# Patient Record
Sex: MALE | Race: OTHER | HISPANIC OR LATINO | URBAN - METROPOLITAN AREA
[De-identification: names, ages, dates, MRNs, and addresses within clinical notes are randomized per-mention and may not be internally consistent; named-entity substitution may affect disease eponyms.]

---

## 2018-03-30 ENCOUNTER — OUTPATIENT (OUTPATIENT)
Dept: OUTPATIENT SERVICES | Facility: HOSPITAL | Age: 83
LOS: 1 days | Discharge: HOME | End: 2018-03-30

## 2018-03-30 DIAGNOSIS — E03.9 HYPOTHYROIDISM, UNSPECIFIED: ICD-10-CM

## 2018-03-30 DIAGNOSIS — D64.9 ANEMIA, UNSPECIFIED: ICD-10-CM

## 2018-03-30 DIAGNOSIS — E13.9 OTHER SPECIFIED DIABETES MELLITUS WITHOUT COMPLICATIONS: ICD-10-CM

## 2018-03-30 DIAGNOSIS — N40.0 BENIGN PROSTATIC HYPERPLASIA WITHOUT LOWER URINARY TRACT SYMPTOMS: ICD-10-CM

## 2018-03-30 DIAGNOSIS — E78.1 PURE HYPERGLYCERIDEMIA: ICD-10-CM

## 2018-03-30 DIAGNOSIS — D51.8 OTHER VITAMIN B12 DEFICIENCY ANEMIAS: ICD-10-CM

## 2018-03-30 DIAGNOSIS — Z00.00 ENCOUNTER FOR GENERAL ADULT MEDICAL EXAMINATION WITHOUT ABNORMAL FINDINGS: ICD-10-CM

## 2018-03-30 DIAGNOSIS — R79.9 ABNORMAL FINDING OF BLOOD CHEMISTRY, UNSPECIFIED: ICD-10-CM

## 2018-03-30 DIAGNOSIS — E53.8 DEFICIENCY OF OTHER SPECIFIED B GROUP VITAMINS: ICD-10-CM

## 2018-03-30 DIAGNOSIS — N39.0 URINARY TRACT INFECTION, SITE NOT SPECIFIED: ICD-10-CM

## 2018-03-30 DIAGNOSIS — R53.83 OTHER FATIGUE: ICD-10-CM

## 2018-03-30 DIAGNOSIS — D52.9 FOLATE DEFICIENCY ANEMIA, UNSPECIFIED: ICD-10-CM

## 2018-03-30 DIAGNOSIS — E55.9 VITAMIN D DEFICIENCY, UNSPECIFIED: ICD-10-CM

## 2018-06-29 ENCOUNTER — OUTPATIENT (OUTPATIENT)
Dept: OUTPATIENT SERVICES | Facility: HOSPITAL | Age: 83
LOS: 1 days | Discharge: HOME | End: 2018-06-29

## 2018-06-29 DIAGNOSIS — Z00.00 ENCOUNTER FOR GENERAL ADULT MEDICAL EXAMINATION WITHOUT ABNORMAL FINDINGS: ICD-10-CM

## 2019-03-20 ENCOUNTER — OUTPATIENT (OUTPATIENT)
Dept: OUTPATIENT SERVICES | Facility: HOSPITAL | Age: 84
LOS: 1 days | Discharge: HOME | End: 2019-03-20

## 2019-03-20 DIAGNOSIS — Z00.00 ENCOUNTER FOR GENERAL ADULT MEDICAL EXAMINATION WITHOUT ABNORMAL FINDINGS: ICD-10-CM

## 2019-03-20 DIAGNOSIS — D51.9 VITAMIN B12 DEFICIENCY ANEMIA, UNSPECIFIED: ICD-10-CM

## 2019-03-20 DIAGNOSIS — D51.8 OTHER VITAMIN B12 DEFICIENCY ANEMIAS: ICD-10-CM

## 2019-03-20 DIAGNOSIS — E78.2 MIXED HYPERLIPIDEMIA: ICD-10-CM

## 2019-03-20 DIAGNOSIS — N40.0 BENIGN PROSTATIC HYPERPLASIA WITHOUT LOWER URINARY TRACT SYMPTOMS: ICD-10-CM

## 2019-03-20 DIAGNOSIS — E13.9 OTHER SPECIFIED DIABETES MELLITUS WITHOUT COMPLICATIONS: ICD-10-CM

## 2019-03-20 DIAGNOSIS — D52.9 FOLATE DEFICIENCY ANEMIA, UNSPECIFIED: ICD-10-CM

## 2019-03-20 DIAGNOSIS — E03.9 HYPOTHYROIDISM, UNSPECIFIED: ICD-10-CM

## 2019-03-20 DIAGNOSIS — E53.8 DEFICIENCY OF OTHER SPECIFIED B GROUP VITAMINS: ICD-10-CM

## 2019-03-20 DIAGNOSIS — E55.9 VITAMIN D DEFICIENCY, UNSPECIFIED: ICD-10-CM

## 2019-03-20 DIAGNOSIS — N39.0 URINARY TRACT INFECTION, SITE NOT SPECIFIED: ICD-10-CM

## 2019-03-20 DIAGNOSIS — D64.9 ANEMIA, UNSPECIFIED: ICD-10-CM

## 2019-03-20 DIAGNOSIS — R79.9 ABNORMAL FINDING OF BLOOD CHEMISTRY, UNSPECIFIED: ICD-10-CM

## 2019-03-20 DIAGNOSIS — R53.83 OTHER FATIGUE: ICD-10-CM

## 2019-06-18 ENCOUNTER — OUTPATIENT (OUTPATIENT)
Dept: OUTPATIENT SERVICES | Facility: HOSPITAL | Age: 84
LOS: 1 days | Discharge: HOME | End: 2019-06-18
Payer: MEDICARE

## 2019-06-18 PROCEDURE — 93923 UPR/LXTR ART STDY 3+ LVLS: CPT | Mod: 26

## 2019-06-24 DIAGNOSIS — I70.213 ATHEROSCLEROSIS OF NATIVE ARTERIES OF EXTREMITIES WITH INTERMITTENT CLAUDICATION, BILATERAL LEGS: ICD-10-CM

## 2019-06-27 PROBLEM — Z00.00 ENCOUNTER FOR PREVENTIVE HEALTH EXAMINATION: Status: ACTIVE | Noted: 2019-06-27

## 2019-07-19 ENCOUNTER — APPOINTMENT (OUTPATIENT)
Dept: VASCULAR SURGERY | Facility: CLINIC | Age: 84
End: 2019-07-19
Payer: MEDICARE

## 2019-07-19 ENCOUNTER — OUTPATIENT (OUTPATIENT)
Dept: OUTPATIENT SERVICES | Facility: HOSPITAL | Age: 84
LOS: 1 days | Discharge: HOME | End: 2019-07-19

## 2019-07-19 VITALS
HEIGHT: 66 IN | SYSTOLIC BLOOD PRESSURE: 148 MMHG | DIASTOLIC BLOOD PRESSURE: 80 MMHG | BODY MASS INDEX: 24.91 KG/M2 | WEIGHT: 155 LBS

## 2019-07-19 DIAGNOSIS — E78.00 PURE HYPERCHOLESTEROLEMIA, UNSPECIFIED: ICD-10-CM

## 2019-07-19 DIAGNOSIS — I70.213 ATHEROSCLEROSIS OF NATIVE ARTERIES OF EXTREMITIES WITH INTERMITTENT CLAUDICATION, BILATERAL LEGS: ICD-10-CM

## 2019-07-19 DIAGNOSIS — E11.9 TYPE 2 DIABETES MELLITUS W/OUT COMPLICATIONS: ICD-10-CM

## 2019-07-19 PROCEDURE — 99203 OFFICE O/P NEW LOW 30 MIN: CPT

## 2019-07-19 PROCEDURE — 93925 LOWER EXTREMITY STUDY: CPT

## 2019-07-19 NOTE — HISTORY OF PRESENT ILLNESS
[FreeTextEntry1] : 86 y/o gentleman with h/o diabetes presents with one month of left calf pain at 50 feet of ambulation, relieved with rest, no foot pain at rest.

## 2019-07-19 NOTE — CONSULT LETTER
[Dear  ___] : Dear  [unfilled], [Consult Letter:] : I had the pleasure of evaluating your patient, [unfilled]. [Please see my note below.] : Please see my note below. [FreeTextEntry2] : Dear Dr. Perry Davis,

## 2019-07-19 NOTE — DATA REVIEWED
[FreeTextEntry1] : I performed an arterial duplex which was medically necessary to evaluate for arterial disease. It showed patent right femoral and popliteal arteries and TPT with stenosis, left CFA with > 50% stenosis with diminished flow in the SFA and popliteal artery.\par

## 2019-07-19 NOTE — ASSESSMENT
[FreeTextEntry1] : 84 y/o gentleman with h/o diabetes presents with one month of left calf pain at 50 feet of ambulation, relieved with rest, no foot pain at rest.\par I performed an arterial duplex which was medically necessary to evaluate for arterial disease. It showed patent right femoral and popliteal arteries and TPT with stenosis, left CFA with > 50% stenosis with diminished flow in the SFA and popliteal artery.\par I would like to obtain a CTA angiogram of the aorta with bilateral lower extremities to further evaluate the arterial disease and develop a treatment plan. I am sending him for blood work to assess his renal function prior to IV contrast.

## 2019-07-25 LAB
BUN SERPL-MCNC: 27 MG/DL
CREAT SERPL-MCNC: 1.4 MG/DL

## 2019-09-03 ENCOUNTER — APPOINTMENT (OUTPATIENT)
Dept: VASCULAR SURGERY | Facility: CLINIC | Age: 84
End: 2019-09-03
Payer: MEDICARE

## 2019-09-03 PROCEDURE — 99213 OFFICE O/P EST LOW 20 MIN: CPT

## 2019-09-03 NOTE — ASSESSMENT
[FreeTextEntry1] : 84 y/o gentleman with h/o diabetes presents with one month of left calf pain at 50 feet of ambulation, relieved with rest, no foot pain at rest.\par I performed an arterial duplex which was medically necessary to evaluate for arterial disease. It showed patent right femoral and popliteal arteries and TPT with stenosis, left CFA with > 50% stenosis with diminished flow in the SFA and popliteal artery.\par  A CTA angiogram of the aorta with bilateral lower extremities showed bilateral iliac artery stenosis and common femoral disease. I have discussed performing an angiogram and iliac artery angioplasty as well as a left CFA endarterectomy. The risk and benefits of surgery discussed with the patient He would like to try conservative management at this time. I'd like to see him back in my office in 6 months time or If his symptoms worsen he will follow up sooner.

## 2019-09-03 NOTE — HISTORY OF PRESENT ILLNESS
[FreeTextEntry1] : 84 y/o gentleman with h/o diabetes presents with one month of left calf pain at 50 feet of ambulation, relieved with rest, no foot pain at rest.

## 2019-11-21 ENCOUNTER — OUTPATIENT (OUTPATIENT)
Dept: OUTPATIENT SERVICES | Facility: HOSPITAL | Age: 84
LOS: 1 days | Discharge: HOME | End: 2019-11-21

## 2019-11-21 DIAGNOSIS — N40.0 BENIGN PROSTATIC HYPERPLASIA WITHOUT LOWER URINARY TRACT SYMPTOMS: ICD-10-CM

## 2019-11-21 DIAGNOSIS — R79.9 ABNORMAL FINDING OF BLOOD CHEMISTRY, UNSPECIFIED: ICD-10-CM

## 2019-11-21 DIAGNOSIS — R53.83 OTHER FATIGUE: ICD-10-CM

## 2019-11-21 DIAGNOSIS — D52.9 FOLATE DEFICIENCY ANEMIA, UNSPECIFIED: ICD-10-CM

## 2019-11-21 DIAGNOSIS — N39.0 URINARY TRACT INFECTION, SITE NOT SPECIFIED: ICD-10-CM

## 2019-11-21 DIAGNOSIS — E13.9 OTHER SPECIFIED DIABETES MELLITUS WITHOUT COMPLICATIONS: ICD-10-CM

## 2019-11-21 DIAGNOSIS — D51.8 OTHER VITAMIN B12 DEFICIENCY ANEMIAS: ICD-10-CM

## 2019-11-21 DIAGNOSIS — E78.2 MIXED HYPERLIPIDEMIA: ICD-10-CM

## 2019-11-21 DIAGNOSIS — E55.9 VITAMIN D DEFICIENCY, UNSPECIFIED: ICD-10-CM

## 2019-11-21 DIAGNOSIS — E53.8 DEFICIENCY OF OTHER SPECIFIED B GROUP VITAMINS: ICD-10-CM

## 2019-11-21 DIAGNOSIS — D51.9 VITAMIN B12 DEFICIENCY ANEMIA, UNSPECIFIED: ICD-10-CM

## 2019-11-21 DIAGNOSIS — D64.9 ANEMIA, UNSPECIFIED: ICD-10-CM

## 2019-11-21 DIAGNOSIS — E03.9 HYPOTHYROIDISM, UNSPECIFIED: ICD-10-CM

## 2019-11-21 DIAGNOSIS — Z00.00 ENCOUNTER FOR GENERAL ADULT MEDICAL EXAMINATION WITHOUT ABNORMAL FINDINGS: ICD-10-CM

## 2019-12-20 ENCOUNTER — APPOINTMENT (OUTPATIENT)
Dept: VASCULAR SURGERY | Facility: CLINIC | Age: 84
End: 2019-12-20
Payer: MEDICARE

## 2019-12-20 DIAGNOSIS — I70.213 ATHEROSCLEROSIS OF NATIVE ARTERIES OF EXTREMITIES WITH INTERMITTENT CLAUDICATION, BILATERAL LEGS: ICD-10-CM

## 2019-12-20 PROCEDURE — 99213 OFFICE O/P EST LOW 20 MIN: CPT

## 2019-12-20 NOTE — ASSESSMENT
[FreeTextEntry1] : 87 y/o gentleman with h/o diabetes presents with left calf pain at 50 feet of ambulation, relieved with rest, no foot pain at rest. He was started on Pletal and states that his symptoms have improved, tolerating it without any side-effects from Pletal.\par  A CTA angiogram of the aorta with bilateral lower extremities showed bilateral iliac artery stenosis and left common femoral disease. He will require left CFA endarterectomy for treatment if he develops worsening symptoms.\par I'd like to see him back in my office in 6 months time or if his symptoms worsen he will follow up sooner.

## 2019-12-20 NOTE — HISTORY OF PRESENT ILLNESS
[FreeTextEntry1] : 87 y/o gentleman with h/o diabetes presents with left calf pain at 50 feet of ambulation, relieved with rest, no foot pain at rest. He was started on Pletal and states that his symptoms have improved, no side-effects from Pletal.

## 2019-12-20 NOTE — CONSULT LETTER
[Dear  ___] : Dear  [unfilled], [Please see my note below.] : Please see my note below. [Courtesy Letter:] : I had the pleasure of seeing your patient, [unfilled], in my office today. [FreeTextEntry2] : Dear Dr. Perry Davis,

## 2020-04-15 ENCOUNTER — APPOINTMENT (OUTPATIENT)
Dept: VASCULAR SURGERY | Facility: CLINIC | Age: 85
End: 2020-04-15
Payer: MEDICARE

## 2020-04-15 ENCOUNTER — INPATIENT (INPATIENT)
Facility: HOSPITAL | Age: 85
LOS: 6 days | Discharge: HOME | End: 2020-04-22
Attending: SURGERY | Admitting: SURGERY
Payer: MEDICARE

## 2020-04-15 VITALS
SYSTOLIC BLOOD PRESSURE: 162 MMHG | RESPIRATION RATE: 18 BRPM | TEMPERATURE: 97 F | OXYGEN SATURATION: 100 % | HEART RATE: 84 BPM | DIASTOLIC BLOOD PRESSURE: 70 MMHG

## 2020-04-15 LAB
ALBUMIN SERPL ELPH-MCNC: 4.3 G/DL — SIGNIFICANT CHANGE UP (ref 3.5–5.2)
ALP SERPL-CCNC: 101 U/L — SIGNIFICANT CHANGE UP (ref 30–115)
ALT FLD-CCNC: 14 U/L — SIGNIFICANT CHANGE UP (ref 0–41)
ANION GAP SERPL CALC-SCNC: 10 MMOL/L — SIGNIFICANT CHANGE UP (ref 7–14)
APTT BLD: 24.9 SEC — LOW (ref 27–39.2)
AST SERPL-CCNC: 20 U/L — SIGNIFICANT CHANGE UP (ref 0–41)
BASOPHILS # BLD AUTO: 0.02 K/UL — SIGNIFICANT CHANGE UP (ref 0–0.2)
BASOPHILS NFR BLD AUTO: 0.3 % — SIGNIFICANT CHANGE UP (ref 0–1)
BILIRUB SERPL-MCNC: 0.8 MG/DL — SIGNIFICANT CHANGE UP (ref 0.2–1.2)
BLD GP AB SCN SERPL QL: SIGNIFICANT CHANGE UP
BUN SERPL-MCNC: 31 MG/DL — HIGH (ref 10–20)
CALCIUM SERPL-MCNC: 9.8 MG/DL — SIGNIFICANT CHANGE UP (ref 8.5–10.1)
CHLORIDE SERPL-SCNC: 97 MMOL/L — LOW (ref 98–110)
CO2 SERPL-SCNC: 27 MMOL/L — SIGNIFICANT CHANGE UP (ref 17–32)
CREAT SERPL-MCNC: 1.4 MG/DL — SIGNIFICANT CHANGE UP (ref 0.7–1.5)
EOSINOPHIL # BLD AUTO: 0.08 K/UL — SIGNIFICANT CHANGE UP (ref 0–0.7)
EOSINOPHIL NFR BLD AUTO: 1.2 % — SIGNIFICANT CHANGE UP (ref 0–8)
GLUCOSE BLDC GLUCOMTR-MCNC: 141 MG/DL — HIGH (ref 70–99)
GLUCOSE BLDC GLUCOMTR-MCNC: 148 MG/DL — HIGH (ref 70–99)
GLUCOSE BLDC GLUCOMTR-MCNC: 228 MG/DL — HIGH (ref 70–99)
GLUCOSE SERPL-MCNC: 198 MG/DL — HIGH (ref 70–99)
HCT VFR BLD CALC: 33.5 % — LOW (ref 42–52)
HGB BLD-MCNC: 11.2 G/DL — LOW (ref 14–18)
IMM GRANULOCYTES NFR BLD AUTO: 0.1 % — SIGNIFICANT CHANGE UP (ref 0.1–0.3)
INR BLD: 1.02 RATIO — SIGNIFICANT CHANGE UP (ref 0.65–1.3)
LACTATE SERPL-SCNC: 2 MMOL/L — SIGNIFICANT CHANGE UP (ref 0.7–2)
LYMPHOCYTES # BLD AUTO: 2.72 K/UL — SIGNIFICANT CHANGE UP (ref 1.2–3.4)
LYMPHOCYTES # BLD AUTO: 39.9 % — SIGNIFICANT CHANGE UP (ref 20.5–51.1)
MCHC RBC-ENTMCNC: 30.6 PG — SIGNIFICANT CHANGE UP (ref 27–31)
MCHC RBC-ENTMCNC: 33.4 G/DL — SIGNIFICANT CHANGE UP (ref 32–37)
MCV RBC AUTO: 91.5 FL — SIGNIFICANT CHANGE UP (ref 80–94)
MONOCYTES # BLD AUTO: 0.61 K/UL — HIGH (ref 0.1–0.6)
MONOCYTES NFR BLD AUTO: 8.9 % — SIGNIFICANT CHANGE UP (ref 1.7–9.3)
NEUTROPHILS # BLD AUTO: 3.38 K/UL — SIGNIFICANT CHANGE UP (ref 1.4–6.5)
NEUTROPHILS NFR BLD AUTO: 49.6 % — SIGNIFICANT CHANGE UP (ref 42.2–75.2)
NRBC # BLD: 0 /100 WBCS — SIGNIFICANT CHANGE UP (ref 0–0)
PLATELET # BLD AUTO: 202 K/UL — SIGNIFICANT CHANGE UP (ref 130–400)
POTASSIUM SERPL-MCNC: 4.7 MMOL/L — SIGNIFICANT CHANGE UP (ref 3.5–5)
POTASSIUM SERPL-MCNC: 5.1 MMOL/L — HIGH (ref 3.5–5)
POTASSIUM SERPL-SCNC: 4.7 MMOL/L — SIGNIFICANT CHANGE UP (ref 3.5–5)
POTASSIUM SERPL-SCNC: 5.1 MMOL/L — HIGH (ref 3.5–5)
PROT SERPL-MCNC: 6.8 G/DL — SIGNIFICANT CHANGE UP (ref 6–8)
PROTHROM AB SERPL-ACNC: 11.7 SEC — SIGNIFICANT CHANGE UP (ref 9.95–12.87)
RBC # BLD: 3.66 M/UL — LOW (ref 4.7–6.1)
RBC # FLD: 12.7 % — SIGNIFICANT CHANGE UP (ref 11.5–14.5)
SODIUM SERPL-SCNC: 134 MMOL/L — LOW (ref 135–146)
WBC # BLD: 6.82 K/UL — SIGNIFICANT CHANGE UP (ref 4.8–10.8)
WBC # FLD AUTO: 6.82 K/UL — SIGNIFICANT CHANGE UP (ref 4.8–10.8)

## 2020-04-15 PROCEDURE — 99215 OFFICE O/P EST HI 40 MIN: CPT

## 2020-04-15 PROCEDURE — 73630 X-RAY EXAM OF FOOT: CPT | Mod: 26,LT

## 2020-04-15 PROCEDURE — 93010 ELECTROCARDIOGRAM REPORT: CPT

## 2020-04-15 PROCEDURE — 93925 LOWER EXTREMITY STUDY: CPT | Mod: 26

## 2020-04-15 PROCEDURE — 99285 EMERGENCY DEPT VISIT HI MDM: CPT

## 2020-04-15 RX ORDER — MORPHINE SULFATE 50 MG/1
2 CAPSULE, EXTENDED RELEASE ORAL EVERY 4 HOURS
Refills: 0 | Status: DISCONTINUED | OUTPATIENT
Start: 2020-04-15 | End: 2020-04-17

## 2020-04-15 RX ORDER — GLUCAGON INJECTION, SOLUTION 0.5 MG/.1ML
1 INJECTION, SOLUTION SUBCUTANEOUS ONCE
Refills: 0 | Status: DISCONTINUED | OUTPATIENT
Start: 2020-04-15 | End: 2020-04-17

## 2020-04-15 RX ORDER — SIMVASTATIN 20 MG/1
40 TABLET, FILM COATED ORAL DAILY
Refills: 0 | Status: DISCONTINUED | OUTPATIENT
Start: 2020-04-15 | End: 2020-04-17

## 2020-04-15 RX ORDER — METOPROLOL TARTRATE 50 MG
5 TABLET ORAL ONCE
Refills: 0 | Status: COMPLETED | OUTPATIENT
Start: 2020-04-15 | End: 2020-04-15

## 2020-04-15 RX ORDER — CILOSTAZOL 100 MG/1
100 TABLET ORAL
Refills: 0 | Status: DISCONTINUED | OUTPATIENT
Start: 2020-04-15 | End: 2020-04-17

## 2020-04-15 RX ORDER — DEXTROSE 50 % IN WATER 50 %
15 SYRINGE (ML) INTRAVENOUS ONCE
Refills: 0 | Status: DISCONTINUED | OUTPATIENT
Start: 2020-04-15 | End: 2020-04-17

## 2020-04-15 RX ORDER — DEXTROSE 50 % IN WATER 50 %
25 SYRINGE (ML) INTRAVENOUS ONCE
Refills: 0 | Status: DISCONTINUED | OUTPATIENT
Start: 2020-04-15 | End: 2020-04-17

## 2020-04-15 RX ORDER — ASPIRIN/CALCIUM CARB/MAGNESIUM 324 MG
81 TABLET ORAL DAILY
Refills: 0 | Status: DISCONTINUED | OUTPATIENT
Start: 2020-04-15 | End: 2020-04-17

## 2020-04-15 RX ORDER — LISINOPRIL 2.5 MG/1
20 TABLET ORAL DAILY
Refills: 0 | Status: DISCONTINUED | OUTPATIENT
Start: 2020-04-15 | End: 2020-04-17

## 2020-04-15 RX ORDER — SODIUM CHLORIDE 9 MG/ML
1000 INJECTION, SOLUTION INTRAVENOUS
Refills: 0 | Status: DISCONTINUED | OUTPATIENT
Start: 2020-04-15 | End: 2020-04-17

## 2020-04-15 RX ORDER — MORPHINE SULFATE 50 MG/1
4 CAPSULE, EXTENDED RELEASE ORAL ONCE
Refills: 0 | Status: DISCONTINUED | OUTPATIENT
Start: 2020-04-15 | End: 2020-04-15

## 2020-04-15 RX ORDER — METOPROLOL TARTRATE 50 MG
25 TABLET ORAL
Refills: 0 | Status: DISCONTINUED | OUTPATIENT
Start: 2020-04-15 | End: 2020-04-17

## 2020-04-15 RX ORDER — METRONIDAZOLE 500 MG
500 TABLET ORAL ONCE
Refills: 0 | Status: DISCONTINUED | OUTPATIENT
Start: 2020-04-15 | End: 2020-04-15

## 2020-04-15 RX ORDER — INSULIN LISPRO 100/ML
VIAL (ML) SUBCUTANEOUS
Refills: 0 | Status: DISCONTINUED | OUTPATIENT
Start: 2020-04-15 | End: 2020-04-16

## 2020-04-15 RX ORDER — SODIUM CHLORIDE 9 MG/ML
1000 INJECTION INTRAMUSCULAR; INTRAVENOUS; SUBCUTANEOUS
Refills: 0 | Status: DISCONTINUED | OUTPATIENT
Start: 2020-04-15 | End: 2020-04-16

## 2020-04-15 RX ORDER — HEPARIN SODIUM 5000 [USP'U]/ML
INJECTION INTRAVENOUS; SUBCUTANEOUS
Qty: 25000 | Refills: 0 | Status: DISCONTINUED | OUTPATIENT
Start: 2020-04-15 | End: 2020-04-16

## 2020-04-15 RX ORDER — TAMSULOSIN HYDROCHLORIDE 0.4 MG/1
0.4 CAPSULE ORAL AT BEDTIME
Refills: 0 | Status: DISCONTINUED | OUTPATIENT
Start: 2020-04-15 | End: 2020-04-17

## 2020-04-15 RX ORDER — DEXTROSE 50 % IN WATER 50 %
12.5 SYRINGE (ML) INTRAVENOUS ONCE
Refills: 0 | Status: DISCONTINUED | OUTPATIENT
Start: 2020-04-15 | End: 2020-04-17

## 2020-04-15 RX ORDER — CEFTRIAXONE 500 MG/1
2000 INJECTION, POWDER, FOR SOLUTION INTRAMUSCULAR; INTRAVENOUS ONCE
Refills: 0 | Status: DISCONTINUED | OUTPATIENT
Start: 2020-04-15 | End: 2020-04-15

## 2020-04-15 RX ADMIN — Medication 25 MILLIGRAM(S): at 21:10

## 2020-04-15 RX ADMIN — MORPHINE SULFATE 4 MILLIGRAM(S): 50 CAPSULE, EXTENDED RELEASE ORAL at 21:10

## 2020-04-15 RX ADMIN — HEPARIN SODIUM 1300 UNIT(S)/HR: 5000 INJECTION INTRAVENOUS; SUBCUTANEOUS at 19:45

## 2020-04-15 RX ADMIN — TAMSULOSIN HYDROCHLORIDE 0.4 MILLIGRAM(S): 0.4 CAPSULE ORAL at 21:10

## 2020-04-15 RX ADMIN — Medication 5 MILLIGRAM(S): at 23:17

## 2020-04-15 RX ADMIN — CILOSTAZOL 100 MILLIGRAM(S): 100 TABLET ORAL at 21:10

## 2020-04-15 NOTE — ED PROVIDER NOTE - ATTENDING CONTRIBUTION TO CARE
85 yo male PMH kidney stones, PVD, DM, HTN, HLD, CAD, hypothyroidism, anemia, BPH sent by Dr Florence for admission and angio tomorrow for evaluation of left leg ischemia.  Patient c/o left foot/toes swelling and redness for a few weeks, denies any pain or any other acute complaints.  Well-appearing elderly male, NAD, PERRL, nml work of breathing, lungs CTA b/l, RRR, abdomen soft, NT/ND, no midline spine or CVA ttp, no leg edema or calf ttp, + lt distal foot/toes erythema and ttp, unable to palpate distal pulses.  Will get labs and admit to vascular surgery for further management.

## 2020-04-15 NOTE — ED PROVIDER NOTE - PHYSICAL EXAMINATION
Gen: NAD, AOx3  Head: NCAT  HEENT: PERRL, oral mucosa moist, normal conjunctiva, oropharynx clear without exudate or erythema  Lung: CTAB, no respiratory distress, no wheezing, rales, rhonchi  CV: normal s1/s2, rrr, Normal perfusion, pulses 2+ throughout  Abd: soft, NTND, no CVA tenderness  Genitourinary: no pelvic tenderness  MSK: No edema, no visible deformities, full range of motion in all 4 extremities  Neuro: No focal neurologic deficits  Skin: LLE: edema/erythema to all digit extending to dorsal foot w/ no palpable pedal pulse   Psych: normal affect Gen: NAD, AOx3  Head: NCAT  HEENT: PERRL, oral mucosa moist, normal conjunctiva, oropharynx clear without exudate or erythema  Lung: CTAB, no respiratory distress, no wheezing, rales, rhonchi  CV: normal s1/s2, rrr, Normal perfusion, pulses 2+ throughout  Abd: soft, NTND, no CVA tenderness  Genitourinary: no pelvic tenderness  MSK: No edema, no visible deformities, full range of motion in all 4 extremities  Neuro: No focal neurologic deficits  Skin: LLE: edema/erythema to all digits extending to dorsal foot w/ no palpable pedal pulse   Psych: normal affect

## 2020-04-15 NOTE — CONSULT NOTE ADULT - ASSESSMENT
Assessment:  Pre-op assessment for peripheral endarterectomy   CAD s/p CABGx4 2005, no cardiac event since then  METS>4  No chest symptoms  New LBBB compared to prior EKG    Plan:  c/w aspirin, beta-blocker, statin  moderate risk patient for planned procedure

## 2020-04-15 NOTE — H&P ADULT - ASSESSMENT
86M w/PMHx of DM, HTN, HLD, CAD, hypothyroidism, anemia, BPH, nephrolithiasis admitted for LLE angiogram on 4/16 for evaluation of LLE arterial disease.       Plan: 86M w/PMHx of DM, HTN, HLD, CAD, hypothyroidism, anemia, BPH, nephrolithiasis admitted for LLE angiogram on 4/16 for evaluation of LLE arterial disease.       Plan:   -Admission to Vascular Surgery service   -LLE angiogram 4/16   -Heparin gtt  -q6 hr PTT, adjust heparin gtt as needed based on nomogram   -NPO at midnight   -IVF while NPO   -Continue all home medications   -Consent

## 2020-04-15 NOTE — H&P ADULT - ATTENDING COMMENTS
86 year old with rest pain     plan for angiogram and possible intervention  cards and med clearance

## 2020-04-15 NOTE — CONSULT NOTE ADULT - ATTENDING COMMENTS
Patient seen and examined. D/w cardiology fellow.  Agree with findings as documented by the fellow in the above note.  No acute cardiac contraindications to the planned endovascular procedure.  Intermediate risk on the basis of patient history and age.  ET over 4 MET's  2D echo noted.    Recommend BP control.  If BP is still elevated, add a calcium blocker (amlodipine).  C/w ASA, statin.  Consider changing metformin to an alternative agent, given renal function.  Hold pre-procedure due to exposure to contrast dye.  F/u with vascular surgery.

## 2020-04-15 NOTE — ASSESSMENT
[FreeTextEntry1] : 86 year old male with known PVD and 1/2 block claudication, now with 2 week history of progression of rest pain involving left foot and GT. Foot ruberous, and chronic ischemic changes.  Offered admission with workup and LE revascularization.  He and his daughter fully understand all the attendant risks and benefits especially given the present COVID environment and they wish to proceed

## 2020-04-15 NOTE — PRE-ANESTHESIA EVALUATION ADULT - NSANTHPMHFT_GEN_ALL_CORE
86M w/PMHx of DM, HTN, HLD, CAD, hypothyroidism, anemia, BPH, nephrolithiasis admitted for LLE angiogram on 4/16 for evaluation of LLE arterial disease.       Plan:   -Admission to Vascular Surgery service   -LLE angiogram 4/16   -Heparin gtt  -q6 hr PTT, adjust heparin gtt as needed based on nomogram   -NPO at midnight   -IVF while NPO   -Continue all home medications   -Consent

## 2020-04-15 NOTE — H&P ADULT - HISTORY OF PRESENT ILLNESS
86M w/PMHx of DM, HTN, HLD, CAD, hypothyroidism, anemia, BPH, nephrolithiasis who was sent in from Dr. Herring's outpatient office for left leg pain at rest with known iliac/CFA stenosis found on prior CTA (no treatment/intervention). He was sent in for LLE angiogram on 4/16 for evaluation of LLE arterial disease.

## 2020-04-15 NOTE — ED PROVIDER NOTE - CLINICAL SUMMARY MEDICAL DECISION MAKING FREE TEXT BOX
87 yo male being admitted for angio of left lower extremity, Vascular service aware, requested arterial duplex which they will follow.

## 2020-04-15 NOTE — ED PROVIDER NOTE - OBJECTIVE STATEMENT
87 yo male with a pmh of DM, HTN, and HLD presents c/o LLE pain/edema/erythema for two weeks. pt was started on cefuroxime 1 week prior by pcp but sent to ED today due to worsening of edema/erythema. pt states he was seen by his vascular surgeon before arrival and told to come into hospital for admission for ischemic rest pain. denies any other symptoms including fevers, chill, headache, recent illness/travel, cough, abdominal pain, chest pain, or SOB.

## 2020-04-15 NOTE — H&P ADULT - NSHPLABSRESULTS_GEN_ALL_CORE
Labs:  CAPILLARY BLOOD GLUCOSE               11.2   6.82  )-----------( 202      ( 15 Apr 2020 14:28 )             33.5       Auto Neutrophil %: 49.6 % (04-15-20 @ 14:28)  Auto Immature Granulocyte %: 0.1 % (04-15-20 @ 14:28)    04-15    134<L>  |  97<L>  |  31<H>  ----------------------------<  198<H>  5.1<H>   |  27  |  1.4    Calcium, Total Serum: 9.8 mg/dL (04-15-20 @ 14:28)    LFTs:             6.8  | 0.8  | 20       ------------------[101     ( 15 Apr 2020 14:28 )  4.3  | x    | 14          Lactate, Blood: 2.0 mmol/L (04-15-20 @ 14:28)    Coags:     11.70  ----< 1.02    ( 15 Apr 2020 14:25 )     24.9

## 2020-04-15 NOTE — CONSULT NOTE ADULT - SUBJECTIVE AND OBJECTIVE BOX
Date of Admission: 04-15-20    CHIEF COMPLAINT: Patient is a 86y old  Male who presents   with a chief complaint of LLE Angiogram (15 Apr 2020   15:25)      HPI:  86M w/PMHx of DM, HTN, HLD, CAD s/p CABG 2004,   hypothyroidism, anemia, BPH, nephrolithiasis who was sent   in from Dr. Herring's outpatient office for left leg pain   at rest with known iliac/CFA stenosis found on prior CTA   (no treatment/intervention). He was sent in for LLE   angiogram on 4/16 for evaluation of LLE arterial disease.   (15 Apr 2020 15:25)      PAST MEDICAL & SURGICAL HISTORY:  DM  HTN  CAD  Hypothyroidism  Anemia  BPH  Nephrolithiasis  CABG 2004  Cystoscopy and right ureteral stent 2016    FAMILY HISTORY:  [x] CAD sister  [x] MI father age 68    SOCIAL HISTORY:    [x] Non-smoker  [x] No alcohol use  [x] No illicit drug use    Allergies: No Known Allergies    REVIEW OF SYSTEMS:  CONSTITUTIONAL: No fever, weight loss, or fatigue  CARDIOLOGY: No chest pain, dyspnea of exertion, or syncopal episodes.   RESPIRATORY: No shortness of breath, cough, wheezing.   NEUROLOGICAL: No weakness, no focal deficits to report.  GI: No BRBPR, no N,V,diarrhea.    PSYCHIATRY: Normal mood and affect.  HEENT: No nasal discharge, no ecchymosis  SKIN: No ecchymosis, no breakdown  MUSCULOSKELETAL: Full range of motion x4. (+) Left LE pain.  EXTREM: Left big toe and foot swelling and redness.    PHYSICAL EXAM:  T(C): 36.6 (04-15-20 @ 18:02), Max: 36.6 (04-15-20 @     18:02)  HR: 87 (04-15-20 @ 18:02) (79 - 87)  BP: 177/81 (04-15-20 @ 18:02) (162/70 - 177/81)  RR: 19 (04-15-20 @ 18:02) (18 - 19)  SpO2: 100% (04-15-20 @ 15:00) (100% - 100%)  Wt(kg): --  I&O's Summary      General Appearance: NAD, normal for age and gender. 	  Neck: Normal JVP, no bruit.   Eyes: No xanthomalasia, Extra Ocular muscles intact.   Cardiovascular: Regular rate and rhythm S1 S2, No JVD, No   murmurs.  Respiratory: Lungs clear to auscultation. No wheezes,   rales or rhonchi.  Psychiatry: Alert and oriented x 3, Mood & affect   appropriate  Gastrointestinal:  Soft, Non-tender  Skin/Integumen: No other rashes, No ecchymoses, No cyanosis	  Neurologic: Non-focal deficits.  Musculoskeletal/ extremities: Normal range of motion, No     clubbing, cyanosis. Left toes and foot swelling, erythema and tenderness. No warmth.  Vascular: Diminished right tibial pulses. Non-palpable tibial pulses on left foot.    LABS:	 	                        11.2   6.82  )-----------( 202      ( 15 Apr 2020 14:28 )             33.5     04-15    134<L>  |  97<L>  |  31<H>  ----------------------------<  198<H>  5.1<H>   |  27  |  1.4    Ca    9.8      15 Apr 2020 14:28    TPro  6.8  /  Alb  4.3  /  TBili  0.8  /  DBili  x   /      AST  20  /  ALT  14  /  AlkPhos  101  04-15    PT/INR - ( 15 Apr 2020 14:25 )   PT: 11.70 sec;   INR:     1.02 ratio    PTT - ( 15 Apr 2020 14:25 )  PTT:24.9 sec    TELEMETRY EVENTS: 	    ECG: < from: 12 Lead ECG (04.15.20 @ 17:34) >  Diagnosis Line Sinus rhythm [id number] degree A-V block  Left bundle branch block  Abnormal ECG    RADIOLOGY:   OTHER: 	    PREVIOUS DIAGNOSTIC TESTING:    [x] Echocardiogram: 4/18/2012 EF 65%, no regional wall     abnormalities, trivial MVR, PVR, TVR    [x] Catheterization: 10/1/2004 3VD  ostial LM 70%, distal LM 85%  prox LAD 99%, D1 80% prox third  Left Circ short, normal  prox %, distal RCA filled by collaterals from left     coronaries    CABGx4 vessels 10/4/2004  SVG to D1, IntMed, OM1, PDA  LIMA to LAD   	  	  Home Medications:  aspirin 81 mg po q24h  benazepril 20 mg tablet:  (15 Apr 2020 15:35)  cilostazol 100 mg tablet:  (15 Apr 2020 15:35)  ezetimibe 10 mg tablet:  (15 Apr 2020 15:35)  Januvia 100 mg tablet:  (15 Apr 2020 15:35)  metformin 1,000 mg tablet:  (15 Apr 2020 15:35)  metoprolol tartrate 25 mg tablet po BID:  (15 Apr 2020 15:35)  simvastatin 40 mg tablet:  (15 Apr 2020 15:35)  tamsulosin 0.4 mg capsule:  (15 Apr 2020 15:35)    MEDICATIONS  (STANDING):  aspirin  chewable 81 milliGRAM(s) Oral daily  cilostazol 100 milliGRAM(s) Oral two times a day  dextrose 5%. 1000 milliLiter(s) (50 mL/Hr) IV Continuous     <Continuous>  dextrose 50% Injectable 12.5 Gram(s) IV Push once  dextrose 50% Injectable 25 Gram(s) IV Push once  dextrose 50% Injectable 25 Gram(s) IV Push once  heparin  Infusion.  Unit(s)/Hr (13 mL/Hr) IV Continuous     <Continuous>  insulin lispro (HumaLOG) corrective regimen sliding scale       SubCutaneous three times a day before meals  lisinopril 20 milliGRAM(s) Oral daily  metoprolol tartrate Oral Tab/Cap - Peds 25 milliGRAM(s)     Oral two times a day  simvastatin Oral Tab/Cap - Peds 40 milliGRAM(s) Oral     daily  sodium chloride 0.9%. 1000 milliLiter(s) (50 mL/Hr) IV     Continuous <Continuous>  tamsulosin 0.4 milliGRAM(s) Oral at bedtime    MEDICATIONS  (PRN):  dextrose 40% Gel 15 Gram(s) Oral once PRN Blood Glucose     LESS THAN 70 milliGRAM(s)/deciliter  glucagon  Injectable 1 milliGRAM(s) IntraMuscular once     PRN Glucose LESS THAN 70 milligrams/deciliter Date of Admission: 04-15-20    CHIEF COMPLAINT: Patient is a 86y old  Male who presents   with a chief complaint of LLE Angiogram (15 Apr 2020   15:25)      HPI:  86M w/PMHx of DM, HTN, HLD, CAD s/p CABG 2004,   hypothyroidism, anemia, BPH, nephrolithiasis who was sent   in from Dr. Herring's outpatient office for left leg pain   at rest with known iliac/CFA stenosis found on prior CTA   (no treatment/intervention). He was sent in for LLE   angiogram on 4/16 for evaluation of LLE arterial disease.   (15 Apr 2020 15:25)      PAST MEDICAL & SURGICAL HISTORY:  DM  HTN  CAD  Hypothyroidism  Anemia  BPH  Nephrolithiasis  CABG 2004  Cystoscopy and right ureteral stent 2016    FAMILY HISTORY:  [x] CAD sister  [x] MI father age 68    SOCIAL HISTORY:    [x] Non-smoker  [x] No alcohol use  [x] No illicit drug use    Allergies: No Known Allergies    REVIEW OF SYSTEMS:  CONSTITUTIONAL: No fever, weight loss, or fatigue  CARDIOLOGY: No chest pain, dyspnea of exertion, or syncopal episodes.   RESPIRATORY: No shortness of breath, cough, wheezing.   NEUROLOGICAL: No weakness, no focal deficits to report.  GI: No BRBPR, no N,V,diarrhea.    PSYCHIATRY: Normal mood and affect.  HEENT: No nasal discharge, no ecchymosis  SKIN: No ecchymosis, no breakdown  MUSCULOSKELETAL: Full range of motion x4. (+) Left LE pain.  EXTREM: Left big toe and foot swelling and redness.    PHYSICAL EXAM:  T(C): 36.6 (04-15-20 @ 18:02), Max: 36.6 (04-15-20 @     18:02)  HR: 87 (04-15-20 @ 18:02) (79 - 87)  BP: 177/81 (04-15-20 @ 18:02) (162/70 - 177/81)  RR: 19 (04-15-20 @ 18:02) (18 - 19)  SpO2: 100% (04-15-20 @ 15:00) (100% - 100%)  Wt(kg): --  I&O's Summary      General Appearance: NAD, normal for age and gender. 	  Neck: Normal JVP, no bruit.   Eyes: No xanthomalasia, Extra Ocular muscles intact.   Cardiovascular: Regular rate and rhythm S1 S2, No JVD, 2/6 systolic   murmur.  Respiratory: Lungs clear to auscultation. No wheezes,   rales or rhonchi.  Psychiatry: Alert and oriented x 3, Mood & affect   appropriate  Gastrointestinal:  Soft, Non-tender  Skin/Integumen: No other rashes, No ecchymoses, No cyanosis	  Neurologic: Non-focal deficits.  Musculoskeletal/ extremities: Normal range of motion, No     clubbing, cyanosis. Left toes and foot swelling, erythema and tenderness. No warmth.  Vascular: Diminished right tibial pulses. Non-palpable tibial pulses on left foot.    LABS:	 	                        11.2   6.82  )-----------( 202      ( 15 Apr 2020 14:28 )             33.5     04-15    134<L>  |  97<L>  |  31<H>  ----------------------------<  198<H>  5.1<H>   |  27  |  1.4    Ca    9.8      15 Apr 2020 14:28    TPro  6.8  /  Alb  4.3  /  TBili  0.8  /  DBili  x   /      AST  20  /  ALT  14  /  AlkPhos  101  04-15    PT/INR - ( 15 Apr 2020 14:25 )   PT: 11.70 sec;   INR:     1.02 ratio    PTT - ( 15 Apr 2020 14:25 )  PTT:24.9 sec    TELEMETRY EVENTS: 	    ECG: < from: 12 Lead ECG (04.15.20 @ 17:34) >  Diagnosis Line Sinus rhythm [id number] degree A-V block  Left bundle branch block  Abnormal ECG    RADIOLOGY:   OTHER: 	    PREVIOUS DIAGNOSTIC TESTING:    [x] Echocardiogram: 4/18/2012 EF 65%, no regional wall     abnormalities, trivial MVR, PVR, TVR    [x] Catheterization: 10/1/2004 3VD  ostial LM 70%, distal LM 85%  prox LAD 99%, D1 80% prox third  Left Circ short, normal  prox %, distal RCA filled by collaterals from left     coronaries    CABGx4 vessels 10/4/2004  SVG to D1, IntMed, OM1, PDA  LIMA to LAD   	  	  Home Medications:  aspirin 81 mg po q24h  benazepril 20 mg tablet:  (15 Apr 2020 15:35)  cilostazol 100 mg tablet:  (15 Apr 2020 15:35)  ezetimibe 10 mg tablet:  (15 Apr 2020 15:35)  Januvia 100 mg tablet:  (15 Apr 2020 15:35)  metformin 1,000 mg tablet:  (15 Apr 2020 15:35)  metoprolol tartrate 25 mg tablet po BID:  (15 Apr 2020 15:35)  simvastatin 40 mg tablet:  (15 Apr 2020 15:35)  tamsulosin 0.4 mg capsule:  (15 Apr 2020 15:35)    MEDICATIONS  (STANDING):  aspirin  chewable 81 milliGRAM(s) Oral daily  cilostazol 100 milliGRAM(s) Oral two times a day  dextrose 5%. 1000 milliLiter(s) (50 mL/Hr) IV Continuous     <Continuous>  dextrose 50% Injectable 12.5 Gram(s) IV Push once  dextrose 50% Injectable 25 Gram(s) IV Push once  dextrose 50% Injectable 25 Gram(s) IV Push once  heparin  Infusion.  Unit(s)/Hr (13 mL/Hr) IV Continuous     <Continuous>  insulin lispro (HumaLOG) corrective regimen sliding scale       SubCutaneous three times a day before meals  lisinopril 20 milliGRAM(s) Oral daily  metoprolol tartrate Oral Tab/Cap - Peds 25 milliGRAM(s)     Oral two times a day  simvastatin Oral Tab/Cap - Peds 40 milliGRAM(s) Oral     daily  sodium chloride 0.9%. 1000 milliLiter(s) (50 mL/Hr) IV     Continuous <Continuous>  tamsulosin 0.4 milliGRAM(s) Oral at bedtime    MEDICATIONS  (PRN):  dextrose 40% Gel 15 Gram(s) Oral once PRN Blood Glucose     LESS THAN 70 milliGRAM(s)/deciliter  glucagon  Injectable 1 milliGRAM(s) IntraMuscular once     PRN Glucose LESS THAN 70 milligrams/deciliter

## 2020-04-15 NOTE — ED PROVIDER NOTE - NS ED ROS FT
Constitutional: (-) fever  Eyes/ENT: (-) visual changes   Cardiovascular: (-) chest pain, (-) syncope  Respiratory: (-) cough, (-) shortness of breath  Gastrointestinal: (-) vomiting, (-) diarrhea  Genitourinary: (-) dysuria, (-) hesitancy, (-) frequency   Musculoskeletal: (-) neck pain, (-) back pain, LLE pain  Integumentary: (-) rash, (-) edema  Neurological: (-) headache, (-) altered mental status  Allergic/Immunologic: (-) pruritus Constitutional: (-) fever  Eyes/ENT: (-) visual changes   Cardiovascular: (-) chest pain, (-) syncope  Respiratory: (-) cough, (-) shortness of breath  Gastrointestinal: (-) vomiting, (-) diarrhea  Genitourinary: (-) dysuria, (-) hesitancy, (-) frequency   Musculoskeletal: (-) neck pain, (-) back pain, LLE pain  Integumentary: (-) rash, (+) edema/erythema of LLE  Neurological: (-) headache, (-) altered mental status  Allergic/Immunologic: (-) pruritus

## 2020-04-16 LAB
A1C WITH ESTIMATED AVERAGE GLUCOSE RESULT: 8.4 % — HIGH (ref 4–5.6)
ANION GAP SERPL CALC-SCNC: 10 MMOL/L — SIGNIFICANT CHANGE UP (ref 7–14)
APTT BLD: 106.3 SEC — CRITICAL HIGH (ref 27–39.2)
APTT BLD: 124.9 SEC — CRITICAL HIGH (ref 27–39.2)
APTT BLD: 56 SEC — HIGH (ref 27–39.2)
APTT BLD: 68.7 SEC — HIGH (ref 27–39.2)
BUN SERPL-MCNC: 31 MG/DL — HIGH (ref 10–20)
CALCIUM SERPL-MCNC: 9.4 MG/DL — SIGNIFICANT CHANGE UP (ref 8.5–10.1)
CHLORIDE SERPL-SCNC: 104 MMOL/L — SIGNIFICANT CHANGE UP (ref 98–110)
CO2 SERPL-SCNC: 25 MMOL/L — SIGNIFICANT CHANGE UP (ref 17–32)
CREAT SERPL-MCNC: 1.5 MG/DL — SIGNIFICANT CHANGE UP (ref 0.7–1.5)
ESTIMATED AVERAGE GLUCOSE: 194 MG/DL — HIGH (ref 68–114)
GLUCOSE BLDC GLUCOMTR-MCNC: 207 MG/DL — HIGH (ref 70–99)
GLUCOSE BLDC GLUCOMTR-MCNC: 224 MG/DL — HIGH (ref 70–99)
GLUCOSE BLDC GLUCOMTR-MCNC: 251 MG/DL — HIGH (ref 70–99)
GLUCOSE SERPL-MCNC: 233 MG/DL — HIGH (ref 70–99)
HCT VFR BLD CALC: 32.2 % — LOW (ref 42–52)
HGB BLD-MCNC: 10.8 G/DL — LOW (ref 14–18)
INR BLD: 1.06 RATIO — SIGNIFICANT CHANGE UP (ref 0.65–1.3)
MAGNESIUM SERPL-MCNC: 2 MG/DL — SIGNIFICANT CHANGE UP (ref 1.8–2.4)
MCHC RBC-ENTMCNC: 30.3 PG — SIGNIFICANT CHANGE UP (ref 27–31)
MCHC RBC-ENTMCNC: 33.5 G/DL — SIGNIFICANT CHANGE UP (ref 32–37)
MCV RBC AUTO: 90.4 FL — SIGNIFICANT CHANGE UP (ref 80–94)
NRBC # BLD: 0 /100 WBCS — SIGNIFICANT CHANGE UP (ref 0–0)
PLATELET # BLD AUTO: 196 K/UL — SIGNIFICANT CHANGE UP (ref 130–400)
POTASSIUM SERPL-MCNC: 4.2 MMOL/L — SIGNIFICANT CHANGE UP (ref 3.5–5)
POTASSIUM SERPL-SCNC: 4.2 MMOL/L — SIGNIFICANT CHANGE UP (ref 3.5–5)
PROTHROM AB SERPL-ACNC: 12.2 SEC — SIGNIFICANT CHANGE UP (ref 9.95–12.87)
RBC # BLD: 3.56 M/UL — LOW (ref 4.7–6.1)
RBC # FLD: 12.7 % — SIGNIFICANT CHANGE UP (ref 11.5–14.5)
SODIUM SERPL-SCNC: 139 MMOL/L — SIGNIFICANT CHANGE UP (ref 135–146)
WBC # BLD: 8.43 K/UL — SIGNIFICANT CHANGE UP (ref 4.8–10.8)
WBC # FLD AUTO: 8.43 K/UL — SIGNIFICANT CHANGE UP (ref 4.8–10.8)

## 2020-04-16 PROCEDURE — 93306 TTE W/DOPPLER COMPLETE: CPT | Mod: 26

## 2020-04-16 PROCEDURE — 99222 1ST HOSP IP/OBS MODERATE 55: CPT

## 2020-04-16 RX ORDER — SODIUM CHLORIDE 9 MG/ML
1000 INJECTION INTRAMUSCULAR; INTRAVENOUS; SUBCUTANEOUS
Refills: 0 | Status: DISCONTINUED | OUTPATIENT
Start: 2020-04-16 | End: 2020-04-16

## 2020-04-16 RX ORDER — SODIUM CHLORIDE 9 MG/ML
1000 INJECTION INTRAMUSCULAR; INTRAVENOUS; SUBCUTANEOUS
Refills: 0 | Status: DISCONTINUED | OUTPATIENT
Start: 2020-04-16 | End: 2020-04-17

## 2020-04-16 RX ORDER — INSULIN LISPRO 100/ML
VIAL (ML) SUBCUTANEOUS
Refills: 0 | Status: DISCONTINUED | OUTPATIENT
Start: 2020-04-16 | End: 2020-04-17

## 2020-04-16 RX ORDER — AMLODIPINE BESYLATE 2.5 MG/1
5 TABLET ORAL DAILY
Refills: 0 | Status: DISCONTINUED | OUTPATIENT
Start: 2020-04-16 | End: 2020-04-17

## 2020-04-16 RX ORDER — HEPARIN SODIUM 5000 [USP'U]/ML
1200 INJECTION INTRAVENOUS; SUBCUTANEOUS
Qty: 25000 | Refills: 0 | Status: DISCONTINUED | OUTPATIENT
Start: 2020-04-16 | End: 2020-04-17

## 2020-04-16 RX ADMIN — Medication 25 MILLIGRAM(S): at 05:12

## 2020-04-16 RX ADMIN — LISINOPRIL 20 MILLIGRAM(S): 2.5 TABLET ORAL at 05:12

## 2020-04-16 RX ADMIN — CILOSTAZOL 100 MILLIGRAM(S): 100 TABLET ORAL at 05:12

## 2020-04-16 RX ADMIN — Medication 25 MILLIGRAM(S): at 17:28

## 2020-04-16 RX ADMIN — HEPARIN SODIUM 1400 UNIT(S)/HR: 5000 INJECTION INTRAVENOUS; SUBCUTANEOUS at 22:02

## 2020-04-16 RX ADMIN — CILOSTAZOL 100 MILLIGRAM(S): 100 TABLET ORAL at 17:28

## 2020-04-16 RX ADMIN — TAMSULOSIN HYDROCHLORIDE 0.4 MILLIGRAM(S): 0.4 CAPSULE ORAL at 21:55

## 2020-04-16 RX ADMIN — MORPHINE SULFATE 2 MILLIGRAM(S): 50 CAPSULE, EXTENDED RELEASE ORAL at 05:22

## 2020-04-16 RX ADMIN — Medication 2: at 17:29

## 2020-04-16 RX ADMIN — SODIUM CHLORIDE 75 MILLILITER(S): 9 INJECTION INTRAMUSCULAR; INTRAVENOUS; SUBCUTANEOUS at 17:46

## 2020-04-16 RX ADMIN — AMLODIPINE BESYLATE 5 MILLIGRAM(S): 2.5 TABLET ORAL at 17:28

## 2020-04-16 RX ADMIN — HEPARIN SODIUM 1200 UNIT(S)/HR: 5000 INJECTION INTRAVENOUS; SUBCUTANEOUS at 17:48

## 2020-04-16 RX ADMIN — SODIUM CHLORIDE 50 MILLILITER(S): 9 INJECTION INTRAMUSCULAR; INTRAVENOUS; SUBCUTANEOUS at 00:00

## 2020-04-16 NOTE — PROGRESS NOTE ADULT - SUBJECTIVE AND OBJECTIVE BOX
MARCELA TOWNSEND  86y Male   [id number]    Hospital Day: 2  Post Operative Day:  Procedure:  Patient is a 86y old  Male who presents with a chief complaint of LLE Angiogram (15 Apr 2020 23:01)    PAST MEDICAL & SURGICAL HISTORY:      Events of the Last 24h:  Vital Signs Last 24 Hrs  T(C): 36.4 (16 Apr 2020 00:18), Max: 36.9 (15 Apr 2020 21:17)  T(F): 97.6 (16 Apr 2020 00:18), Max: 98.4 (15 Apr 2020 21:17)  HR: 72 (16 Apr 2020 00:18) (72 - 91)  BP: 168/80 (16 Apr 2020 00:18) (162/70 - 192/90)  BP(mean): --  RR: 18 (16 Apr 2020 00:18) (18 - 19)  SpO2: 100% (15 Apr 2020 15:00) (100% - 100%)        Diet, Regular (04-15-20 @ 18:19)  Diet, NPO after Midnight:      NPO Start Date: 15-Apr-2020,   NPO Start Time: 23:59 (04-15-20 @ 18:19)      I&O's Summary   I&O's Detail      MEDICATIONS  (STANDING):  aspirin  chewable 81 milliGRAM(s) Oral daily  cilostazol 100 milliGRAM(s) Oral two times a day  dextrose 5%. 1000 milliLiter(s) (50 mL/Hr) IV Continuous <Continuous>  dextrose 50% Injectable 12.5 Gram(s) IV Push once  dextrose 50% Injectable 25 Gram(s) IV Push once  dextrose 50% Injectable 25 Gram(s) IV Push once  heparin  Infusion.  Unit(s)/Hr (13 mL/Hr) IV Continuous <Continuous>  insulin lispro (HumaLOG) corrective regimen sliding scale   SubCutaneous three times a day before meals  lisinopril 20 milliGRAM(s) Oral daily  metoprolol tartrate Oral Tab/Cap - Peds 25 milliGRAM(s) Oral two times a day  simvastatin Oral Tab/Cap - Peds 40 milliGRAM(s) Oral daily  sodium chloride 0.9%. 1000 milliLiter(s) (50 mL/Hr) IV Continuous <Continuous>  tamsulosin 0.4 milliGRAM(s) Oral at bedtime    MEDICATIONS  (PRN):  dextrose 40% Gel 15 Gram(s) Oral once PRN Blood Glucose LESS THAN 70 milliGRAM(s)/deciliter  glucagon  Injectable 1 milliGRAM(s) IntraMuscular once PRN Glucose LESS THAN 70 milligrams/deciliter  morphine  - Injectable 2 milliGRAM(s) IV Push every 4 hours PRN Moderate Pain (4 - 6)      PHYSICAL EXAM:    GENERAL: NAD    HEENT: NCAT    CHEST/LUNGS: CTAB    HEART: RRR,  No murmurs, rubs, or gallops    ABDOMEN: SNTND +BS    EXTREMITIES:  FROM, No clubbing, cyanosis, or edema, palpable pulse    NEURO: No focal neurological deficits    SKIN: No rashes or lesions    INCISION/WOUNDS:                          11.2   6.82  )-----------( 202      ( 15 Apr 2020 14:28 )             33.5        CBC Full  -  ( 15 Apr 2020 14:28 )  WBC Count : 6.82 K/uL  RBC Count : 3.66 M/uL  Hemoglobin : 11.2 g/dL  Hematocrit : 33.5 %  Platelet Count - Automated : 202 K/uL  Mean Cell Volume : 91.5 fL  Mean Cell Hemoglobin : 30.6 pg  Mean Cell Hemoglobin Concentration : 33.4 g/dL  Auto Neutrophil # : 3.38 K/uL  Auto Lymphocyte # : 2.72 K/uL  Auto Monocyte # : 0.61 K/uL  Auto Eosinophil # : 0.08 K/uL  Auto Basophil # : 0.02 K/uL  Auto Neutrophil % : 49.6 %  Auto Lymphocyte % : 39.9 %  Auto Monocyte % : 8.9 %  Auto Eosinophil % : 1.2 %  Auto Basophil % : 0.3 %               x     |  x     |  x                  Ca: x      BMP:   ----------------------------< x      Mg: x     (04-15-20 @ 21:05)             4.7    |  x     | x                  Ph: x        LFT:     TPro: 6.8 / Alb: 4.3 / TBili: 0.8 / DBili: x / AST: 20 / ALT: 14 / AlkPhos: 101   (04-15-20 @ 14:28)    LIVER FUNCTIONS - ( 15 Apr 2020 14:28 )  Alb: 4.3 g/dL / Pro: 6.8 g/dL / ALK PHOS: 101 U/L / ALT: 14 U/L / AST: 20 U/L / GGT: x           PT/INR - ( 15 Apr 2020 14:25 )   PT: 11.70 sec;   INR: 1.02 ratio         PTT - ( 15 Apr 2020 14:25 )  PTT:24.9 sec

## 2020-04-17 ENCOUNTER — RESULT REVIEW (OUTPATIENT)
Age: 85
End: 2020-04-17

## 2020-04-17 LAB
ANION GAP SERPL CALC-SCNC: 10 MMOL/L — SIGNIFICANT CHANGE UP (ref 7–14)
APTT BLD: 22.1 SEC — CRITICAL LOW (ref 27–39.2)
BASOPHILS # BLD AUTO: 0.02 K/UL — SIGNIFICANT CHANGE UP (ref 0–0.2)
BASOPHILS NFR BLD AUTO: 0.2 % — SIGNIFICANT CHANGE UP (ref 0–1)
BUN SERPL-MCNC: 20 MG/DL — SIGNIFICANT CHANGE UP (ref 10–20)
CALCIUM SERPL-MCNC: 8.9 MG/DL — SIGNIFICANT CHANGE UP (ref 8.5–10.1)
CHLORIDE SERPL-SCNC: 104 MMOL/L — SIGNIFICANT CHANGE UP (ref 98–110)
CK MB CFR SERPL CALC: 2.9 NG/ML — SIGNIFICANT CHANGE UP (ref 0.6–6.3)
CK SERPL-CCNC: 157 U/L — SIGNIFICANT CHANGE UP (ref 0–225)
CO2 SERPL-SCNC: 26 MMOL/L — SIGNIFICANT CHANGE UP (ref 17–32)
CREAT SERPL-MCNC: 1.2 MG/DL — SIGNIFICANT CHANGE UP (ref 0.7–1.5)
EOSINOPHIL # BLD AUTO: 0.01 K/UL — SIGNIFICANT CHANGE UP (ref 0–0.7)
EOSINOPHIL NFR BLD AUTO: 0.1 % — SIGNIFICANT CHANGE UP (ref 0–8)
GLUCOSE BLDC GLUCOMTR-MCNC: 125 MG/DL — HIGH (ref 70–99)
GLUCOSE BLDC GLUCOMTR-MCNC: 171 MG/DL — HIGH (ref 70–99)
GLUCOSE BLDC GLUCOMTR-MCNC: 182 MG/DL — HIGH (ref 70–99)
GLUCOSE BLDC GLUCOMTR-MCNC: 211 MG/DL — HIGH (ref 70–99)
GLUCOSE SERPL-MCNC: 183 MG/DL — HIGH (ref 70–99)
HCT VFR BLD CALC: 30.6 % — LOW (ref 42–52)
HCT VFR BLD CALC: 33.9 % — LOW (ref 42–52)
HGB BLD-MCNC: 10.4 G/DL — LOW (ref 14–18)
HGB BLD-MCNC: 11.5 G/DL — LOW (ref 14–18)
IMM GRANULOCYTES NFR BLD AUTO: 0.2 % — SIGNIFICANT CHANGE UP (ref 0.1–0.3)
INR BLD: 1.06 RATIO — SIGNIFICANT CHANGE UP (ref 0.65–1.3)
LYMPHOCYTES # BLD AUTO: 1.36 K/UL — SIGNIFICANT CHANGE UP (ref 1.2–3.4)
LYMPHOCYTES # BLD AUTO: 12.9 % — LOW (ref 20.5–51.1)
MAGNESIUM SERPL-MCNC: 1.8 MG/DL — SIGNIFICANT CHANGE UP (ref 1.8–2.4)
MCHC RBC-ENTMCNC: 30.8 PG — SIGNIFICANT CHANGE UP (ref 27–31)
MCHC RBC-ENTMCNC: 31.5 PG — HIGH (ref 27–31)
MCHC RBC-ENTMCNC: 33.9 G/DL — SIGNIFICANT CHANGE UP (ref 32–37)
MCHC RBC-ENTMCNC: 34 G/DL — SIGNIFICANT CHANGE UP (ref 32–37)
MCV RBC AUTO: 90.9 FL — SIGNIFICANT CHANGE UP (ref 80–94)
MCV RBC AUTO: 92.7 FL — SIGNIFICANT CHANGE UP (ref 80–94)
MONOCYTES # BLD AUTO: 0.79 K/UL — HIGH (ref 0.1–0.6)
MONOCYTES NFR BLD AUTO: 7.5 % — SIGNIFICANT CHANGE UP (ref 1.7–9.3)
NEUTROPHILS # BLD AUTO: 8.31 K/UL — HIGH (ref 1.4–6.5)
NEUTROPHILS NFR BLD AUTO: 79.1 % — HIGH (ref 42.2–75.2)
NRBC # BLD: 0 /100 WBCS — SIGNIFICANT CHANGE UP (ref 0–0)
NRBC # BLD: 0 /100 WBCS — SIGNIFICANT CHANGE UP (ref 0–0)
PHOSPHATE SERPL-MCNC: 3 MG/DL — SIGNIFICANT CHANGE UP (ref 2.1–4.9)
PLATELET # BLD AUTO: 160 K/UL — SIGNIFICANT CHANGE UP (ref 130–400)
PLATELET # BLD AUTO: 190 K/UL — SIGNIFICANT CHANGE UP (ref 130–400)
POTASSIUM SERPL-MCNC: 4.8 MMOL/L — SIGNIFICANT CHANGE UP (ref 3.5–5)
POTASSIUM SERPL-SCNC: 4.8 MMOL/L — SIGNIFICANT CHANGE UP (ref 3.5–5)
PROTHROM AB SERPL-ACNC: 12.2 SEC — SIGNIFICANT CHANGE UP (ref 9.95–12.87)
RBC # BLD: 3.3 M/UL — LOW (ref 4.7–6.1)
RBC # BLD: 3.73 M/UL — LOW (ref 4.7–6.1)
RBC # FLD: 12.9 % — SIGNIFICANT CHANGE UP (ref 11.5–14.5)
RBC # FLD: 12.9 % — SIGNIFICANT CHANGE UP (ref 11.5–14.5)
SODIUM SERPL-SCNC: 140 MMOL/L — SIGNIFICANT CHANGE UP (ref 135–146)
TROPONIN T SERPL-MCNC: <0.01 NG/ML — SIGNIFICANT CHANGE UP
WBC # BLD: 10.51 K/UL — SIGNIFICANT CHANGE UP (ref 4.8–10.8)
WBC # BLD: 7.89 K/UL — SIGNIFICANT CHANGE UP (ref 4.8–10.8)
WBC # FLD AUTO: 10.51 K/UL — SIGNIFICANT CHANGE UP (ref 4.8–10.8)
WBC # FLD AUTO: 7.89 K/UL — SIGNIFICANT CHANGE UP (ref 4.8–10.8)

## 2020-04-17 PROCEDURE — 88304 TISSUE EXAM BY PATHOLOGIST: CPT | Mod: 26

## 2020-04-17 PROCEDURE — 75710 ARTERY X-RAYS ARM/LEG: CPT | Mod: 26

## 2020-04-17 PROCEDURE — 88311 DECALCIFY TISSUE: CPT | Mod: 26

## 2020-04-17 PROCEDURE — 36246 INS CATH ABD/L-EXT ART 2ND: CPT | Mod: 59

## 2020-04-17 PROCEDURE — 71045 X-RAY EXAM CHEST 1 VIEW: CPT | Mod: 26

## 2020-04-17 PROCEDURE — 35656 BPG FEMORAL-POPLITEAL: CPT

## 2020-04-17 PROCEDURE — 76937 US GUIDE VASCULAR ACCESS: CPT | Mod: 26

## 2020-04-17 RX ORDER — SENNA PLUS 8.6 MG/1
2 TABLET ORAL DAILY
Refills: 0 | Status: DISCONTINUED | OUTPATIENT
Start: 2020-04-17 | End: 2020-04-22

## 2020-04-17 RX ORDER — INSULIN GLARGINE 100 [IU]/ML
15 INJECTION, SOLUTION SUBCUTANEOUS EVERY MORNING
Refills: 0 | Status: DISCONTINUED | OUTPATIENT
Start: 2020-04-17 | End: 2020-04-17

## 2020-04-17 RX ORDER — OXYCODONE AND ACETAMINOPHEN 5; 325 MG/1; MG/1
1 TABLET ORAL EVERY 4 HOURS
Refills: 0 | Status: DISCONTINUED | OUTPATIENT
Start: 2020-04-17 | End: 2020-04-19

## 2020-04-17 RX ORDER — LISINOPRIL 2.5 MG/1
20 TABLET ORAL ONCE
Refills: 0 | Status: COMPLETED | OUTPATIENT
Start: 2020-04-17 | End: 2020-04-17

## 2020-04-17 RX ORDER — SODIUM CHLORIDE 9 MG/ML
1000 INJECTION, SOLUTION INTRAVENOUS
Refills: 0 | Status: DISCONTINUED | OUTPATIENT
Start: 2020-04-17 | End: 2020-04-19

## 2020-04-17 RX ORDER — INSULIN LISPRO 100/ML
VIAL (ML) SUBCUTANEOUS
Refills: 0 | Status: DISCONTINUED | OUTPATIENT
Start: 2020-04-17 | End: 2020-04-17

## 2020-04-17 RX ORDER — METOPROLOL TARTRATE 50 MG
25 TABLET ORAL ONCE
Refills: 0 | Status: COMPLETED | OUTPATIENT
Start: 2020-04-17 | End: 2020-04-17

## 2020-04-17 RX ORDER — ONDANSETRON 8 MG/1
4 TABLET, FILM COATED ORAL EVERY 6 HOURS
Refills: 0 | Status: DISCONTINUED | OUTPATIENT
Start: 2020-04-17 | End: 2020-04-22

## 2020-04-17 RX ORDER — INSULIN GLARGINE 100 [IU]/ML
18 INJECTION, SOLUTION SUBCUTANEOUS AT BEDTIME
Refills: 0 | Status: DISCONTINUED | OUTPATIENT
Start: 2020-04-17 | End: 2020-04-20

## 2020-04-17 RX ORDER — SODIUM CHLORIDE 9 MG/ML
1000 INJECTION, SOLUTION INTRAVENOUS
Refills: 0 | Status: DISCONTINUED | OUTPATIENT
Start: 2020-04-17 | End: 2020-04-20

## 2020-04-17 RX ORDER — ASPIRIN/CALCIUM CARB/MAGNESIUM 324 MG
81 TABLET ORAL DAILY
Refills: 0 | Status: DISCONTINUED | OUTPATIENT
Start: 2020-04-17 | End: 2020-04-22

## 2020-04-17 RX ORDER — RIVAROXABAN 15 MG-20MG
10 KIT ORAL DAILY
Refills: 0 | Status: DISCONTINUED | OUTPATIENT
Start: 2020-04-17 | End: 2020-04-22

## 2020-04-17 RX ORDER — AMLODIPINE BESYLATE 2.5 MG/1
5 TABLET ORAL DAILY
Refills: 0 | Status: DISCONTINUED | OUTPATIENT
Start: 2020-04-17 | End: 2020-04-22

## 2020-04-17 RX ORDER — AMLODIPINE BESYLATE 2.5 MG/1
5 TABLET ORAL ONCE
Refills: 0 | Status: COMPLETED | OUTPATIENT
Start: 2020-04-17 | End: 2020-04-17

## 2020-04-17 RX ORDER — LANOLIN ALCOHOL/MO/W.PET/CERES
5 CREAM (GRAM) TOPICAL AT BEDTIME
Refills: 0 | Status: DISCONTINUED | OUTPATIENT
Start: 2020-04-17 | End: 2020-04-22

## 2020-04-17 RX ORDER — METOPROLOL TARTRATE 50 MG
25 TABLET ORAL EVERY 12 HOURS
Refills: 0 | Status: DISCONTINUED | OUTPATIENT
Start: 2020-04-17 | End: 2020-04-22

## 2020-04-17 RX ORDER — LISINOPRIL 2.5 MG/1
20 TABLET ORAL DAILY
Refills: 0 | Status: DISCONTINUED | OUTPATIENT
Start: 2020-04-17 | End: 2020-04-22

## 2020-04-17 RX ORDER — ATORVASTATIN CALCIUM 80 MG/1
40 TABLET, FILM COATED ORAL AT BEDTIME
Refills: 0 | Status: DISCONTINUED | OUTPATIENT
Start: 2020-04-17 | End: 2020-04-22

## 2020-04-17 RX ORDER — GLUCAGON INJECTION, SOLUTION 0.5 MG/.1ML
1 INJECTION, SOLUTION SUBCUTANEOUS ONCE
Refills: 0 | Status: DISCONTINUED | OUTPATIENT
Start: 2020-04-17 | End: 2020-04-20

## 2020-04-17 RX ORDER — INSULIN LISPRO 100/ML
VIAL (ML) SUBCUTANEOUS
Refills: 0 | Status: DISCONTINUED | OUTPATIENT
Start: 2020-04-17 | End: 2020-04-20

## 2020-04-17 RX ORDER — TAMSULOSIN HYDROCHLORIDE 0.4 MG/1
0.4 CAPSULE ORAL AT BEDTIME
Refills: 0 | Status: DISCONTINUED | OUTPATIENT
Start: 2020-04-17 | End: 2020-04-22

## 2020-04-17 RX ORDER — CILOSTAZOL 100 MG/1
100 TABLET ORAL
Refills: 0 | Status: DISCONTINUED | OUTPATIENT
Start: 2020-04-17 | End: 2020-04-22

## 2020-04-17 RX ORDER — INSULIN LISPRO 100/ML
4 VIAL (ML) SUBCUTANEOUS
Refills: 0 | Status: DISCONTINUED | OUTPATIENT
Start: 2020-04-17 | End: 2020-04-20

## 2020-04-17 RX ORDER — MORPHINE SULFATE 50 MG/1
2 CAPSULE, EXTENDED RELEASE ORAL EVERY 4 HOURS
Refills: 0 | Status: DISCONTINUED | OUTPATIENT
Start: 2020-04-17 | End: 2020-04-19

## 2020-04-17 RX ORDER — CEFAZOLIN SODIUM 1 G
2000 VIAL (EA) INJECTION EVERY 8 HOURS
Refills: 0 | Status: COMPLETED | OUTPATIENT
Start: 2020-04-17 | End: 2020-04-18

## 2020-04-17 RX ORDER — TAMSULOSIN HYDROCHLORIDE 0.4 MG/1
0.4 CAPSULE ORAL ONCE
Refills: 0 | Status: COMPLETED | OUTPATIENT
Start: 2020-04-17 | End: 2020-04-17

## 2020-04-17 RX ADMIN — CILOSTAZOL 100 MILLIGRAM(S): 100 TABLET ORAL at 17:51

## 2020-04-17 RX ADMIN — LISINOPRIL 20 MILLIGRAM(S): 2.5 TABLET ORAL at 17:37

## 2020-04-17 RX ADMIN — Medication 2: at 18:21

## 2020-04-17 RX ADMIN — ATORVASTATIN CALCIUM 40 MILLIGRAM(S): 80 TABLET, FILM COATED ORAL at 22:04

## 2020-04-17 RX ADMIN — Medication 100 MILLIGRAM(S): at 22:04

## 2020-04-17 RX ADMIN — Medication 25 MILLIGRAM(S): at 17:52

## 2020-04-17 RX ADMIN — TAMSULOSIN HYDROCHLORIDE 0.4 MILLIGRAM(S): 0.4 CAPSULE ORAL at 17:51

## 2020-04-17 RX ADMIN — TAMSULOSIN HYDROCHLORIDE 0.4 MILLIGRAM(S): 0.4 CAPSULE ORAL at 22:19

## 2020-04-17 RX ADMIN — AMLODIPINE BESYLATE 5 MILLIGRAM(S): 2.5 TABLET ORAL at 17:32

## 2020-04-17 NOTE — CONSULT NOTE ADULT - ASSESSMENT
86M DM, HTN, BPH, CAD, CABG, PVD, left lower extremity rest pain  4/17 underwent LLE angiogram followed by CFA endarterectomy and femoral to below knee popliteal bypass (PTFE)  Being admitted under SICU for post operative care     - PVD s/p CFA endarterectomy and bypass  Pain control with Percocet prn pain and Dilauded 0.5 mg q 4 prn pain  Bed rest today  Pulses checks q1 hr  Resume diet regular (carbohydrate)  Resume ASA and Pletal  Start Xarelto 10 mg po daily (minimal dose) to maintain bypass patent. D/C HOME on XARELTO 2.5 mg po daily  Complete post op Abx with Ancef q8h x 3 doses    - HTN/CAD  hypertensive. Resume all his home medications including Lisinopril 20 mg po daily, Metoprolol 25 mg po q12hr, Amlodipine 5 mg po daily    - DM  Lantus 15 u every morning, and RISS coverage    - BPH  Resume Flomax 0.4 mg po daily  Wells placed in OR  Noted hematuria likely from heparin use during procedure, vs traumatic insertion (hx BPH), vs infection  Will send off UA to r/o UTI  Remove wells midnight    -Hyperlipidemia  Resume Atorvastatin 40 mg po qHS    DVT prophylaxis: Xarelto  GI: Senna    LABS: post op labs including CBC, BMP, coags, Mg/Phos, troponin  EKG post op    Case d/w Dr. Garcia

## 2020-04-17 NOTE — CHART NOTE - NSCHARTNOTEFT_GEN_A_CORE
Spoke with Endocrinologist Dr Rodriguez over the phone. Dr. Rodriguez recommended 18 of lantus for basal FS coverage, with insulin sliding scale for additional coverage. Spoke with Endocrinologist Dr Rodriguez over the phone. Dr. Rodriguez recommended 18 of lantus for basal FS coverage, with insulin sliding scale for additional coverage. Dr Rodriguez also recommended d/c metformin after d/c due to elevated creatinine

## 2020-04-17 NOTE — PROGRESS NOTE ADULT - SUBJECTIVE AND OBJECTIVE BOX
Record reviewed, patient discussed in detail with resident (Dr. Otero).  Pt is an 87 yo man with known Type 2 diabetes and CAD.  He underwent popliteal artery bypass surgery earlier today.   Prior to admission the patient was treated with metformin and sitagliptin.  He was started on Lantus insulin 15 units qAM plus correction dose of lispro insulin.  He also has a history of hypothyroidism but is not on thyroid hormone replacement.    O:  Comprehensive Metabolic Panel (04.15.20 @ 14:28)    Sodium, Serum: 134 mmol/L    Potassium, Serum: 5.1: Slighty Hemolyzed use with Caution mmol/L    Chloride, Serum: 97 mmol/L    Carbon Dioxide, Serum: 27 mmol/L    Anion Gap, Serum: 10 mmol/L    Blood Urea Nitrogen, Serum: 31 mg/dL    Creatinine, Serum: 1.4 mg/dL    Glucose, Serum: 198 mg/dL    Calcium, Total Serum: 9.8 mg/dL    Protein Total, Serum: 6.8 g/dL    Albumin, Serum: 4.3 g/dL    Bilirubin Total, Serum: 0.8 mg/dL    Alkaline Phosphatase, Serum: 101 U/L    Aspartate Aminotransferase (AST/SGOT): 20: Hemolyzed. Interpret with caution U/L    Alanine Aminotransferase (ALT/SGPT): 14 U/L    eGFR if Non : 45           A1C with Estimated Average Glucose (04.16.20 @ 04:30)    A1C with Estimated Average Glucose Result: 8.4    Estimated Average Glucose: 194    CAPILLARY BLOOD GLUCOSE:  POCT Blood Glucose.: 182 mg/dL (17 Apr 2020 18:13)  POCT Blood Glucose.: 211 mg/dL (17 Apr 2020 15:26)  POCT Blood Glucose.: 171 mg/dL (17 Apr 2020 06:38)      Assessment and suggestions:  1. Diabetes- glycemic control chronically and acutely sub-optimal.  Suggest increase in insulin doses and use of basal-bolus regimen whilie hospitalized (i.e.: 18 units Lantus insulin qAM and 4 units lispro a.c.  plus correction scale).  At discharge would  not re-start metformin because of impaired renal  function.  Rather, use Lantus at same dose as in hospital and restart sitagliptin.      2. history of hypothyroidism.  Suggest measurement of serum TSH and free T4.    Will continue  to follow patient with you.

## 2020-04-17 NOTE — BRIEF OPERATIVE NOTE - NSICDXBRIEFPROCEDURE_GEN_ALL_CORE_FT
PROCEDURES:  Endarterectomy, common femoral 17-Apr-2020 13:46:28  Eduardo Herring  Angiogram, extremity, left 17-Apr-2020 13:44:13  Eduardo Herring  Creation, bypass, arterial, femoral to popliteal, using PTFE graft 17-Apr-2020 13:43:41  Eduardo Herring

## 2020-04-17 NOTE — PROGRESS NOTE ADULT - ASSESSMENT
86M w/PMHx of DM, HTN, HLD, CAD, hypothyroidism, anemia, BPH, nephrolithiasis admitted for LLE angiogram on 4/16 for evaluation of LLE arterial disease.       Plan:   -Continue heparin drip until patient is in pre-op  -Keep NPO  -Patient is pre-oped and ready for OR today for above listed procedure.

## 2020-04-17 NOTE — PROGRESS NOTE ADULT - SUBJECTIVE AND OBJECTIVE BOX
GENERAL SURGERY PROGRESS NOTE     MARCELA TOWNSEND  86y  Male  Hospital day :2d  POD:  Procedure:     OVERNIGHT EVENTS: Planned for Angiogram, possible femoral endarterectomy, possible fem-fem bypass 4/16, surgery was delayed due to more emergent case- Plan is to have operation done today. The patient will continue on a heparin drip until in pre-op, NPO after midnight and pre-op labs are done.     T(F): 96.8 (04-16-20 @ 19:56), Max: 97.5 (04-16-20 @ 05:08)  HR: 73 (04-16-20 @ 19:56) (64 - 73)  BP: 121/67 (04-16-20 @ 19:56) (121/67 - 165/70)  ABP: --  ABP(mean): --  RR: 20 (04-16-20 @ 19:56) (18 - 20)  SpO2: --    DIET/FLUIDS: dextrose 5%. 1000 milliLiter(s) IV Continuous <Continuous>  sodium chloride 0.9%. 1000 milliLiter(s) IV Continuous <Continuous>         GI proph:    AC/ proph: aspirin  chewable 81 milliGRAM(s) Oral daily  heparin  Infusion. 1200 Unit(s)/Hr IV Continuous <Continuous>    ABx:     PHYSICAL EXAM:  GENERAL: NAD, well-appearing  CHEST/LUNG: Clear to auscultation bilaterally  HEART: Regular rate and rhythm  ABDOMEN: Soft, Nontender, Nondistended;   EXTREMITIES:  No clubbing, cyanosis, or edema      LABS  Labs:  CAPILLARY BLOOD GLUCOSE      POCT Blood Glucose.: 207 mg/dL (16 Apr 2020 17:03)  POCT Blood Glucose.: 224 mg/dL (16 Apr 2020 12:18)  POCT Blood Glucose.: 251 mg/dL (16 Apr 2020 08:54)                          10.8   8.43  )-----------( 196      ( 16 Apr 2020 20:51 )             32.2         04-16    139  |  104  |  31<H>  ----------------------------<  233<H>  4.2   |  25  |  1.5      Calcium, Total Serum: 9.4 mg/dL (04-16-20 @ 20:51)      LFTs:             6.8  | 0.8  | 20       ------------------[101     ( 15 Apr 2020 14:28 )  4.3  | x    | 14          Lipase:x      Amylase:x         Lactate, Blood: 2.0 mmol/L (04-15-20 @ 14:28)      Coags:     12.20  ----< 1.06    ( 16 Apr 2020 20:51 )     56.0

## 2020-04-17 NOTE — CONSULT NOTE ADULT - SUBJECTIVE AND OBJECTIVE BOX
SICU Consultation Note  =====================================================  HPI:  86M w/PMHx of DM, HTN, HLD, CAD, CABG, hypothyroidism, anemia, BPH, nephrolithiasis who was sent in from Dr. Herring's outpatient office for left leg pain at rest with known iliac/CFA stenosis found on prior CTA (no treatment/intervention). He was sent in for LLE angiogram on 4/16 for evaluation of LLE arterial disease. (15 Apr 2020 15:25)   86y male  with multiple risk factors and medical issues (see above), presented with left lower extremity rest pain, arterial duplex in ED revealed CFA severe disease. Pt underwent cardiac evaluation prior an angiogram and bypass surgery. Cards: Dr. Rice, Intermediate risk on the basis of patient history and age, recommended to add Amlodipine to home medications for better HTN control.   On 4/17 pt underwent left lower extremity angiogram revealing CFA and popliteal stenosis. Case proceeded with CFA endarterectomy and femoral to below knee popliteal artery bypass using PTFE.  Post op, pt was successfully extubated and maintained on non-rebreather mask. Alert and oriented, conversive.  + hematuria noted. /90 at the end of the case      Surgery Information  OR time:  4.5 hrs    EBL:   200    UO:             IV Fluids: 3600     Blood Products:       1 uPRBC      PAST MEDICAL & SURGICAL HISTORY:      Allergies    No Known Allergies    Intolerances      SH:  Home Medications:  benazepril 20 mg tablet:  (15 Apr 2020 15:35)  cilostazol 100 mg tablet:  (15 Apr 2020 15:35)  ezetimibe 10 mg tablet:  (15 Apr 2020 15:35)  Januvia 100 mg tablet:  (15 Apr 2020 15:35)  metformin 1,000 mg tablet:  (15 Apr 2020 15:35)  metoprolol tartrate 25 mg tablet:  (15 Apr 2020 15:35)  simvastatin 40 mg tablet:  (15 Apr 2020 15:35)  tamsulosin 0.4 mg capsule:  (15 Apr 2020 15:35)    Advanced Directives: Full Code     ROS:  [ x] A ten-point review of systems was otherwise negative except as noted.  [ ] Due to altered mental status/intubation, subjective information were not able to be obtained from the patient. History was obtained, to the extent possible, from review of the chart and collateral sources of information.      CURRENT MEDICATIONS:   --------------------------------------------------------------------------------------  Neurologic Medications  morphine  - Injectable 2 milliGRAM(s) IV Push every 4 hours PRN Moderate Pain (4 - 6)  ondansetron Injectable 4 milliGRAM(s) IV Push every 6 hours PRN Nausea  oxycodone    5 mG/acetaminophen 325 mG 1 Tablet(s) Oral every 4 hours PRN Moderate Pain (4 - 6)    Respiratory Medications    Cardiovascular Medications  amLODIPine   Tablet 5 milliGRAM(s) Oral daily  lisinopril 20 milliGRAM(s) Oral daily  metoprolol tartrate 25 milliGRAM(s) Oral every 12 hours  tamsulosin 0.4 milliGRAM(s) Oral at bedtime    Gastrointestinal Medications  multivitamin 1 Tablet(s) Oral daily  senna 2 Tablet(s) Oral daily PRN Constipation    Genitourinary Medications    Hematologic/Oncologic Medications  aspirin  chewable 81 milliGRAM(s) Oral daily  cilostazol 100 milliGRAM(s) Oral two times a day  rivaroxaban 10 milliGRAM(s) Oral daily    Antimicrobial/Immunologic Medications  ceFAZolin   IVPB 2000 milliGRAM(s) IV Intermittent every 8 hours    Endocrine/Metabolic Medications  atorvastatin 40 milliGRAM(s) Oral at bedtime  insulin glargine Injectable (LANTUS) 15 Unit(s) SubCutaneous every morning  insulin lispro (HumaLOG) corrective regimen sliding scale   SubCutaneous three times a day before meals    Topical/Other Medications    --------------------------------------------------------------------------------------    Vital Signs Last 24 Hrs  T(C): 36.8 (17 Apr 2020 07:32), Max: 36.8 (17 Apr 2020 04:47)  T(F): 98.2 (17 Apr 2020 04:47), Max: 98.2 (17 Apr 2020 04:47)  HR: 84 (17 Apr 2020 07:32) (73 - 84)  BP: 157/70 (17 Apr 2020 07:32) (121/67 - 157/70)  BP(mean): --  RR: 18 (17 Apr 2020 07:32) (18 - 20)  SpO2: --  I&O's Detail    16 Apr 2020 07:01  -  17 Apr 2020 07:00  --------------------------------------------------------  IN:  Total IN: 0 mL    OUT:    Voided: 600 mL  Total OUT: 600 mL    Total NET: -600 mL        I&O's Summary    16 Apr 2020 07:01  -  17 Apr 2020 07:00  --------------------------------------------------------  IN: 0 mL / OUT: 600 mL / NET: -600 mL        LABS:                          10.4   7.89  )-----------( 190      ( 17 Apr 2020 05:50 )             30.6     04-16    139  |  104  |  31<H>  ----------------------------<  233<H>  4.2   |  25  |  1.5    Ca    9.4      16 Apr 2020 20:51  Mg     2.0     04-16        PT/INR - ( 16 Apr 2020 20:51 )   PT: 12.20 sec;   INR: 1.06 ratio         PTT - ( 16 Apr 2020 20:51 )  PTT:56.0 sec        EXAM:  General/Neuro  A&O x 3, intact  Exam: Normal, NAD, alert, oriented x 3, no focal deficits. PERRLA     Respiratory  Exam: Lungs clear to auscultation, Normal expansion/effort.       Cardiovascular  Exam: S1, S2.  Regular rate and rhythm.   Cardiac Rhythm: Normal Sinus Rhythm  ECHO:   < from: Transthoracic Echocardiogram (04.16.20 @ 07:35) >  Summary:   1. LV Ejection Fraction by Smith's Method with a biplane EF of 62 %.   2. Spectral Doppler shows impaired relaxation pattern of left ventricular myocardial filling (Grade I diastolic dysfunction).   3. Mild to moderate mitral valve regurgitation.   4. Sclerotic aortic valve with decreased opening.   5. There is moderate aortic root calcification.        GI  Exam: Abdomen soft, Non-tender, Non-distended.    Wound:   left lower extremity with dressing at the incision c/d/i  Current Diet:  carbohydrate/no snacks    Extremities  Exam: Extremities warm, pink, well-perfused.   Peripheral edema    Derm:  Exam: Good skin turgor, no skin breakdown.      :   Exam: Diamond catheter in place. + hematuria    Tubes/Lines/Drains  ***  [x] Peripheral IV  [] Central Venous Line     	[] R	[] L	[] IJ	[] Fem	[] SC        Type:	    Date Placed:   [x] Arterial Line		[] R	[x] L	[] Fem	[x] Rad	[] Ax	Date Placed: 4/17  [] PICC:         	[] Midline		[] Mediport           [x] Urinary Catheter		Date Placed: 4/17

## 2020-04-17 NOTE — CHART NOTE - NSCHARTNOTEFT_GEN_A_CORE
PACU ANESTHESIA ADMISSION NOTE      Procedure:  Angiogram, extremity, left  Creation, bypass, arterial, femoral to popliteal, using PTFE graft    Post op diagnosis:  Ischemic rest pain of lower extremity      ____  Intubated  TV:______       Rate: ______      FiO2: ______    _x___  Patent Airway    _x___  Full return of protective reflexes    _x___  Full recovery from anesthesia / back to baseline status    Vitals:  T(F):   97.3   HR:  75  BP: 158/70  RR: 16  SpO2: 100%    Mental Status:  _x___ Awake   __x___ Alert   _____ Drowsy   _____ Sedated    Nausea/Vomiting:  _x___  NO       ______Yes,   See Post - Op Orders         Pain Scale (0-10):  __0___    Treatment: _x___ None    ____ See Post - Op/PCA Orders    Post - Operative Fluids:   ____ Oral   _x___ See Post - Op Orders    Plan: Discharge:   ____Home       _____Floor     ___x__Critical Care    _____  Other:_________________    Comments:  No anesthesia issues or complications noted.      Patient transported to the cardiac cath unit, receiving RN at bedside.  Report given and all questions answered.

## 2020-04-17 NOTE — CHART NOTE - NSCHARTNOTEFT_GEN_A_CORE
Vascular Surgery Post-Op Note, PCN:     Pre-Op Dx: ISCHEMIA EXTREMITY  Ischemic rest pain of lower extremity  Ischemia of extremity    Procedure: Endarterectomy, common femoral  Angiogram, extremity, left  Creation, bypass, arterial, femoral to popliteal, using PTFE graft    Surgeon: Dr. Herring    Subjective: 86M s/p Left CFA endarterectomy and Fem-bk POP PTFE 4/17. Dressing was saturated, wound was oozing, changed dressing. The patient is resting comfortably in bed, reports no pain.     Vital Signs Last 24 Hrs  T(C): 36.8 (17 Apr 2020 07:32), Max: 36.8 (17 Apr 2020 04:47)  T(F): 98.2 (17 Apr 2020 04:47), Max: 98.2 (17 Apr 2020 04:47)  HR: 84 (17 Apr 2020 07:32) (84 - 84)  BP: 157/70 (17 Apr 2020 07:32) (157/70 - 157/70)  BP(mean): --  RR: 18 (17 Apr 2020 07:32) (18 - 18)  SpO2: --    Physical Exam:  General: NAD, resting comfortably in bed  Pulmonary: Nonlabored breathing, no respiratory distress  Cardiovascular: NSR  Abdominal: soft, NT/ND  Extremities: Left behind the knee dressing, gauze secured with tegaderm. WWP, normal strength  Neuro: A/O x 3, CNs II-XII grossly intact, normal motor/sensation, no focal deficits  Pulses:   Right:                                                                          Left:  FEM [ ]2+ [ ]1+ [ ]doppler                                             FEM [ ]2+ [ ]1+ [ ]doppler    POP [ ]2+ [ ]1+ [ ]doppler                                             POP [ ]2+ [ ]1+ [ ]doppler    DP [ ]2+ [ ]1+ [ ]doppler                                                DP [ x]2+ [ ]1+ [ ]doppler  PT[ ]2+ [ ]1+ [ ]doppler                                                  PT [x ]2+ [ ]1+ [ ]doppler      LABS:                        10.4   7.89  )-----------( 190      ( 17 Apr 2020 05:50 )             30.6     04-16    139  |  104  |  31<H>  ----------------------------<  233<H>  4.2   |  25  |  1.5    Ca    9.4      16 Apr 2020 20:51  Mg     2.0     04-16      PT/INR - ( 16 Apr 2020 20:51 )   PT: 12.20 sec;   INR: 1.06 ratio         PTT - ( 16 Apr 2020 20:51 )  PTT:56.0 sec  CAPILLARY BLOOD GLUCOSE      POCT Blood Glucose.: 182 mg/dL (17 Apr 2020 18:13)  POCT Blood Glucose.: 211 mg/dL (17 Apr 2020 15:26)  POCT Blood Glucose.: 171 mg/dL (17 Apr 2020 06:38)              Assessment:86y Male s/p above procedure    Plan:  Pain/nausea control PRN  Home meds  Incentive spirometer/OOB/Ambulate  Vitals per protocol  IVF, Diet: ANA  D/C Lobo at midnight Vascular Surgery Post-Op Note, PCN:     Pre-Op Dx: ISCHEMIA EXTREMITY  Ischemic rest pain of lower extremity  Ischemia of extremity    Procedure: Endarterectomy, common femoral  Angiogram, extremity, left  Creation, bypass, arterial, femoral to popliteal, using PTFE graft    Surgeon: Dr. Herring    Subjective: 86M s/p Left CFA endarterectomy and Fem-bk POP PTFE 4/17. Dressing was saturated, wound was oozing, changed dressing. The patient is resting comfortably in bed, reports no pain.     Vital Signs Last 24 Hrs  T(C): 36.8 (17 Apr 2020 07:32), Max: 36.8 (17 Apr 2020 04:47)  T(F): 98.2 (17 Apr 2020 04:47), Max: 98.2 (17 Apr 2020 04:47)  HR: 84 (17 Apr 2020 07:32) (84 - 84)  BP: 157/70 (17 Apr 2020 07:32) (157/70 - 157/70)  BP(mean): --  RR: 18 (17 Apr 2020 07:32) (18 - 18)  SpO2: --    Physical Exam:  General: NAD, resting comfortably in bed  Pulmonary: Nonlabored breathing, no respiratory distress  Cardiovascular: NSR  Abdominal: soft, NT/ND  Extremities: Left posterior knee dressing cleand and dry; gauze secured with tegaderm. Distal pulses present, extremity warm to the touch. normal strength  Neuro: A/O x 3, CNs II-XII grossly intact, normal motor/sensation, no focal deficits  Pulses:   Right:                                                                          Left:  FEM [ ]2+ [ ]1+ [ ]doppler                                             FEM [ ]2+ [ ]1+ [ ]doppler    POP [ ]2+ [ ]1+ [ ]doppler                                             POP [ ]2+ [ ]1+ [ ]doppler    DP [ ]2+ [ ]1+ [ ]doppler                                                DP [ x]2+ [ ]1+ [ ]doppler  PT[ ]2+ [ ]1+ [ ]doppler                                                  PT [x ]2+ [ ]1+ [ ]doppler      LABS:                        10.4   7.89  )-----------( 190      ( 17 Apr 2020 05:50 )             30.6     04-16    139  |  104  |  31<H>  ----------------------------<  233<H>  4.2   |  25  |  1.5    Ca    9.4      16 Apr 2020 20:51  Mg     2.0     04-16      PT/INR - ( 16 Apr 2020 20:51 )   PT: 12.20 sec;   INR: 1.06 ratio         PTT - ( 16 Apr 2020 20:51 )  PTT:56.0 sec  CAPILLARY BLOOD GLUCOSE      POCT Blood Glucose.: 182 mg/dL (17 Apr 2020 18:13)  POCT Blood Glucose.: 211 mg/dL (17 Apr 2020 15:26)  POCT Blood Glucose.: 171 mg/dL (17 Apr 2020 06:38)              Assessment:86y Male s/p above procedure    Plan:  Pain/nausea control PRN  Home meds  Incentive spirometer/OOB/Ambulate  Vitals per protocol  IVF, Diet: ANA  D/SHIRAZ Diamond at midnight

## 2020-04-18 LAB
GLUCOSE BLDC GLUCOMTR-MCNC: 164 MG/DL — HIGH (ref 70–99)
GLUCOSE BLDC GLUCOMTR-MCNC: 192 MG/DL — HIGH (ref 70–99)
GLUCOSE BLDC GLUCOMTR-MCNC: 193 MG/DL — HIGH (ref 70–99)
GLUCOSE BLDC GLUCOMTR-MCNC: 211 MG/DL — HIGH (ref 70–99)
GLUCOSE BLDC GLUCOMTR-MCNC: 255 MG/DL — HIGH (ref 70–99)
T4 AB SER-ACNC: 7 UG/DL — SIGNIFICANT CHANGE UP (ref 4.6–12)
TSH SERPL-MCNC: 0.99 UIU/ML — SIGNIFICANT CHANGE UP (ref 0.27–4.2)

## 2020-04-18 PROCEDURE — 71045 X-RAY EXAM CHEST 1 VIEW: CPT | Mod: 26

## 2020-04-18 RX ORDER — MAGNESIUM SULFATE 500 MG/ML
1 VIAL (ML) INJECTION ONCE
Refills: 0 | Status: COMPLETED | OUTPATIENT
Start: 2020-04-18 | End: 2020-04-18

## 2020-04-18 RX ADMIN — TAMSULOSIN HYDROCHLORIDE 0.4 MILLIGRAM(S): 0.4 CAPSULE ORAL at 21:43

## 2020-04-18 RX ADMIN — Medication 4 UNIT(S): at 11:47

## 2020-04-18 RX ADMIN — Medication 4 UNIT(S): at 05:59

## 2020-04-18 RX ADMIN — Medication 100 MILLIGRAM(S): at 05:46

## 2020-04-18 RX ADMIN — LISINOPRIL 20 MILLIGRAM(S): 2.5 TABLET ORAL at 05:48

## 2020-04-18 RX ADMIN — Medication 5 MILLIGRAM(S): at 00:28

## 2020-04-18 RX ADMIN — Medication 25 MILLIGRAM(S): at 16:34

## 2020-04-18 RX ADMIN — Medication 81 MILLIGRAM(S): at 10:58

## 2020-04-18 RX ADMIN — AMLODIPINE BESYLATE 5 MILLIGRAM(S): 2.5 TABLET ORAL at 05:42

## 2020-04-18 RX ADMIN — Medication 25 MILLIGRAM(S): at 05:49

## 2020-04-18 RX ADMIN — Medication 1 TABLET(S): at 10:58

## 2020-04-18 RX ADMIN — Medication 2: at 08:39

## 2020-04-18 RX ADMIN — RIVAROXABAN 10 MILLIGRAM(S): KIT at 10:59

## 2020-04-18 RX ADMIN — Medication 2: at 05:57

## 2020-04-18 RX ADMIN — Medication 2: at 16:33

## 2020-04-18 RX ADMIN — ATORVASTATIN CALCIUM 40 MILLIGRAM(S): 80 TABLET, FILM COATED ORAL at 21:43

## 2020-04-18 RX ADMIN — SODIUM CHLORIDE 100 MILLILITER(S): 9 INJECTION, SOLUTION INTRAVENOUS at 21:48

## 2020-04-18 RX ADMIN — CILOSTAZOL 100 MILLIGRAM(S): 100 TABLET ORAL at 05:47

## 2020-04-18 RX ADMIN — CILOSTAZOL 100 MILLIGRAM(S): 100 TABLET ORAL at 16:34

## 2020-04-18 RX ADMIN — Medication 4 UNIT(S): at 16:33

## 2020-04-18 RX ADMIN — Medication 100 GRAM(S): at 09:12

## 2020-04-18 RX ADMIN — INSULIN GLARGINE 18 UNIT(S): 100 INJECTION, SOLUTION SUBCUTANEOUS at 21:48

## 2020-04-18 RX ADMIN — Medication 5 MILLIGRAM(S): at 21:43

## 2020-04-18 NOTE — PROGRESS NOTE ADULT - ASSESSMENT
86M w/PMHx of DM, HTN, HLD, CAD, hypothyroidism, anemia, BPH, nephrolithiasis S/P LLE angiogram, Left CFA endarterectomy and Fem-BK POP PTFE 4/17    Plan:   -Diamond discontinued at midnight, follow up trial of void  -Endocrinology : Added lantus 18 units daily, add lispro 4 units qac, continue sliding scale, Check TSH, T4. Will follow up glucose levels.  -Continue RD, IVL  -Bedrest for 24 hours, then PT/Rehab  -Incentive spirometer  -HSQ, PTX

## 2020-04-18 NOTE — PROGRESS NOTE ADULT - SUBJECTIVE AND OBJECTIVE BOX
GENERAL SURGERY PROGRESS NOTE     MARCELA TOWNSEND  86y  Male  Hospital day :3d  POD:  Procedure: Endarterectomy, common femoral  Angiogram, extremity, left  Creation, bypass, arterial, femoral to popliteal, using PTFE graft    OVERNIGHT EVENTS: Patient went to the OR for above procedure. Post op labs WNL. The patient is doing well, reports minimal discomfort.  Endocrinology saw the patient, recommended lantus 18units qd, Lispro 4 units QAC and a sliding scale. Discharge instructions include holding metformin and continuing lantus and restarting sitagliptin. Recommended checking TSH, T4. Diamond discontinued at midnight    T(F): 98.2 (04-17-20 @ 04:47), Max: 98.2 (04-17-20 @ 04:47)  HR: 84 (04-17-20 @ 07:32) (84 - 84)  BP: 157/70 (04-17-20 @ 07:32) (157/70 - 157/70)  ABP: --  ABP(mean): --  RR: 18 (04-17-20 @ 07:32) (18 - 18)  SpO2: --    DIET/FLUIDS: dextrose 5%. 1000 milliLiter(s) IV Continuous <Continuous>  lactated ringers. 1000 milliLiter(s) IV Continuous <Continuous>  multivitamin 1 Tablet(s) Oral daily         GI proph:    AC/ proph: aspirin  chewable 81 milliGRAM(s) Oral daily    ABx: ceFAZolin   IVPB 2000 milliGRAM(s) IV Intermittent every 8 hours      PHYSICAL EXAM:  GENERAL: NAD, well-appearing  CHEST/LUNG: Clear to auscultation bilaterally  HEART: Regular rate and rhythm  ABDOMEN: Soft, Nontender, Nondistended;   EXTREMITIES:  Left posterior knee dressing : gauze secured with tegaderm. Distal pulses present.   Neuro: A/O x 3, CNs II-XII grossly intact, normal motor/sensation, no focal deficits No clubbing, cyanosis, or edema      LABS  Labs:  CAPILLARY BLOOD GLUCOSE      POCT Blood Glucose.: 125 mg/dL (17 Apr 2020 21:56)  POCT Blood Glucose.: 182 mg/dL (17 Apr 2020 18:13)  POCT Blood Glucose.: 211 mg/dL (17 Apr 2020 15:26)  POCT Blood Glucose.: 171 mg/dL (17 Apr 2020 06:38)                          11.5   10.51 )-----------( 160      ( 17 Apr 2020 22:02 )             33.9       Auto Neutrophil %: 79.1 % (04-17-20 @ 22:02)  Auto Immature Granulocyte %: 0.2 % (04-17-20 @ 22:02)    04-17    140  |  104  |  20  ----------------------------<  183<H>  4.8   |  26  |  1.2      Calcium, Total Serum: 8.9 mg/dL (04-17-20 @ 22:02)      LFTs:     Lactate, Blood: 2.0 mmol/L (04-15-20 @ 14:28)      Coags:     12.20  ----< 1.06    ( 17 Apr 2020 20:49 )     22.1        CARDIAC MARKERS ( 17 Apr 2020 22:02 )  x     / x     / 157 U/L / x     / x      CARDIAC MARKERS ( 17 Apr 2020 22:02 )  x     / x     / x     / x     / 2.9 ng/mL  CARDIAC MARKERS ( 17 Apr 2020 20:43 )  x     / <0.01 ng/mL / x     / x     / x            RADIOLOGY & ADDITIONAL TESTS:  < from: Xray Chest 1 View-PORTABLE IMMEDIATE (04.17.20 @ 16:50) >  Impression:      Right midlung field patchy opacity. Recommend continued follow-up.    < end of copied text >

## 2020-04-19 LAB
ANION GAP SERPL CALC-SCNC: 11 MMOL/L — SIGNIFICANT CHANGE UP (ref 7–14)
BASOPHILS # BLD AUTO: 0.02 K/UL — SIGNIFICANT CHANGE UP (ref 0–0.2)
BASOPHILS NFR BLD AUTO: 0.2 % — SIGNIFICANT CHANGE UP (ref 0–1)
BUN SERPL-MCNC: 18 MG/DL — SIGNIFICANT CHANGE UP (ref 10–20)
CALCIUM SERPL-MCNC: 8.4 MG/DL — LOW (ref 8.5–10.1)
CHLORIDE SERPL-SCNC: 102 MMOL/L — SIGNIFICANT CHANGE UP (ref 98–110)
CO2 SERPL-SCNC: 24 MMOL/L — SIGNIFICANT CHANGE UP (ref 17–32)
CREAT SERPL-MCNC: 1.4 MG/DL — SIGNIFICANT CHANGE UP (ref 0.7–1.5)
EOSINOPHIL # BLD AUTO: 0.04 K/UL — SIGNIFICANT CHANGE UP (ref 0–0.7)
EOSINOPHIL NFR BLD AUTO: 0.4 % — SIGNIFICANT CHANGE UP (ref 0–8)
GLUCOSE BLDC GLUCOMTR-MCNC: 221 MG/DL — HIGH (ref 70–99)
GLUCOSE BLDC GLUCOMTR-MCNC: 223 MG/DL — HIGH (ref 70–99)
GLUCOSE BLDC GLUCOMTR-MCNC: 223 MG/DL — HIGH (ref 70–99)
GLUCOSE BLDC GLUCOMTR-MCNC: 265 MG/DL — HIGH (ref 70–99)
GLUCOSE SERPL-MCNC: 225 MG/DL — HIGH (ref 70–99)
HCT VFR BLD CALC: 29.3 % — LOW (ref 42–52)
HGB BLD-MCNC: 10.3 G/DL — LOW (ref 14–18)
IMM GRANULOCYTES NFR BLD AUTO: 0.3 % — SIGNIFICANT CHANGE UP (ref 0.1–0.3)
LYMPHOCYTES # BLD AUTO: 2.11 K/UL — SIGNIFICANT CHANGE UP (ref 1.2–3.4)
LYMPHOCYTES # BLD AUTO: 20.2 % — LOW (ref 20.5–51.1)
MAGNESIUM SERPL-MCNC: 1.9 MG/DL — SIGNIFICANT CHANGE UP (ref 1.8–2.4)
MCHC RBC-ENTMCNC: 32 PG — HIGH (ref 27–31)
MCHC RBC-ENTMCNC: 35.2 G/DL — SIGNIFICANT CHANGE UP (ref 32–37)
MCV RBC AUTO: 91 FL — SIGNIFICANT CHANGE UP (ref 80–94)
MONOCYTES # BLD AUTO: 1.2 K/UL — HIGH (ref 0.1–0.6)
MONOCYTES NFR BLD AUTO: 11.5 % — HIGH (ref 1.7–9.3)
NEUTROPHILS # BLD AUTO: 7.07 K/UL — HIGH (ref 1.4–6.5)
NEUTROPHILS NFR BLD AUTO: 67.4 % — SIGNIFICANT CHANGE UP (ref 42.2–75.2)
NRBC # BLD: 0 /100 WBCS — SIGNIFICANT CHANGE UP (ref 0–0)
PHOSPHATE SERPL-MCNC: 2.7 MG/DL — SIGNIFICANT CHANGE UP (ref 2.1–4.9)
PLATELET # BLD AUTO: 157 K/UL — SIGNIFICANT CHANGE UP (ref 130–400)
POTASSIUM SERPL-MCNC: 3.7 MMOL/L — SIGNIFICANT CHANGE UP (ref 3.5–5)
POTASSIUM SERPL-SCNC: 3.7 MMOL/L — SIGNIFICANT CHANGE UP (ref 3.5–5)
RBC # BLD: 3.22 M/UL — LOW (ref 4.7–6.1)
RBC # FLD: 12.6 % — SIGNIFICANT CHANGE UP (ref 11.5–14.5)
SODIUM SERPL-SCNC: 137 MMOL/L — SIGNIFICANT CHANGE UP (ref 135–146)
WBC # BLD: 10.47 K/UL — SIGNIFICANT CHANGE UP (ref 4.8–10.8)
WBC # FLD AUTO: 10.47 K/UL — SIGNIFICANT CHANGE UP (ref 4.8–10.8)

## 2020-04-19 RX ORDER — OXYCODONE AND ACETAMINOPHEN 5; 325 MG/1; MG/1
1 TABLET ORAL EVERY 4 HOURS
Refills: 0 | Status: DISCONTINUED | OUTPATIENT
Start: 2020-04-19 | End: 2020-04-20

## 2020-04-19 RX ORDER — MORPHINE SULFATE 50 MG/1
2 CAPSULE, EXTENDED RELEASE ORAL EVERY 4 HOURS
Refills: 0 | Status: DISCONTINUED | OUTPATIENT
Start: 2020-04-19 | End: 2020-04-22

## 2020-04-19 RX ORDER — SODIUM,POTASSIUM PHOSPHATES 278-250MG
1 POWDER IN PACKET (EA) ORAL ONCE
Refills: 0 | Status: COMPLETED | OUTPATIENT
Start: 2020-04-19 | End: 2020-04-19

## 2020-04-19 RX ORDER — ACETAMINOPHEN 500 MG
650 TABLET ORAL EVERY 6 HOURS
Refills: 0 | Status: DISCONTINUED | OUTPATIENT
Start: 2020-04-19 | End: 2020-04-22

## 2020-04-19 RX ADMIN — AMLODIPINE BESYLATE 5 MILLIGRAM(S): 2.5 TABLET ORAL at 05:55

## 2020-04-19 RX ADMIN — CILOSTAZOL 100 MILLIGRAM(S): 100 TABLET ORAL at 17:11

## 2020-04-19 RX ADMIN — Medication 5 MILLIGRAM(S): at 21:32

## 2020-04-19 RX ADMIN — Medication 650 MILLIGRAM(S): at 06:03

## 2020-04-19 RX ADMIN — Medication 1 TABLET(S): at 11:53

## 2020-04-19 RX ADMIN — INSULIN GLARGINE 18 UNIT(S): 100 INJECTION, SOLUTION SUBCUTANEOUS at 21:37

## 2020-04-19 RX ADMIN — Medication 81 MILLIGRAM(S): at 11:53

## 2020-04-19 RX ADMIN — Medication 1 PACKET(S): at 11:53

## 2020-04-19 RX ADMIN — LISINOPRIL 20 MILLIGRAM(S): 2.5 TABLET ORAL at 05:55

## 2020-04-19 RX ADMIN — RIVAROXABAN 10 MILLIGRAM(S): KIT at 11:54

## 2020-04-19 RX ADMIN — Medication 6: at 11:52

## 2020-04-19 RX ADMIN — Medication 4 UNIT(S): at 11:52

## 2020-04-19 RX ADMIN — CILOSTAZOL 100 MILLIGRAM(S): 100 TABLET ORAL at 05:55

## 2020-04-19 RX ADMIN — Medication 4: at 08:45

## 2020-04-19 RX ADMIN — Medication 4: at 17:11

## 2020-04-19 RX ADMIN — Medication 4 UNIT(S): at 17:11

## 2020-04-19 RX ADMIN — ATORVASTATIN CALCIUM 40 MILLIGRAM(S): 80 TABLET, FILM COATED ORAL at 21:32

## 2020-04-19 RX ADMIN — Medication 4 UNIT(S): at 08:45

## 2020-04-19 RX ADMIN — Medication 25 MILLIGRAM(S): at 05:55

## 2020-04-19 RX ADMIN — TAMSULOSIN HYDROCHLORIDE 0.4 MILLIGRAM(S): 0.4 CAPSULE ORAL at 21:36

## 2020-04-19 RX ADMIN — Medication 25 MILLIGRAM(S): at 17:11

## 2020-04-19 NOTE — PHYSICAL THERAPY INITIAL EVALUATION ADULT - TRANSFER SAFETY CONCERNS NOTED: BED/CHAIR, REHAB EVAL
decreased weight-shifting ability/decreased balance during turns/decreased safety awareness/stand pivot

## 2020-04-19 NOTE — CONSULT NOTE ADULT - SUBJECTIVE AND OBJECTIVE BOX
HPI:  86M w/PMHx of DM, HTN, HLD, CAD, hypothyroidism, anemia, BPH, nephrolithiasis who was sent in from Dr. Herring's outpatient office for left leg pain at rest with known iliac/CFA stenosis found on prior CTA (no treatment/intervention). He was sent in for LLE angiogram on 4/16 for evaluation of LLE arterial disease. (15 Apr 2020 15:25)      PAST MEDICAL & SURGICAL HISTORY:      Hospital Course:  He is S/P left fem-pop bypass with PTFE graft on the 17th. Premorbidly amb nicely. Today he transfered with max assist of 2 people. Nonamb. He had pain in the LLE that didn't allow him to do more.  TODAY'S SUBJECTIVE & REVIEW OF SYMPTOMS:     Constitutional WNL   Cardio WNL   Resp Denies tob hx.   GI WNL  Heme WNL  Endo DM  Skin WNL  MSK WNL  Neuro WNL  Cognitive WNL  Psych WNL      MEDICATIONS  (STANDING):  amLODIPine   Tablet 5 milliGRAM(s) Oral daily  aspirin  chewable 81 milliGRAM(s) Oral daily  atorvastatin 40 milliGRAM(s) Oral at bedtime  ceFAZolin   IVPB 2000 milliGRAM(s) IV Intermittent every 8 hours  cilostazol 100 milliGRAM(s) Oral two times a day  dextrose 5%. 1000 milliLiter(s) (50 mL/Hr) IV Continuous <Continuous>  insulin glargine Injectable (LANTUS) 18 Unit(s) SubCutaneous at bedtime  insulin lispro (HumaLOG) corrective regimen sliding scale   SubCutaneous three times a day before meals  insulin lispro Injectable (HumaLOG) 4 Unit(s) SubCutaneous three times a day before meals  lactated ringers. 1000 milliLiter(s) (100 mL/Hr) IV Continuous <Continuous>  lisinopril 20 milliGRAM(s) Oral daily  melatonin 5 milliGRAM(s) Oral at bedtime  metoprolol tartrate 25 milliGRAM(s) Oral every 12 hours  multivitamin 1 Tablet(s) Oral daily  potassium phosphate / sodium phosphate powder 1 Packet(s) Oral once  rivaroxaban 10 milliGRAM(s) Oral daily  tamsulosin 0.4 milliGRAM(s) Oral at bedtime    MEDICATIONS  (PRN):  acetaminophen   Tablet .. 650 milliGRAM(s) Oral every 6 hours PRN Temp greater or equal to 38C (100.4F)  glucagon  Injectable 1 milliGRAM(s) IntraMuscular once PRN Glucose LESS THAN 70 milligrams/deciliter  morphine  - Injectable 2 milliGRAM(s) IV Push every 4 hours PRN Severe Pain (7 - 10)  ondansetron Injectable 4 milliGRAM(s) IV Push every 6 hours PRN Nausea  oxycodone    5 mG/acetaminophen 325 mG 1 Tablet(s) Oral every 4 hours PRN Moderate Pain (4 - 6)  senna 2 Tablet(s) Oral daily PRN Constipation      FAMILY HISTORY:      Allergies    No Known Allergies    Intolerances        SOCIAL HISTORY:    [  ] Etoh  [  ] Smoking  [  ] Substance abuse     Home Environment:  [x  ] Home Alone  [x  ] A dtr will be able to stay with him when he goes home.  [  ] Home Health Aid    Dwelling:  [  ] Apartment  [  ] Private House  [  ] Adult Home  [  ] Skilled Nursing Facility      [  ] Short Term  [  ] Long Term  [ x ] Stairs-7 steps to enter and no steps inside       Elevator [  ]    FUNCTIONAL STATUS PTA: (Check all that apply)  Ambulation: [ x  ]Independent    [  ] Dependent     [  ] Non-Ambulatory  Assistive Device: [  ] SA Cane  [  ]  Q Cane  [  ] Walker  [  ]  Wheelchair  ADL : [x  ] Independent  [  ]  Dependent       Vital Signs Last 24 Hrs  T(C): 37.6 (19 Apr 2020 09:19), Max: 38.1 (19 Apr 2020 05:15)  T(F): 99.6 (19 Apr 2020 09:19), Max: 100.6 (19 Apr 2020 05:15)  HR: 103 (19 Apr 2020 05:15) (78 - 103)  BP: 177/74 (19 Apr 2020 05:15) (143/44 - 177/74)  BP(mean): --  RR: 20 (19 Apr 2020 05:15) (16 - 20)  SpO2: 97% (19 Apr 2020 09:19) (96% - 99%)      PHYSICAL EXAM: Alert & Oriented X3  GENERAL: NAD, well-groomed, well-developed  HEAD:  Atraumatic, Normocephalic  EYES: EOMI, PERRLA, conjunctiva and sclera clear  NECK: Supple, No JVD, Normal thyroid  CHEST/LUNG: Clear to percussion bilaterally; No rales, rhonchi, wheezing, or rubs  HEART: Regular rate and rhythm; No murmurs, rubs, or gallops  ABDOMEN: Soft, Nontender, Nondistended; Bowel sounds present  EXTREMITIES:  dressing LLE; trace LLE pedal edema; no breakdown right foot; right foot is warm    NERVOUS SYSTEM:  Cranial Nerves 2-12 intact [  ] Abnormal  [  ]  ROM: WFL all extremities [  ]  Abnormal [  ]  Motor Strength: WFL all extremities  [  ]  Abnormal [  ]  Sensation: intact to light touch [  ] Abnormal [  ]  Reflexes: Symmetric [  ]  Abnormal [  ]    FUNCTIONAL STATUS:  Bed Mobility: Independent [  ]  Supervision [  ]  Needs Assistance [  ]  N/A [  ]  Transfers: Independent [  ]  Supervision [  ]  Needs Assistance [  ]  N/A [  ]   Ambulation: Independent [  ]  Supervision [  ]  Needs Assistance [  ]  N/A [  ]  ADL: Independent [  ] Requires Assistance [  ] N/A [  ]      LABS:                        10.3   10.47 )-----------( 157      ( 19 Apr 2020 06:38 )             29.3     04-19    137  |  102  |  18  ----------------------------<  225<H>  3.7   |  24  |  1.4    Ca    8.4<L>      19 Apr 2020 06:38  Phos  2.7     04-19  Mg     1.9     04-19      PT/INR - ( 17 Apr 2020 20:49 )   PT: 12.20 sec;   INR: 1.06 ratio         PTT - ( 17 Apr 2020 20:49 )  PTT:22.1 sec      RADIOLOGY & ADDITIONAL STUDIES:    Assesment:

## 2020-04-19 NOTE — PHYSICAL THERAPY INITIAL EVALUATION ADULT - TRANSFER SAFETY CONCERNS NOTED: SIT/STAND, REHAB EVAL
decreased weight-shifting ability/losing balance/decreased safety awareness/decreased sequencing ability

## 2020-04-19 NOTE — PHYSICAL THERAPY INITIAL EVALUATION ADULT - DID THE PATIENT HAVE SURGERY?
Endarterectomy, common femoral, Angiogram, L LE, Creation, bypass, arterial, femoral to popliteal, using PTFE graft/yes

## 2020-04-19 NOTE — CONSULT NOTE ADULT - ASSESSMENT
IMPRESSION: Rehab of  gait abnormaility, peripheral arterial disease, left LE ischemia, s/p left fem-pop bypass with PTFE graft    PRECAUTIONS: [x  ] Cardiac  [  ] Respiratory  [  ] Seizures [  ] Contact Isolation  [  ] Droplet Isolation  [  ] Other    Weight Bearing Status: WBAT LLE    RECOMMENDATION:    Out of Bed to Chair     DVT/Decubiti Prophylaxis    REHAB PLAN:     [ x  ] Bedside P/T 3-5 times a week   [   ]   Bedside O/T  2-3 times a week             [   ] No Rehab Therapy Indicated                   [   ]  Speech Therapy   Conditioning/ROM                                    ADL  Bed Mobility                                               Conditioning/ROM  Transfers                                                     Bed Mobility  Sitting /Standing Balance                         Transfers                                        Gait Training                                               Sitting/Standing Balance  Stair Training [   ]Applicable                    Home equipment Eval                                                                        Splinting  [   ] Only      GOALS:   ADL   [ x  ]   Independent                    Transfers  [ x  ] Independent                          Ambulation  [ x  ] Independent     [ x   ] With device                            [   ]  CG                                                         [   ]  CG                                                                  [   ] CG                            [    ] Min A                                                   [   ] Min A                                                              [   ] Min  A          DISCHARGE PLAN:   [   ]  Good candidate for Intensive Rehabilitation/Hospital based-4A [facility]                                             Will tolerate 3hrs Intensive Rehab Daily                                       [    ]  Short Term Rehab in Skilled Nursing Facility                                       [  x  ]  Home with Outpatient or  services. He didn't do well with PT today and lives alone. He can benefit from acute care PT for 2 additional days. He will have a dtr stay with him so he will not be alone. He wishes to go home with a rolling walker, bedside 3 in 1 commode and home PT. He will need pain medications before PT.  He is not interested in going to a SNF.                                          [    ]  Possible Candidate for Intensive Hospital based Rehab IMPRESSION: Rehab of  gait abnormaility, peripheral arterial disease, left LE ischemia, s/p left fem-pop bypass with PTFE graft    PRECAUTIONS: [x  ] Cardiac  [  ] Respiratory  [  ] Seizures [  ] Contact Isolation  [  ] Droplet Isolation  [  ] Other    Weight Bearing Status: WBAT LLE    RECOMMENDATION:    Out of Bed to Chair     DVT/Decubiti Prophylaxis    REHAB PLAN:     [ x  ] Bedside P/T 3-5 times a week   [   ]   Bedside O/T  2-3 times a week             [   ] No Rehab Therapy Indicated                   [   ]  Speech Therapy   Conditioning/ROM                                    ADL  Bed Mobility                                               Conditioning/ROM  Transfers                                                     Bed Mobility  Sitting /Standing Balance                         Transfers                                        Gait Training                                               Sitting/Standing Balance  Stair Training [   ]Applicable                    Home equipment Eval                                                                        Splinting  [   ] Only      GOALS:   ADL   [ x  ]   Independent                    Transfers  [ x  ] Independent                          Ambulation  [ x  ] Independent     [ x   ] With device                            [   ]  CG                                                         [   ]  CG                                                                  [   ] CG                            [    ] Min A                                                   [   ] Min A                                                              [   ] Min  A          DISCHARGE PLAN:   [   ]  Good candidate for Intensive Rehabilitation/Hospital based-4A [facility]                                             Will tolerate 3hrs Intensive Rehab Daily                                       [    ]  Short Term Rehab in Skilled Nursing Facility                                       [  x  ]  Home with Outpatient or VN services. He didn't do well with PT today and lives alone normally. I suggest that he can benefit from acute care PT for 2 or 3 additional days. He will have a dtr stay with him so he will not be alone. He wishes to go home with a rolling walker, bedside 3 in 1 commode and home PT. He will need pain medications before PT.  He is not interested in going to a SNF.                                          [    ]  Possible Candidate for Intensive Hospital based Rehab

## 2020-04-19 NOTE — PHYSICAL THERAPY INITIAL EVALUATION ADULT - THERAPY FREQUENCY, PT EVAL
Mom wants to know if she can give baby rice cereal. Pt is not sleeping at night. Mom has concerns hes not doing things a baby his age should do.  Contact number is [phone] 3-5x/week

## 2020-04-19 NOTE — PHYSICAL THERAPY INITIAL EVALUATION ADULT - GENERAL OBSERVATIONS, REHAB EVAL
PT IE completed. Patient encountered sitting at b/s recliner, in NAD, +IV, +wound dressing L post knee, agreeable to therapy. [id number] c/o severe pain L LE upon movt and upon weight bearing activities. Stand pivot for transfers Max A x 2.

## 2020-04-19 NOTE — PROGRESS NOTE ADULT - SUBJECTIVE AND OBJECTIVE BOX
GENERAL SURGERY PROGRESS NOTE     MARCELA TOWNSEND  86y  Male  Hospital day :4d  POD:  Procedure: Endarterectomy, common femoral  Angiogram, extremity, left  Creation, bypass, arterial, femoral to popliteal, using PTFE graft    OVERNIGHT EVENTS: Patient was downgraded from the ICU. A line was removed, wells was removed-passed trial of void. The patient is tolerating diet, reports pain is controlled.     T(F): 99.7 (04-18-20 @ 20:00), Max: 99.7 (04-18-20 @ 20:00)  HR: 83 (04-18-20 @ 20:00) (70 - 83)  BP: 144/76 (04-18-20 @ 20:00) (115/40 - 144/76)  ABP: --  ABP(mean): --  RR: 18 (04-18-20 @ 20:00) (16 - 18)  SpO2: 97% (04-18-20 @ 18:49) (95% - 99%)    DIET/FLUIDS: dextrose 5%. 1000 milliLiter(s) IV Continuous <Continuous>  lactated ringers. 1000 milliLiter(s) IV Continuous <Continuous>  multivitamin 1 Tablet(s) Oral daily    GI proph:    AC/ proph: aspirin  chewable 81 milliGRAM(s) Oral daily    ABx: ceFAZolin   IVPB 2000 milliGRAM(s) IV Intermittent every 8 hours      PHYSICAL EXAM:  GENERAL: NAD, well-appearing  CHEST/LUNG: Clear to auscultation bilaterally  HEART: Regular rate and rhythm  ABDOMEN: Soft, Nontender, Nondistended;   EXTREMITIES:  EXTREMITIES:  Left posterior knee dressing : gauze secured with tegaderm. Distal pulses present.   Neuro: A/O x 3, CNs II-XII grossly intact, normal motor/sensation, no focal deficits No clubbing, cyanosis, or edema      LABS  Labs:  CAPILLARY BLOOD GLUCOSE      POCT Blood Glucose.: 211 mg/dL (18 Apr 2020 21:45)  POCT Blood Glucose.: 193 mg/dL (18 Apr 2020 16:02)  POCT Blood Glucose.: 255 mg/dL (18 Apr 2020 11:40)  POCT Blood Glucose.: 164 mg/dL (18 Apr 2020 07:53)  POCT Blood Glucose.: 192 mg/dL (18 Apr 2020 05:51)                          11.5   10.51 )-----------( 160      ( 17 Apr 2020 22:02 )             33.9         04-17    140  |  104  |  20  ----------------------------<  183<H>  4.8   |  26  |  1.2          LFTs:         Coags:     12.20  ----< 1.06    ( 17 Apr 2020 20:49 )     22.1        CARDIAC MARKERS ( 17 Apr 2020 22:02 )  x     / x     / 157 U/L / x     / x      CARDIAC MARKERS ( 17 Apr 2020 22:02 )  x     / x     / x     / x     / 2.9 ng/mL  CARDIAC MARKERS ( 17 Apr 2020 20:43 )  x     / <0.01 ng/mL / x     / x     / x                      RADIOLOGY & ADDITIONAL TESTS:  < from: Xray Chest 1 View- PORTABLE-Routine (04.18.20 @ 07:38) >  Impression:    Stable right perihilar opacity. No pleural effusions or pneumothorax    < end of copied text >

## 2020-04-19 NOTE — PHYSICAL THERAPY INITIAL EVALUATION ADULT - PLANNED THERAPY INTERVENTIONS, PT EVAL
balance training/bed mobility training/gait training/neuromuscular re-education/postural re-education/strengthening/transfer training

## 2020-04-19 NOTE — PROGRESS NOTE ADULT - ASSESSMENT
86M w/PMHx of DM, HTN, HLD, CAD, hypothyroidism, anemia, BPH, nephrolithiasis S/P LLE angiogram, Left CFA endarterectomy and Fem-BK POP PTFE 4/17    Plan:   -Trend glucose, insulin adjustments made  -Continue RD, IVL  -PT/Rehab  -HSQ, PTX

## 2020-04-19 NOTE — PHYSICAL THERAPY INITIAL EVALUATION ADULT - PERTINENT HX OF CURRENT PROBLEM, REHAB EVAL
86M w/PMHx of DM, HTN, HLD, CAD, hypothyroidism, anemia, BPH, nephrolithiasis who was sent in from Dr. Herring's outpatient office for left leg pain at rest with known iliac/CFA stenosis found on prior CTA (no treatment/intervention). He was sent in for LLE angiogram on 4/16 for evaluation of LLE arterial disease. S/P 4/18 Endarterectomy, common femoral, Angiogram, L LE, Creation, bypass, arterial, femoral to popliteal, using PTFE graft

## 2020-04-20 LAB
ANION GAP SERPL CALC-SCNC: 9 MMOL/L — SIGNIFICANT CHANGE UP (ref 7–14)
APPEARANCE UR: CLEAR — SIGNIFICANT CHANGE UP
BACTERIA # UR AUTO: NEGATIVE — SIGNIFICANT CHANGE UP
BASOPHILS # BLD AUTO: 0.02 K/UL — SIGNIFICANT CHANGE UP (ref 0–0.2)
BASOPHILS NFR BLD AUTO: 0.2 % — SIGNIFICANT CHANGE UP (ref 0–1)
BILIRUB UR-MCNC: NEGATIVE — SIGNIFICANT CHANGE UP
BUN SERPL-MCNC: 26 MG/DL — HIGH (ref 10–20)
CALCIUM SERPL-MCNC: 8.5 MG/DL — SIGNIFICANT CHANGE UP (ref 8.5–10.1)
CHLORIDE SERPL-SCNC: 101 MMOL/L — SIGNIFICANT CHANGE UP (ref 98–110)
CO2 SERPL-SCNC: 26 MMOL/L — SIGNIFICANT CHANGE UP (ref 17–32)
COLOR SPEC: YELLOW — SIGNIFICANT CHANGE UP
CREAT SERPL-MCNC: 1.5 MG/DL — SIGNIFICANT CHANGE UP (ref 0.7–1.5)
DIFF PNL FLD: ABNORMAL
EOSINOPHIL # BLD AUTO: 0.08 K/UL — SIGNIFICANT CHANGE UP (ref 0–0.7)
EOSINOPHIL NFR BLD AUTO: 0.8 % — SIGNIFICANT CHANGE UP (ref 0–8)
EPI CELLS # UR: 1 /HPF — SIGNIFICANT CHANGE UP (ref 0–5)
GLUCOSE BLDC GLUCOMTR-MCNC: 114 MG/DL — HIGH (ref 70–99)
GLUCOSE BLDC GLUCOMTR-MCNC: 140 MG/DL — HIGH (ref 70–99)
GLUCOSE BLDC GLUCOMTR-MCNC: 204 MG/DL — HIGH (ref 70–99)
GLUCOSE BLDC GLUCOMTR-MCNC: 248 MG/DL — HIGH (ref 70–99)
GLUCOSE SERPL-MCNC: 212 MG/DL — HIGH (ref 70–99)
GLUCOSE UR QL: NEGATIVE — SIGNIFICANT CHANGE UP
HCT VFR BLD CALC: 25.7 % — LOW (ref 42–52)
HGB BLD-MCNC: 8.8 G/DL — LOW (ref 14–18)
HYALINE CASTS # UR AUTO: 6 /LPF — SIGNIFICANT CHANGE UP (ref 0–7)
IMM GRANULOCYTES NFR BLD AUTO: 0.3 % — SIGNIFICANT CHANGE UP (ref 0.1–0.3)
KETONES UR-MCNC: NEGATIVE — SIGNIFICANT CHANGE UP
LEUKOCYTE ESTERASE UR-ACNC: NEGATIVE — SIGNIFICANT CHANGE UP
LYMPHOCYTES # BLD AUTO: 2.69 K/UL — SIGNIFICANT CHANGE UP (ref 1.2–3.4)
LYMPHOCYTES # BLD AUTO: 26.4 % — SIGNIFICANT CHANGE UP (ref 20.5–51.1)
MAGNESIUM SERPL-MCNC: 2 MG/DL — SIGNIFICANT CHANGE UP (ref 1.8–2.4)
MCHC RBC-ENTMCNC: 31 PG — SIGNIFICANT CHANGE UP (ref 27–31)
MCHC RBC-ENTMCNC: 34.2 G/DL — SIGNIFICANT CHANGE UP (ref 32–37)
MCV RBC AUTO: 90.5 FL — SIGNIFICANT CHANGE UP (ref 80–94)
MONOCYTES # BLD AUTO: 0.85 K/UL — HIGH (ref 0.1–0.6)
MONOCYTES NFR BLD AUTO: 8.3 % — SIGNIFICANT CHANGE UP (ref 1.7–9.3)
NEUTROPHILS # BLD AUTO: 6.52 K/UL — HIGH (ref 1.4–6.5)
NEUTROPHILS NFR BLD AUTO: 64 % — SIGNIFICANT CHANGE UP (ref 42.2–75.2)
NITRITE UR-MCNC: NEGATIVE — SIGNIFICANT CHANGE UP
NRBC # BLD: 0 /100 WBCS — SIGNIFICANT CHANGE UP (ref 0–0)
PH UR: 6 — SIGNIFICANT CHANGE UP (ref 5–8)
PLATELET # BLD AUTO: 142 K/UL — SIGNIFICANT CHANGE UP (ref 130–400)
POTASSIUM SERPL-MCNC: 3.7 MMOL/L — SIGNIFICANT CHANGE UP (ref 3.5–5)
POTASSIUM SERPL-SCNC: 3.7 MMOL/L — SIGNIFICANT CHANGE UP (ref 3.5–5)
PROT UR-MCNC: ABNORMAL
RBC # BLD: 2.84 M/UL — LOW (ref 4.7–6.1)
RBC # FLD: 12.8 % — SIGNIFICANT CHANGE UP (ref 11.5–14.5)
RBC CASTS # UR COMP ASSIST: 12 /HPF — HIGH (ref 0–4)
SODIUM SERPL-SCNC: 136 MMOL/L — SIGNIFICANT CHANGE UP (ref 135–146)
SP GR SPEC: 1.02 — SIGNIFICANT CHANGE UP (ref 1.01–1.02)
UROBILINOGEN FLD QL: SIGNIFICANT CHANGE UP
WBC # BLD: 10.19 K/UL — SIGNIFICANT CHANGE UP (ref 4.8–10.8)
WBC # FLD AUTO: 10.19 K/UL — SIGNIFICANT CHANGE UP (ref 4.8–10.8)
WBC UR QL: 2 /HPF — SIGNIFICANT CHANGE UP (ref 0–5)

## 2020-04-20 PROCEDURE — 71045 X-RAY EXAM CHEST 1 VIEW: CPT | Mod: 26

## 2020-04-20 RX ORDER — DEXTROSE 50 % IN WATER 50 %
12.5 SYRINGE (ML) INTRAVENOUS ONCE
Refills: 0 | Status: DISCONTINUED | OUTPATIENT
Start: 2020-04-20 | End: 2020-04-22

## 2020-04-20 RX ORDER — SODIUM CHLORIDE 9 MG/ML
1000 INJECTION, SOLUTION INTRAVENOUS
Refills: 0 | Status: DISCONTINUED | OUTPATIENT
Start: 2020-04-20 | End: 2020-04-22

## 2020-04-20 RX ORDER — DEXTROSE 50 % IN WATER 50 %
25 SYRINGE (ML) INTRAVENOUS ONCE
Refills: 0 | Status: DISCONTINUED | OUTPATIENT
Start: 2020-04-20 | End: 2020-04-22

## 2020-04-20 RX ORDER — INSULIN GLARGINE 100 [IU]/ML
22 INJECTION, SOLUTION SUBCUTANEOUS AT BEDTIME
Refills: 0 | Status: DISCONTINUED | OUTPATIENT
Start: 2020-04-20 | End: 2020-04-22

## 2020-04-20 RX ORDER — GLUCAGON INJECTION, SOLUTION 0.5 MG/.1ML
1 INJECTION, SOLUTION SUBCUTANEOUS ONCE
Refills: 0 | Status: DISCONTINUED | OUTPATIENT
Start: 2020-04-20 | End: 2020-04-22

## 2020-04-20 RX ORDER — INSULIN LISPRO 100/ML
VIAL (ML) SUBCUTANEOUS
Refills: 0 | Status: DISCONTINUED | OUTPATIENT
Start: 2020-04-20 | End: 2020-04-22

## 2020-04-20 RX ORDER — INSULIN LISPRO 100/ML
6 VIAL (ML) SUBCUTANEOUS
Refills: 0 | Status: DISCONTINUED | OUTPATIENT
Start: 2020-04-20 | End: 2020-04-22

## 2020-04-20 RX ORDER — OXYCODONE HYDROCHLORIDE 5 MG/1
5 TABLET ORAL EVERY 6 HOURS
Refills: 0 | Status: DISCONTINUED | OUTPATIENT
Start: 2020-04-20 | End: 2020-04-22

## 2020-04-20 RX ORDER — DEXTROSE 50 % IN WATER 50 %
15 SYRINGE (ML) INTRAVENOUS ONCE
Refills: 0 | Status: DISCONTINUED | OUTPATIENT
Start: 2020-04-20 | End: 2020-04-22

## 2020-04-20 RX ADMIN — Medication 5 MILLIGRAM(S): at 21:24

## 2020-04-20 RX ADMIN — Medication 6 UNIT(S): at 17:23

## 2020-04-20 RX ADMIN — OXYCODONE HYDROCHLORIDE 5 MILLIGRAM(S): 5 TABLET ORAL at 13:44

## 2020-04-20 RX ADMIN — Medication 4 UNIT(S): at 10:16

## 2020-04-20 RX ADMIN — CILOSTAZOL 100 MILLIGRAM(S): 100 TABLET ORAL at 05:39

## 2020-04-20 RX ADMIN — CILOSTAZOL 100 MILLIGRAM(S): 100 TABLET ORAL at 17:25

## 2020-04-20 RX ADMIN — Medication 25 MILLIGRAM(S): at 17:25

## 2020-04-20 RX ADMIN — Medication 1 TABLET(S): at 12:02

## 2020-04-20 RX ADMIN — Medication 4: at 10:16

## 2020-04-20 RX ADMIN — LISINOPRIL 20 MILLIGRAM(S): 2.5 TABLET ORAL at 05:39

## 2020-04-20 RX ADMIN — Medication 0: at 17:24

## 2020-04-20 RX ADMIN — INSULIN GLARGINE 22 UNIT(S): 100 INJECTION, SOLUTION SUBCUTANEOUS at 21:23

## 2020-04-20 RX ADMIN — TAMSULOSIN HYDROCHLORIDE 0.4 MILLIGRAM(S): 0.4 CAPSULE ORAL at 21:24

## 2020-04-20 RX ADMIN — AMLODIPINE BESYLATE 5 MILLIGRAM(S): 2.5 TABLET ORAL at 05:39

## 2020-04-20 RX ADMIN — OXYCODONE HYDROCHLORIDE 5 MILLIGRAM(S): 5 TABLET ORAL at 22:26

## 2020-04-20 RX ADMIN — Medication 81 MILLIGRAM(S): at 12:02

## 2020-04-20 RX ADMIN — Medication 4 UNIT(S): at 11:58

## 2020-04-20 RX ADMIN — Medication 4: at 11:59

## 2020-04-20 RX ADMIN — ATORVASTATIN CALCIUM 40 MILLIGRAM(S): 80 TABLET, FILM COATED ORAL at 21:23

## 2020-04-20 RX ADMIN — RIVAROXABAN 10 MILLIGRAM(S): KIT at 12:02

## 2020-04-20 RX ADMIN — Medication 25 MILLIGRAM(S): at 05:39

## 2020-04-20 NOTE — PROGRESS NOTE ADULT - ASSESSMENT
86M w/PMHx of DM, HTN, HLD, CAD, hypothyroidism, anemia, BPH, nephrolithiasis S/P LLE angiogram, Left CFA endarterectomy and Fem-BK POP PTFE 4/17    Plan:   -Glucose elevated, will discuss insulin adjustments with endocrinology  -Continue RD, IVL  -PT/Rehab : home with vns after 2-3 days of inpatient PT, needs rolling walker and 3 in 1 commode.   -HSQ, PTX  -Low grade fever x 2, 100.4 and 100.6, CXR pending.

## 2020-04-20 NOTE — PROGRESS NOTE ADULT - SUBJECTIVE AND OBJECTIVE BOX
GENERAL SURGERY PROGRESS NOTE     MARCELA TOWNSEND  86y  Male  Hospital day :5d  POD:  Procedure: Endarterectomy, common femoral  Angiogram, extremity, left  Creation, bypass, arterial, femoral to popliteal, using PTFE graft    OVERNIGHT EVENTS: Patient was seen by PT/Rehab. Did not do much with PT. Physiatry recommended home with VNS after 2-3 days of inpatient physical therapy as his daughter plans to move in with him for 24/hr care. The patient will need scripts for 3 in 1 commode and rolling walker. Otherwise the patient's glucose is still elevated, will discuss increasing/adjusting insulin dose for better coverage. Patient had low grade fever of 100.6 and 100.4 will get CXR.     T(F): 100.4 (04-19-20 @ 20:00), Max: 100.6 (04-19-20 @ 05:15)  HR: 86 (04-19-20 @ 20:00) (86 - 103)  BP: 122/59 (04-19-20 @ 20:00) (121/58 - 177/74)  ABP: --  ABP(mean): --  RR: 18 (04-19-20 @ 20:00) (18 - 20)  SpO2: 96% (04-19-20 @ 17:38) (96% - 97%)    DIET/FLUIDS: dextrose 5%. 1000 milliLiter(s) IV Continuous <Continuous>  multivitamin 1 Tablet(s) Oral daily       GI proph:    AC/ proph: aspirin  chewable 81 milliGRAM(s) Oral daily    ABx: ceFAZolin   IVPB 2000 milliGRAM(s) IV Intermittent every 8 hours    PHYSICAL EXAM:  GENERAL: NAD, well-appearing  CHEST/LUNG: Clear to auscultation bilaterally  HEART: Regular rate and rhythm  ABDOMEN: Soft, Nontender, Nondistended;   EXTREMITIES:  EXTREMITIES:  Left posterior knee dressing : gauze secured with tegaderm. Distal pulses present.   Neuro: A/O x 3, CNs II-XII grossly intact, normal motor/sensation, no focal deficits No clubbing,      LABS  Labs:  CAPILLARY BLOOD GLUCOSE      POCT Blood Glucose.: 223 mg/dL (19 Apr 2020 20:53)  POCT Blood Glucose.: 223 mg/dL (19 Apr 2020 16:57)  POCT Blood Glucose.: 265 mg/dL (19 Apr 2020 11:39)  POCT Blood Glucose.: 221 mg/dL (19 Apr 2020 07:46)                          10.3   10.47 )-----------( 157      ( 19 Apr 2020 06:38 )             29.3       Auto Immature Granulocyte %: 0.3 % (04-19-20 @ 06:38)  Auto Neutrophil %: 67.4 % (04-19-20 @ 06:38)    04-19    137  |  102  |  18  ----------------------------<  225<H>  3.7   |  24  |  1.4      Calcium, Total Serum: 8.4 mg/dL (04-19-20 @ 06:38)        RADIOLOGY & ADDITIONAL TESTS:  < from: Xray Chest 1 View- PORTABLE-Routine (04.18.20 @ 07:38) >  Impression:    Stable right perihilar opacity. No pleural effusions or pneumothorax    < end of copied text >

## 2020-04-20 NOTE — CHART NOTE - NSCHARTNOTEFT_GEN_A_CORE
Addendum to today's Progress Note    Febrile last night. Denies SOB, cough, chills    < from: Xray Chest 1 View- PORTABLE-Urgent (04.20.20 @ 02:38) >    No radiographic evidence of acute cardiopulmonary disease.    UA: negative    Plan:  - ambulate with PT  - incentive spirometry  - observe for fever  - Endo: elevated FS - increase insulin Lantus to 22u in HS, and Lispro 6 u TID with meals  - Anticipate for discharge tomorrow pending PT  - needs Rx rolling walker and 3-1 commode    SPECTRA [id number]

## 2020-04-21 ENCOUNTER — TRANSCRIPTION ENCOUNTER (OUTPATIENT)
Age: 85
End: 2020-04-21

## 2020-04-21 LAB
GLUCOSE BLDC GLUCOMTR-MCNC: 114 MG/DL — HIGH (ref 70–99)
GLUCOSE BLDC GLUCOMTR-MCNC: 133 MG/DL — HIGH (ref 70–99)
GLUCOSE BLDC GLUCOMTR-MCNC: 143 MG/DL — HIGH (ref 70–99)
GLUCOSE BLDC GLUCOMTR-MCNC: 168 MG/DL — HIGH (ref 70–99)

## 2020-04-21 RX ORDER — RIVAROXABAN 15 MG-20MG
1 KIT ORAL
Qty: 60 | Refills: 2
Start: 2020-04-21 | End: 2020-07-19

## 2020-04-21 RX ORDER — SITAGLIPTIN 50 MG/1
1 TABLET, FILM COATED ORAL
Qty: 0 | Refills: 0 | DISCHARGE
Start: 2020-04-21

## 2020-04-21 RX ORDER — INSULIN GLARGINE 100 [IU]/ML
22 INJECTION, SOLUTION SUBCUTANEOUS
Qty: 1000 | Refills: 3
Start: 2020-04-21 | End: 2020-08-18

## 2020-04-21 RX ORDER — ACETAMINOPHEN 500 MG
2 TABLET ORAL
Qty: 0 | Refills: 0 | DISCHARGE
Start: 2020-04-21

## 2020-04-21 RX ORDER — METFORMIN HYDROCHLORIDE 850 MG/1
0 TABLET ORAL
Qty: 180 | Refills: 0 | DISCHARGE

## 2020-04-21 RX ORDER — AMLODIPINE BESYLATE 2.5 MG/1
1 TABLET ORAL
Qty: 30 | Refills: 0
Start: 2020-04-21 | End: 2020-05-20

## 2020-04-21 RX ORDER — ASPIRIN/CALCIUM CARB/MAGNESIUM 324 MG
1 TABLET ORAL
Qty: 30 | Refills: 0
Start: 2020-04-21 | End: 2020-05-20

## 2020-04-21 RX ORDER — SITAGLIPTIN 50 MG/1
0 TABLET, FILM COATED ORAL
Qty: 90 | Refills: 0 | DISCHARGE

## 2020-04-21 RX ORDER — OXYCODONE HYDROCHLORIDE 5 MG/1
1 TABLET ORAL
Qty: 15 | Refills: 0
Start: 2020-04-21

## 2020-04-21 RX ORDER — INSULIN LISPRO 100/ML
6 VIAL (ML) SUBCUTANEOUS
Qty: 1000 | Refills: 3
Start: 2020-04-21

## 2020-04-21 RX ADMIN — TAMSULOSIN HYDROCHLORIDE 0.4 MILLIGRAM(S): 0.4 CAPSULE ORAL at 22:27

## 2020-04-21 RX ADMIN — Medication 2: at 17:16

## 2020-04-21 RX ADMIN — Medication 25 MILLIGRAM(S): at 17:07

## 2020-04-21 RX ADMIN — Medication 6 UNIT(S): at 11:46

## 2020-04-21 RX ADMIN — INSULIN GLARGINE 22 UNIT(S): 100 INJECTION, SOLUTION SUBCUTANEOUS at 22:24

## 2020-04-21 RX ADMIN — Medication 5 MILLIGRAM(S): at 22:23

## 2020-04-21 RX ADMIN — ATORVASTATIN CALCIUM 40 MILLIGRAM(S): 80 TABLET, FILM COATED ORAL at 22:23

## 2020-04-21 RX ADMIN — Medication 0: at 07:41

## 2020-04-21 RX ADMIN — Medication 25 MILLIGRAM(S): at 05:36

## 2020-04-21 RX ADMIN — RIVAROXABAN 10 MILLIGRAM(S): KIT at 11:46

## 2020-04-21 RX ADMIN — Medication 1 TABLET(S): at 11:46

## 2020-04-21 RX ADMIN — AMLODIPINE BESYLATE 5 MILLIGRAM(S): 2.5 TABLET ORAL at 05:36

## 2020-04-21 RX ADMIN — CILOSTAZOL 100 MILLIGRAM(S): 100 TABLET ORAL at 17:07

## 2020-04-21 RX ADMIN — SENNA PLUS 2 TABLET(S): 8.6 TABLET ORAL at 05:36

## 2020-04-21 RX ADMIN — Medication 6 UNIT(S): at 17:16

## 2020-04-21 RX ADMIN — OXYCODONE HYDROCHLORIDE 5 MILLIGRAM(S): 5 TABLET ORAL at 22:25

## 2020-04-21 RX ADMIN — Medication 6 UNIT(S): at 07:41

## 2020-04-21 RX ADMIN — Medication 0: at 11:46

## 2020-04-21 RX ADMIN — CILOSTAZOL 100 MILLIGRAM(S): 100 TABLET ORAL at 05:36

## 2020-04-21 RX ADMIN — LISINOPRIL 20 MILLIGRAM(S): 2.5 TABLET ORAL at 05:36

## 2020-04-21 RX ADMIN — Medication 81 MILLIGRAM(S): at 11:46

## 2020-04-21 NOTE — PROGRESS NOTE ADULT - SUBJECTIVE AND OBJECTIVE BOX
Procedure:s/p left cfa endarterectomy and fem-bk pop ptfe   Patient is a 86y old  Male who presents with a chief complaint of LLE Angiogram (2020 00:10)    PAST MEDICAL & SURGICAL HISTORY:      Events of the Last 24h:  Vital Signs Last 24 Hrs  T(C): 37.3 (2020 19:58), Max: 37.7 (2020 12:44)  T(F): 99.1 (2020 19:58), Max: 99.8 (2020 12:44)  HR: 89 (2020 19:58) (89 - 95)  BP: 121/62 (2020 19:58) (117/53 - 125/58)  BP(mean): --  RR: 18 (2020 19:58) (18 - 18)  SpO2: --        Diet, Consistent Carbohydrate Renal/No Snacks (20 @ 14:35)      I&O's Summary   I&O's Detail      MEDICATIONS  (STANDING):  amLODIPine   Tablet 5 milliGRAM(s) Oral daily  aspirin  chewable 81 milliGRAM(s) Oral daily  atorvastatin 40 milliGRAM(s) Oral at bedtime  ceFAZolin   IVPB 2000 milliGRAM(s) IV Intermittent every 8 hours  cilostazol 100 milliGRAM(s) Oral two times a day  dextrose 5%. 1000 milliLiter(s) (50 mL/Hr) IV Continuous <Continuous>  dextrose 50% Injectable 12.5 Gram(s) IV Push once  dextrose 50% Injectable 25 Gram(s) IV Push once  dextrose 50% Injectable 25 Gram(s) IV Push once  insulin glargine Injectable (LANTUS) 22 Unit(s) SubCutaneous at bedtime  insulin lispro (HumaLOG) corrective regimen sliding scale   SubCutaneous three times a day before meals  insulin lispro Injectable (HumaLOG) 6 Unit(s) SubCutaneous three times a day before meals  lisinopril 20 milliGRAM(s) Oral daily  melatonin 5 milliGRAM(s) Oral at bedtime  metoprolol tartrate 25 milliGRAM(s) Oral every 12 hours  multivitamin 1 Tablet(s) Oral daily  rivaroxaban 10 milliGRAM(s) Oral daily  tamsulosin 0.4 milliGRAM(s) Oral at bedtime    MEDICATIONS  (PRN):  acetaminophen   Tablet .. 650 milliGRAM(s) Oral every 6 hours PRN Temp greater or equal to 38C (100.4F)  dextrose 40% Gel 15 Gram(s) Oral once PRN Blood Glucose LESS THAN 70 milliGRAM(s)/deciliter  glucagon  Injectable 1 milliGRAM(s) IntraMuscular once PRN Glucose LESS THAN 70 milligrams/deciliter  morphine  - Injectable 2 milliGRAM(s) IV Push every 4 hours PRN Severe Pain (7 - 10)  ondansetron Injectable 4 milliGRAM(s) IV Push every 6 hours PRN Nausea  oxyCODONE    IR 5 milliGRAM(s) Oral every 6 hours PRN Moderate Pain (4 - 6)  senna 2 Tablet(s) Oral daily PRN Constipation      PHYSICAL EXAM:    GENERAL: NAD    HEENT: NCAT    CHEST/LUNGS: CTAB    HEART: RRR,  No murmurs, rubs, or gallops    ABDOMEN: SNTND +BS    EXTREMITIES:  FROM, No clubbing, cyanosis, or edema,dopplerable pulse dp at     NEURO: No focal neurological deficits    SKIN: No rashes or lesions    INCISION/WOUNDS:                          8.8    10.19 )-----------( 142      ( 2020 05:40 )             25.7        CBC Full  -  ( 2020 05:40 )  WBC Count : 10.19 K/uL  RBC Count : 2.84 M/uL  Hemoglobin : 8.8 g/dL  Hematocrit : 25.7 %  Platelet Count - Automated : 142 K/uL  Mean Cell Volume : 90.5 fL  Mean Cell Hemoglobin : 31.0 pg  Mean Cell Hemoglobin Concentration : 34.2 g/dL  Auto Neutrophil # : 6.52 K/uL  Auto Lymphocyte # : 2.69 K/uL  Auto Monocyte # : 0.85 K/uL  Auto Eosinophil # : 0.08 K/uL  Auto Basophil # : 0.02 K/uL  Auto Neutrophil % : 64.0 %  Auto Lymphocyte % : 26.4 %  Auto Monocyte % : 8.3 %  Auto Eosinophil % : 0.8 %  Auto Basophil % : 0.2 %               136   |  101   |  26                 Ca: 8.5    BMP:   ----------------------------< 212    M.0   (20 @ 11:25)             3.7    |  26    | 1.5                Ph: x        LFT:     TPro: 6.8 / Alb: 4.3 / TBili: 0.8 / DBili: x / AST: 20 / ALT: 14 / AlkPhos: 101   (04-15-20 @ 14:28)            Urinalysis Basic - ( 2020 13:23 )    Color: Yellow / Appearance: Clear / S.016 / pH: x  Gluc: x / Ketone: Negative  / Bili: Negative / Urobili: <2 mg/dL   Blood: x / Protein: 30 mg/dL / Nitrite: Negative   Leuk Esterase: Negative / RBC: 12 /HPF / WBC 2 /HPF   Sq Epi: x / Non Sq Epi: 1 /HPF / Bacteria: Negative        < from: Xray Chest 1 View- PORTABLE-Urgent (20 @ 02:38) >  Impression:      No radiographic evidence of acute cardiopulmonary disease.    < end of copied text >

## 2020-04-21 NOTE — DISCHARGE NOTE PROVIDER - NSDCMRMEDTOKEN_GEN_ALL_CORE_FT
acetaminophen 325 mg oral tablet: 2 tab(s) orally every 6 hours, As needed, Temp greater or equal to 38C (100.4F)  amLODIPine 5 mg oral tablet: 1 tab(s) orally once a day  aspirin 81 mg oral tablet, chewable: 1 tab(s) orally once a day  benazepril 20 mg tablet:   blood glucose test strips: 1 application intradermal 3 times a day   cilostazol 100 mg tablet:   ezetimibe 10 mg tablet:   glucometer: 1 applicator intradermal 3 times a day   insulin glargine 100 units/mL subcutaneous solution: 22 unit(s) subcutaneous once a day (at bedtime)   insulin lispro 100 units/mL injectable solution: 6 unit(s) injectable 3 times a day (before meals)   lancets: 1 application by injection into the soft tissue 3 times a day , 30 gauge  metoprolol tartrate 25 mg tablet:   Multiple Vitamins oral tablet: 1 tab(s) orally once a day  oxyCODONE 5 mg oral tablet: 1 tab(s) orally every 6 hours, As needed, Moderate Pain (4 - 6) MDD:4  rivaroxaban 2.5 mg oral tablet: 1 tab(s) orally 2 times a day  simvastatin 40 mg tablet:   SITagliptin 100 mg oral tablet: 1 tab(s) orally once a day  tamsulosin 0.4 mg capsule:

## 2020-04-21 NOTE — DISCHARGE NOTE PROVIDER - NSDCACTIVITY_GEN_ALL_CORE
Do not drive or operate machinery/No heavy lifting/straining/Walking - Outdoors allowed/Stairs allowed/Walking - Indoors allowed/Showering allowed

## 2020-04-21 NOTE — DISCHARGE NOTE PROVIDER - CARE PROVIDER_API CALL
Eduardo Herring)  Surgery; Vascular Surgery  [address] [place] [address]  Phone: [phone]  Fax: [phone]  Follow Up Time: 2 weeks

## 2020-04-21 NOTE — DISCHARGE NOTE PROVIDER - NSDCFUADDINST_GEN_ALL_CORE_FT
May shower, and wet the wound. Pat dry and keep incision open to air  Go to ED with fevers, chills, drainage from the incision site, redness and swelling May shower, and wet the wound on Friday. Pat dry and keep incision open to air  Go to ED with fevers, chills, drainage from the incision site, redness and swelling

## 2020-04-22 ENCOUNTER — TRANSCRIPTION ENCOUNTER (OUTPATIENT)
Age: 85
End: 2020-04-22

## 2020-04-22 VITALS
TEMPERATURE: 98 F | SYSTOLIC BLOOD PRESSURE: 105 MMHG | HEART RATE: 86 BPM | RESPIRATION RATE: 18 BRPM | DIASTOLIC BLOOD PRESSURE: 50 MMHG

## 2020-04-22 LAB
GLUCOSE BLDC GLUCOMTR-MCNC: 102 MG/DL — HIGH (ref 70–99)
GLUCOSE BLDC GLUCOMTR-MCNC: 169 MG/DL — HIGH (ref 70–99)
SURGICAL PATHOLOGY STUDY: SIGNIFICANT CHANGE UP

## 2020-04-22 RX ORDER — OXYCODONE AND ACETAMINOPHEN 5; 325 MG/1; MG/1
1 TABLET ORAL
Qty: 15 | Refills: 0
Start: 2020-04-22

## 2020-04-22 RX ADMIN — Medication 1 TABLET(S): at 11:29

## 2020-04-22 RX ADMIN — Medication 25 MILLIGRAM(S): at 05:40

## 2020-04-22 RX ADMIN — CILOSTAZOL 100 MILLIGRAM(S): 100 TABLET ORAL at 05:40

## 2020-04-22 RX ADMIN — LISINOPRIL 20 MILLIGRAM(S): 2.5 TABLET ORAL at 05:40

## 2020-04-22 RX ADMIN — Medication 6 UNIT(S): at 08:13

## 2020-04-22 RX ADMIN — Medication 2: at 11:30

## 2020-04-22 RX ADMIN — Medication 81 MILLIGRAM(S): at 11:29

## 2020-04-22 RX ADMIN — Medication 6 UNIT(S): at 11:29

## 2020-04-22 RX ADMIN — AMLODIPINE BESYLATE 5 MILLIGRAM(S): 2.5 TABLET ORAL at 05:40

## 2020-04-22 RX ADMIN — RIVAROXABAN 10 MILLIGRAM(S): KIT at 12:03

## 2020-04-22 NOTE — PROGRESS NOTE ADULT - REASON FOR ADMISSION
LLE Angiogram

## 2020-04-22 NOTE — PROGRESS NOTE ADULT - SUBJECTIVE AND OBJECTIVE BOX
MARCELA TOWNSEND  86y Male   [id number]    Procedure:s/p left cfa endarterectomy and fem-bk pop ptfe   Patient is a 86y old  Male who presents with a chief complaint of LLE Angiogram (2020 12:43)    PAST MEDICAL & SURGICAL HISTORY:      Events of the Last 24h:  Vital Signs Last 24 Hrs  T(C): 36.2 (2020 19:33), Max: 37 (2020 12:39)  T(F): 97.1 (2020 19:33), Max: 98.6 (2020 12:39)  HR: 87 (2020 19:33) (81 - 87)  BP: 132/64 (2020 19:33) (105/54 - 132/64)  BP(mean): --  RR: 16 (2020 19:33) (16 - 18)  SpO2: --        Diet, Consistent Carbohydrate Renal/No Snacks (20 @ 14:35)      I&O's Summary   I&O's Detail      MEDICATIONS  (STANDING):  amLODIPine   Tablet 5 milliGRAM(s) Oral daily  aspirin  chewable 81 milliGRAM(s) Oral daily  atorvastatin 40 milliGRAM(s) Oral at bedtime  cilostazol 100 milliGRAM(s) Oral two times a day  dextrose 5%. 1000 milliLiter(s) (50 mL/Hr) IV Continuous <Continuous>  dextrose 50% Injectable 12.5 Gram(s) IV Push once  dextrose 50% Injectable 25 Gram(s) IV Push once  dextrose 50% Injectable 25 Gram(s) IV Push once  insulin glargine Injectable (LANTUS) 22 Unit(s) SubCutaneous at bedtime  insulin lispro (HumaLOG) corrective regimen sliding scale   SubCutaneous three times a day before meals  insulin lispro Injectable (HumaLOG) 6 Unit(s) SubCutaneous three times a day before meals  lisinopril 20 milliGRAM(s) Oral daily  melatonin 5 milliGRAM(s) Oral at bedtime  metoprolol tartrate 25 milliGRAM(s) Oral every 12 hours  multivitamin 1 Tablet(s) Oral daily  rivaroxaban 10 milliGRAM(s) Oral daily  tamsulosin 0.4 milliGRAM(s) Oral at bedtime    MEDICATIONS  (PRN):  acetaminophen   Tablet .. 650 milliGRAM(s) Oral every 6 hours PRN Temp greater or equal to 38C (100.4F)  dextrose 40% Gel 15 Gram(s) Oral once PRN Blood Glucose LESS THAN 70 milliGRAM(s)/deciliter  glucagon  Injectable 1 milliGRAM(s) IntraMuscular once PRN Glucose LESS THAN 70 milligrams/deciliter  morphine  - Injectable 2 milliGRAM(s) IV Push every 4 hours PRN Severe Pain (7 - 10)  ondansetron Injectable 4 milliGRAM(s) IV Push every 6 hours PRN Nausea  oxyCODONE    IR 5 milliGRAM(s) Oral every 6 hours PRN Moderate Pain (4 - 6)  senna 2 Tablet(s) Oral daily PRN Constipation      PHYSICAL EXAM:    GENERAL: NAD    HEENT: NCAT    CHEST/LUNGS: CTAB    HEART: RRR,  No murmurs, rubs, or gallops    ABDOMEN: SNTND +BS    EXTREMITIES:  FROM, No clubbing, cyanosis, or edema, palpable pulse    NEURO: No focal neurological deficits    SKIN: No rashes or lesions    INCISION/WOUNDS:clean dry intact                           8.8    10.19 )-----------( 142      ( 2020 05:40 )             25.7        CBC Full  -  ( 2020 05:40 )  WBC Count : 10.19 K/uL  RBC Count : 2.84 M/uL  Hemoglobin : 8.8 g/dL  Hematocrit : 25.7 %  Platelet Count - Automated : 142 K/uL  Mean Cell Volume : 90.5 fL  Mean Cell Hemoglobin : 31.0 pg  Mean Cell Hemoglobin Concentration : 34.2 g/dL  Auto Neutrophil # : 6.52 K/uL  Auto Lymphocyte # : 2.69 K/uL  Auto Monocyte # : 0.85 K/uL  Auto Eosinophil # : 0.08 K/uL  Auto Basophil # : 0.02 K/uL  Auto Neutrophil % : 64.0 %  Auto Lymphocyte % : 26.4 %  Auto Monocyte % : 8.3 %  Auto Eosinophil % : 0.8 %  Auto Basophil % : 0.2 %               136   |  101   |  26                 Ca: 8.5    BMP:   ----------------------------< 212    M.0   (20 @ 11:25)             3.7    |  26    | 1.5                Ph: x        LFT:     TPro: 6.8 / Alb: 4.3 / TBili: 0.8 / DBili: x / AST: 20 / ALT: 14 / AlkPhos: 101   (04-15-20 @ 14:28)            Urinalysis Basic - ( 2020 13:23 )    Color: Yellow / Appearance: Clear / S.016 / pH: x  Gluc: x / Ketone: Negative  / Bili: Negative / Urobili: <2 mg/dL   Blood: x / Protein: 30 mg/dL / Nitrite: Negative   Leuk Esterase: Negative / RBC: 12 /HPF / WBC 2 /HPF   Sq Epi: x / Non Sq Epi: 1 /HPF / Bacteria: Negative

## 2020-04-22 NOTE — DISCHARGE NOTE NURSING/CASE MANAGEMENT/SOCIAL WORK - PATIENT PORTAL LINK FT
You can access the FollowMyHealth Patient Portal offered by [facility] by registering at the following website: [email or web address]. By joining [facility]’s FollowMyHealth portal, you will also be able to view your health information using other applications (apps) compatible with our system.

## 2020-04-24 DIAGNOSIS — D64.9 ANEMIA, UNSPECIFIED: ICD-10-CM

## 2020-04-24 DIAGNOSIS — I34.0 NONRHEUMATIC MITRAL (VALVE) INSUFFICIENCY: ICD-10-CM

## 2020-04-24 DIAGNOSIS — E78.5 HYPERLIPIDEMIA, UNSPECIFIED: ICD-10-CM

## 2020-04-24 DIAGNOSIS — Z95.1 PRESENCE OF AORTOCORONARY BYPASS GRAFT: ICD-10-CM

## 2020-04-24 DIAGNOSIS — I70.92 CHRONIC TOTAL OCCLUSION OF ARTERY OF THE EXTREMITIES: ICD-10-CM

## 2020-04-24 DIAGNOSIS — R31.9 HEMATURIA, UNSPECIFIED: ICD-10-CM

## 2020-04-24 DIAGNOSIS — I25.10 ATHEROSCLEROTIC HEART DISEASE OF NATIVE CORONARY ARTERY WITHOUT ANGINA PECTORIS: ICD-10-CM

## 2020-04-24 DIAGNOSIS — R50.82 POSTPROCEDURAL FEVER: ICD-10-CM

## 2020-04-24 DIAGNOSIS — Z87.442 PERSONAL HISTORY OF URINARY CALCULI: ICD-10-CM

## 2020-04-24 DIAGNOSIS — E11.51 TYPE 2 DIABETES MELLITUS WITH DIABETIC PERIPHERAL ANGIOPATHY WITHOUT GANGRENE: ICD-10-CM

## 2020-04-24 DIAGNOSIS — I70.0 ATHEROSCLEROSIS OF AORTA: ICD-10-CM

## 2020-04-24 DIAGNOSIS — I44.7 LEFT BUNDLE-BRANCH BLOCK, UNSPECIFIED: ICD-10-CM

## 2020-04-24 DIAGNOSIS — N40.0 BENIGN PROSTATIC HYPERPLASIA WITHOUT LOWER URINARY TRACT SYMPTOMS: ICD-10-CM

## 2020-04-24 DIAGNOSIS — I10 ESSENTIAL (PRIMARY) HYPERTENSION: ICD-10-CM

## 2020-04-24 DIAGNOSIS — E03.9 HYPOTHYROIDISM, UNSPECIFIED: ICD-10-CM

## 2020-04-24 DIAGNOSIS — I70.222 ATHEROSCLEROSIS OF NATIVE ARTERIES OF EXTREMITIES WITH REST PAIN, LEFT LEG: ICD-10-CM

## 2020-04-24 DIAGNOSIS — Z79.82 LONG TERM (CURRENT) USE OF ASPIRIN: ICD-10-CM

## 2020-04-24 DIAGNOSIS — Z79.84 LONG TERM (CURRENT) USE OF ORAL HYPOGLYCEMIC DRUGS: ICD-10-CM

## 2020-04-28 RX ORDER — OXYCODONE AND ACETAMINOPHEN 5; 325 MG/1; MG/1
1 TABLET ORAL
Qty: 15 | Refills: 0
Start: 2020-04-28

## 2020-05-01 RX ORDER — INSULIN GLARGINE 100 [IU]/ML
22 INJECTION, SOLUTION SUBCUTANEOUS
Qty: 660 | Refills: 0
Start: 2020-05-01 | End: 2020-05-30

## 2020-05-05 ENCOUNTER — APPOINTMENT (OUTPATIENT)
Dept: VASCULAR SURGERY | Facility: CLINIC | Age: 85
End: 2020-05-05
Payer: MEDICARE

## 2020-05-05 PROCEDURE — 99024 POSTOP FOLLOW-UP VISIT: CPT

## 2020-05-05 NOTE — REASON FOR VISIT
[de-identified] : S/P L CFA endarterectomy and fem -BK pop with PTFE [Family Member] : family member

## 2020-05-14 RX ORDER — OXYCODONE AND ACETAMINOPHEN 5; 325 MG/1; MG/1
1 TABLET ORAL
Qty: 15 | Refills: 0
Start: 2020-05-14

## 2020-05-19 ENCOUNTER — APPOINTMENT (OUTPATIENT)
Dept: VASCULAR SURGERY | Facility: CLINIC | Age: 85
End: 2020-05-19
Payer: MEDICARE

## 2020-05-19 PROCEDURE — 99024 POSTOP FOLLOW-UP VISIT: CPT

## 2020-05-19 PROCEDURE — 93926 LOWER EXTREMITY STUDY: CPT

## 2020-06-04 ENCOUNTER — APPOINTMENT (OUTPATIENT)
Dept: VASCULAR SURGERY | Facility: CLINIC | Age: 85
End: 2020-06-04
Payer: MEDICARE

## 2020-06-04 DIAGNOSIS — I70.502 UNSPECIFIED ATHEROSCLEROSIS OF NONAUTOLOGOUS BIOLOGICAL BYPASS GRAFT(S) OF THE EXTREMITIES, LEFT LEG: ICD-10-CM

## 2020-06-04 PROCEDURE — 99024 POSTOP FOLLOW-UP VISIT: CPT

## 2020-06-04 PROCEDURE — 93926 LOWER EXTREMITY STUDY: CPT

## 2020-06-15 ENCOUNTER — RX RENEWAL (OUTPATIENT)
Age: 85
End: 2020-06-15

## 2020-06-19 ENCOUNTER — APPOINTMENT (OUTPATIENT)
Dept: VASCULAR SURGERY | Facility: CLINIC | Age: 85
End: 2020-06-19

## 2020-07-09 ENCOUNTER — APPOINTMENT (OUTPATIENT)
Dept: VASCULAR SURGERY | Facility: CLINIC | Age: 85
End: 2020-07-09
Payer: MEDICARE

## 2020-07-09 PROCEDURE — 99024 POSTOP FOLLOW-UP VISIT: CPT

## 2020-07-09 RX ORDER — CILOSTAZOL 100 MG/1
100 TABLET ORAL TWICE DAILY
Qty: 60 | Refills: 6 | Status: DISCONTINUED | COMMUNITY
Start: 2019-09-24 | End: 2020-07-09

## 2020-07-09 RX ORDER — MUPIROCIN 20 MG/G
2 OINTMENT TOPICAL TWICE DAILY
Qty: 1 | Refills: 3 | Status: DISCONTINUED | COMMUNITY
Start: 2020-06-04 | End: 2020-07-09

## 2020-09-02 ENCOUNTER — OUTPATIENT (OUTPATIENT)
Dept: OUTPATIENT SERVICES | Facility: HOSPITAL | Age: 85
LOS: 1 days | Discharge: HOME | End: 2020-09-02
Payer: MEDICARE

## 2020-09-02 DIAGNOSIS — N20.2 CALCULUS OF KIDNEY WITH CALCULUS OF URETER: ICD-10-CM

## 2020-09-02 PROCEDURE — 76770 US EXAM ABDO BACK WALL COMP: CPT | Mod: 26

## 2020-09-08 DIAGNOSIS — I70.222 ATHEROSCLEROSIS OF NATIVE ARTERIES OF EXTREMITIES WITH REST PAIN, LEFT LEG: ICD-10-CM

## 2020-09-10 ENCOUNTER — APPOINTMENT (OUTPATIENT)
Dept: VASCULAR SURGERY | Facility: CLINIC | Age: 85
End: 2020-09-10

## 2020-10-11 NOTE — DISCHARGE NOTE PROVIDER - HOSPITAL COURSE
86M w/PMHx of DM, HTN, HLD, CAD, CABG, hypothyroidism, anemia, BPH, nephrolithiasis who was sent in from Dr. Herring's outpatient office for left leg pain at rest with known iliac/CFA stenosis found on prior CTA (no treatment/intervention). He was sent in for LLE angiogram on 4/16 for evaluation of LLE arterial disease. (15 Apr 2020 15:25)     86y male  with multiple risk factors and medical issues (see above), presented with left lower extremity rest pain, arterial duplex in ED revealed CFA severe disease. Pt underwent cardiac evaluation prior an angiogram and bypass surgery. Cards: Dr. Rice, Intermediate risk on the basis of patient history and age, recommended to add Amlodipine to home medications for better HTN control.     On 4/17 pt underwent left lower extremity angiogram revealing CFA and popliteal stenosis. Case proceeded with CFA endarterectomy and femoral to below knee popliteal artery bypass using PTFE.    Post op, pt was successfully extubated and maintained on non-rebreather mask. Alert and oriented, conversive.  + hematuria noted. /90 at the end of the case    Started on low dose Xarelto for bypass patency.    Diamond removed at midnight. UA negative. Flomax resumed. Pt received all his anti-HTN medications. Evaluated by Endocrinology for elevated blood sugars. Metformin discontinued, started on Lantus and Lispro along with Januvia. Ambulated with Physical therapy on 4/19, 4/20 and 4/21 20

## 2020-10-27 ENCOUNTER — OUTPATIENT (OUTPATIENT)
Dept: OUTPATIENT SERVICES | Facility: HOSPITAL | Age: 85
LOS: 1 days | Discharge: HOME | End: 2020-10-27
Payer: MEDICARE

## 2020-10-27 DIAGNOSIS — R07.9 CHEST PAIN, UNSPECIFIED: ICD-10-CM

## 2020-10-27 PROCEDURE — 71046 X-RAY EXAM CHEST 2 VIEWS: CPT | Mod: 26

## 2020-11-20 NOTE — DISCUSSION/SUMMARY
Pt with a complex cyst of R kidney noted.   It is small but may need to have a biopsy since it is not a simple cyst.   May need further evaluation and would have pt see Dr. Weston regarding this. Will get referral approval for this.  .  Spoke to and notified pt of results and orders. Patient understands. Report faxed to Dr. Weston at Fax number [phone].   [FreeTextEntry1] : Pt is 2 weeks S/P left fem-pop BYP with left CFA endarterectomy.  Doing well.  Incisions clean and dry.  Ambulating with walker.  Moderate swelling,\par Instructed to shower q daily\par RTC in 2 weeks for 1st postop duplex

## 2020-12-01 NOTE — PRE-ANESTHESIA EVALUATION ADULT - NSPROPOSEDPROCEDFT_GEN_ALL_CORE
Left LE angiogram Subjective:      Patient ID: Steven Thompson Jr. is a 32 y.o. male.    Chief Complaint: Leg Pain (saw you for foot pain, referred to Dr Adam, he saw him and ordered insoles for his shoes, he has had them but now the backs of his legs and back hurt )      Patient rate reports he had seen Dr. Adam for his feet, has new orthotics.  However he finds that now that he is walking differently is having significant pain and lower back and bilateral legs.  This is worsened when he has to go to work, requesting a couple weeks off.  He reports having tried seeing a chiropractor as well with no relief.    Review of Systems   Musculoskeletal: Positive for arthralgias, back pain and gait problem.     History reviewed. No pertinent past medical history.       History reviewed. No pertinent surgical history.  History reviewed. No pertinent family history.  Social History     Socioeconomic History    Marital status: Single     Spouse name: Not on file    Number of children: Not on file    Years of education: Not on file    Highest education level: Not on file   Occupational History    Not on file   Social Needs    Financial resource strain: Not on file    Food insecurity     Worry: Not on file     Inability: Not on file    Transportation needs     Medical: Not on file     Non-medical: Not on file   Tobacco Use    Smoking status: Current Every Day Smoker     Types: Cigarettes    Smokeless tobacco: Never Used   Substance and Sexual Activity    Alcohol use: Not Currently    Drug use: Not Currently    Sexual activity: Yes     Partners: Female   Lifestyle    Physical activity     Days per week: Not on file     Minutes per session: Not on file    Stress: Only a little   Relationships    Social connections     Talks on phone: Not on file     Gets together: Not on file     Attends Voodoo service: Not on file     Active member of club or organization: Not on file     Attends meetings of clubs or organizations: Not  "on file     Relationship status: Not on file   Other Topics Concern    Not on file   Social History Narrative    Not on file     Review of patient's allergies indicates:  No Known Allergies    Objective:       /82   Pulse 78   Temp 98.4 °F (36.9 °C) (Temporal)   Ht 5' 9" (1.753 m)   Wt 82.4 kg (181 lb 10.5 oz)   SpO2 98%   BMI 26.83 kg/m²   Physical Exam  Constitutional:       General: He is not in acute distress.     Appearance: Normal appearance. He is well-developed. He is not ill-appearing or diaphoretic.   Cardiovascular:      Rate and Rhythm: Normal rate and regular rhythm.      Heart sounds: Normal heart sounds.   Musculoskeletal: Normal range of motion.         General: Tenderness (lower lumbar) present. No swelling, deformity or signs of injury.      Right lower leg: No edema.      Left lower leg: No edema.   Neurological:      General: No focal deficit present.      Mental Status: He is alert and oriented to person, place, and time.   Psychiatric:         Mood and Affect: Mood normal.         Behavior: Behavior normal.         Thought Content: Thought content normal.         Judgment: Judgment normal.       Assessment:     1. Acute low back pain, unspecified back pain laterality, unspecified whether sciatica present    2. Bilateral leg pain      Plan:   Acute low back pain, unspecified back pain laterality, unspecified whether sciatica present  -     Ambulatory referral/consult to Physical/Occupational Therapy; Future; Expected date: 12/01/2020    Bilateral leg pain  -     Ambulatory referral/consult to Physical/Occupational Therapy; Future; Expected date: 12/01/2020    Other orders  -     cyclobenzaprine (FLEXERIL) 10 MG tablet; Take 1 tablet (10 mg total) by mouth every evening. for 10 days  Dispense: 30 tablet; Refill: 1      Medication List with Changes/Refills   New Medications    CYCLOBENZAPRINE (FLEXERIL) 10 MG TABLET    Take 1 tablet (10 mg total) by mouth every evening. for 10 days "   Current Medications    MELOXICAM (MOBIC) 15 MG TABLET    Take 1 tablet (15 mg total) by mouth once daily.   Discontinued Medications    ESCITALOPRAM OXALATE (LEXAPRO) 10 MG TABLET    Take 1 tablet (10 mg total) by mouth once daily.

## 2021-04-29 ENCOUNTER — APPOINTMENT (OUTPATIENT)
Dept: VASCULAR SURGERY | Facility: CLINIC | Age: 86
End: 2021-04-29

## 2022-11-28 NOTE — PHYSICAL THERAPY INITIAL EVALUATION ADULT - RANGE OF MOTION EXAMINATION, REHAB EVAL
[facility] ambulatory encounter  ENT POSTOP VISIT      SUBJECTIVE:    Antonia Guzman is a 49 year old male who presents for postop visit for his nose.  The patient is 11 days status post septoplasty and SMR of inferior turbinates.  The patient is using saline nasal spray and antibiotic ointment as directed.  He has a expected stuffiness of the nose, but no bleeding and no excessive pain.      PAST HISTORIES:    I have reviewed the past medical history, family history, social history, medications and allergies listed in the medical record as obtained by my nursing staff and support staff and agree with their documentation.    OBJECTIVE:    Physical Examination:    Anterior rhinoscopy reveals the septal splints to be in place.  The retention suture was cut and removed, and the splints were removed without difficulty.  The septum is straight.  No evidence of complications.  Nasal cavities are patent bilaterally.    Assessment:    Patient is doing well status post septoplasty and SMR of inferior turbinates.    PLAN:    Continue frequent use of saline nasal spray/irrigation.    Continue antibiotic ointment to sutures near front of nasal septum on the left side, b.i.d., x2 weeks.    Return visit in a month to 6 weeks.    No orders of the defined types were placed in this encounter.      Return in about 1 month (around 12/28/2022).    Instructions provided as documented in the After Visit Summary.    The patient indicated understanding of the diagnosis and agreed with the plan of care.          L LE unable to move due to severe pain/Right LE ROM was WFL (within functional limits)

## 2023-01-17 ENCOUNTER — OUTPATIENT (OUTPATIENT)
Dept: OUTPATIENT SERVICES | Facility: HOSPITAL | Age: 88
LOS: 1 days | Discharge: HOME | End: 2023-01-17
Payer: MEDICARE

## 2023-01-17 DIAGNOSIS — R07.9 CHEST PAIN, UNSPECIFIED: ICD-10-CM

## 2023-01-17 PROCEDURE — 71046 X-RAY EXAM CHEST 2 VIEWS: CPT | Mod: 26

## 2023-12-12 NOTE — PRE-OP CHECKLIST - SIDE RAILS UP
PROCEDURE DATE: 12/12/2023    PROCEDURE:  Radiofrequency ablation of the L3, L4, and L5 medial branch nerves on the left-side utilizing fluoroscopy    DIAGNOSIS:  Other lumbar spondylosis  Post op Diagnosis: Same    PHYSICIAN: Allen Benjamin MD    MEDICATIONS INJECTED:  From a mixture of 2 ml of 0.25% bupivicaine and 10 mg of dexamethasone,  1ml of this solution was injected at each level.    LOCAL ANESTHETIC USED: Lidocaine 1%, 2 ml given at each site.    SEDATION MEDICATIONS: RN IV sedation    ESTIMATED BLOOD LOSS:  none    COMPLICATIONS:  none    TECHNIQUE:  A time out was taken to identify patient and procedure side prior to starting the procedure. Laying in a prone position, the patient was prepped and draped in the usual sterile fashion using ChloraPrep and sterile towels.  The levels were determined under fluoroscopic guidance and then marked.  Local anesthetic was given by raising a wheal at the skin over each site and then infiltrated approximately 2cm deeper.  A 20-gauge  100 mm RF needle was introduced to the anatomic location of the left L3, L4, and L5 medial branch nerves. Motor stimulation up to 2 Volts at each level confirmed no motor nerve involvement.  Impedance was less than 800 ohms at each level.  1ml of 2% lidocaine was instilled prior to lesioning.  Ablation was performed per level utilizing radiofrequency generator 80°C for 105 seconds.  Needles were then rotated 90° and a second lesion was performed.  The above noted medication was then injected slowly. The patient tolerated the procedure well.     The patient was monitored after the procedure.  Patient was given post procedure and discharge instructions to follow at home.  The patient was discharged in a stable condition   
done

## 2023-12-28 ENCOUNTER — INPATIENT (INPATIENT)
Facility: HOSPITAL | Age: 88
LOS: 11 days | Discharge: NURSING, REHAB OR LONG-TERM CARE | DRG: 276 | End: 2024-01-09
Attending: STUDENT IN AN ORGANIZED HEALTH CARE EDUCATION/TRAINING PROGRAM | Admitting: INTERNAL MEDICINE
Payer: MEDICARE

## 2023-12-28 VITALS
RESPIRATION RATE: 17 BRPM | DIASTOLIC BLOOD PRESSURE: 64 MMHG | HEART RATE: 31 BPM | SYSTOLIC BLOOD PRESSURE: 123 MMHG | OXYGEN SATURATION: 97 % | TEMPERATURE: 97 F

## 2023-12-28 DIAGNOSIS — I45.5 OTHER SPECIFIED HEART BLOCK: ICD-10-CM

## 2023-12-28 LAB
ALBUMIN SERPL ELPH-MCNC: 4.1 G/DL — SIGNIFICANT CHANGE UP (ref 3.5–5.2)
ALP SERPL-CCNC: 163 U/L — HIGH (ref 30–115)
ALP SERPL-CCNC: 163 U/L — HIGH (ref 30–115)
ALP SERPL-CCNC: 212 U/L — HIGH (ref 30–115)
ALP SERPL-CCNC: 212 U/L — HIGH (ref 30–115)
ALT FLD-CCNC: 21 U/L — SIGNIFICANT CHANGE UP (ref 0–41)
ALT FLD-CCNC: 21 U/L — SIGNIFICANT CHANGE UP (ref 0–41)
ALT FLD-CCNC: 72 U/L — HIGH (ref 0–41)
ALT FLD-CCNC: 72 U/L — HIGH (ref 0–41)
ANION GAP SERPL CALC-SCNC: 10 MMOL/L — SIGNIFICANT CHANGE UP (ref 7–14)
ANION GAP SERPL CALC-SCNC: 10 MMOL/L — SIGNIFICANT CHANGE UP (ref 7–14)
ANION GAP SERPL CALC-SCNC: 12 MMOL/L — SIGNIFICANT CHANGE UP (ref 7–14)
ANION GAP SERPL CALC-SCNC: 12 MMOL/L — SIGNIFICANT CHANGE UP (ref 7–14)
APTT BLD: 25.5 SEC — LOW (ref 27–39.2)
APTT BLD: 25.5 SEC — LOW (ref 27–39.2)
AST SERPL-CCNC: 145 U/L — HIGH (ref 0–41)
AST SERPL-CCNC: 145 U/L — HIGH (ref 0–41)
AST SERPL-CCNC: 54 U/L — HIGH (ref 0–41)
AST SERPL-CCNC: 54 U/L — HIGH (ref 0–41)
BASE EXCESS BLDV CALC-SCNC: -4.1 MMOL/L — LOW (ref -2–3)
BASE EXCESS BLDV CALC-SCNC: -4.1 MMOL/L — LOW (ref -2–3)
BASOPHILS # BLD AUTO: 0.04 K/UL — SIGNIFICANT CHANGE UP (ref 0–0.2)
BASOPHILS # BLD AUTO: 0.04 K/UL — SIGNIFICANT CHANGE UP (ref 0–0.2)
BASOPHILS NFR BLD AUTO: 0.5 % — SIGNIFICANT CHANGE UP (ref 0–1)
BASOPHILS NFR BLD AUTO: 0.5 % — SIGNIFICANT CHANGE UP (ref 0–1)
BILIRUB SERPL-MCNC: 1.1 MG/DL — SIGNIFICANT CHANGE UP (ref 0.2–1.2)
BUN SERPL-MCNC: 31 MG/DL — HIGH (ref 10–20)
BUN SERPL-MCNC: 31 MG/DL — HIGH (ref 10–20)
BUN SERPL-MCNC: 33 MG/DL — HIGH (ref 10–20)
BUN SERPL-MCNC: 33 MG/DL — HIGH (ref 10–20)
CA-I SERPL-SCNC: 1.3 MMOL/L — SIGNIFICANT CHANGE UP (ref 1.15–1.33)
CA-I SERPL-SCNC: 1.3 MMOL/L — SIGNIFICANT CHANGE UP (ref 1.15–1.33)
CALCIUM SERPL-MCNC: 9.2 MG/DL — SIGNIFICANT CHANGE UP (ref 8.4–10.5)
CALCIUM SERPL-MCNC: 9.2 MG/DL — SIGNIFICANT CHANGE UP (ref 8.4–10.5)
CALCIUM SERPL-MCNC: 9.4 MG/DL — SIGNIFICANT CHANGE UP (ref 8.4–10.5)
CALCIUM SERPL-MCNC: 9.4 MG/DL — SIGNIFICANT CHANGE UP (ref 8.4–10.5)
CHLORIDE SERPL-SCNC: 104 MMOL/L — SIGNIFICANT CHANGE UP (ref 98–110)
CHLORIDE SERPL-SCNC: 104 MMOL/L — SIGNIFICANT CHANGE UP (ref 98–110)
CHLORIDE SERPL-SCNC: 107 MMOL/L — SIGNIFICANT CHANGE UP (ref 98–110)
CHLORIDE SERPL-SCNC: 107 MMOL/L — SIGNIFICANT CHANGE UP (ref 98–110)
CO2 SERPL-SCNC: 19 MMOL/L — SIGNIFICANT CHANGE UP (ref 17–32)
CO2 SERPL-SCNC: 19 MMOL/L — SIGNIFICANT CHANGE UP (ref 17–32)
CO2 SERPL-SCNC: 21 MMOL/L — SIGNIFICANT CHANGE UP (ref 17–32)
CO2 SERPL-SCNC: 21 MMOL/L — SIGNIFICANT CHANGE UP (ref 17–32)
CREAT SERPL-MCNC: 1.4 MG/DL — SIGNIFICANT CHANGE UP (ref 0.7–1.5)
CREAT SERPL-MCNC: 1.4 MG/DL — SIGNIFICANT CHANGE UP (ref 0.7–1.5)
CREAT SERPL-MCNC: 1.5 MG/DL — SIGNIFICANT CHANGE UP (ref 0.7–1.5)
CREAT SERPL-MCNC: 1.5 MG/DL — SIGNIFICANT CHANGE UP (ref 0.7–1.5)
EGFR: 44 ML/MIN/1.73M2 — LOW
EGFR: 44 ML/MIN/1.73M2 — LOW
EGFR: 48 ML/MIN/1.73M2 — LOW
EGFR: 48 ML/MIN/1.73M2 — LOW
EOSINOPHIL # BLD AUTO: 0.27 K/UL — SIGNIFICANT CHANGE UP (ref 0–0.7)
EOSINOPHIL # BLD AUTO: 0.27 K/UL — SIGNIFICANT CHANGE UP (ref 0–0.7)
EOSINOPHIL NFR BLD AUTO: 3.1 % — SIGNIFICANT CHANGE UP (ref 0–8)
EOSINOPHIL NFR BLD AUTO: 3.1 % — SIGNIFICANT CHANGE UP (ref 0–8)
GAS PNL BLDV: 135 MMOL/L — LOW (ref 136–145)
GAS PNL BLDV: 135 MMOL/L — LOW (ref 136–145)
GAS PNL BLDV: SIGNIFICANT CHANGE UP
GAS PNL BLDV: SIGNIFICANT CHANGE UP
GLUCOSE BLDC GLUCOMTR-MCNC: 205 MG/DL — HIGH (ref 70–99)
GLUCOSE BLDC GLUCOMTR-MCNC: 205 MG/DL — HIGH (ref 70–99)
GLUCOSE BLDC GLUCOMTR-MCNC: 241 MG/DL — HIGH (ref 70–99)
GLUCOSE BLDC GLUCOMTR-MCNC: 241 MG/DL — HIGH (ref 70–99)
GLUCOSE SERPL-MCNC: 213 MG/DL — HIGH (ref 70–99)
GLUCOSE SERPL-MCNC: 213 MG/DL — HIGH (ref 70–99)
GLUCOSE SERPL-MCNC: 239 MG/DL — HIGH (ref 70–99)
GLUCOSE SERPL-MCNC: 239 MG/DL — HIGH (ref 70–99)
HCO3 BLDV-SCNC: 24 MMOL/L — SIGNIFICANT CHANGE UP (ref 22–29)
HCO3 BLDV-SCNC: 24 MMOL/L — SIGNIFICANT CHANGE UP (ref 22–29)
HCT VFR BLD CALC: 39 % — LOW (ref 42–52)
HCT VFR BLD CALC: 39 % — LOW (ref 42–52)
HCT VFR BLDA CALC: 38 % — LOW (ref 39–51)
HCT VFR BLDA CALC: 38 % — LOW (ref 39–51)
HGB BLD CALC-MCNC: 12.8 G/DL — SIGNIFICANT CHANGE UP (ref 12.6–17.4)
HGB BLD CALC-MCNC: 12.8 G/DL — SIGNIFICANT CHANGE UP (ref 12.6–17.4)
HGB BLD-MCNC: 12.3 G/DL — LOW (ref 14–18)
HGB BLD-MCNC: 12.3 G/DL — LOW (ref 14–18)
IMM GRANULOCYTES NFR BLD AUTO: 0.3 % — SIGNIFICANT CHANGE UP (ref 0.1–0.3)
IMM GRANULOCYTES NFR BLD AUTO: 0.3 % — SIGNIFICANT CHANGE UP (ref 0.1–0.3)
LACTATE BLDV-MCNC: 1.5 MMOL/L — SIGNIFICANT CHANGE UP (ref 0.5–2)
LACTATE BLDV-MCNC: 1.5 MMOL/L — SIGNIFICANT CHANGE UP (ref 0.5–2)
LYMPHOCYTES # BLD AUTO: 2.38 K/UL — SIGNIFICANT CHANGE UP (ref 1.2–3.4)
LYMPHOCYTES # BLD AUTO: 2.38 K/UL — SIGNIFICANT CHANGE UP (ref 1.2–3.4)
LYMPHOCYTES # BLD AUTO: 27.2 % — SIGNIFICANT CHANGE UP (ref 20.5–51.1)
LYMPHOCYTES # BLD AUTO: 27.2 % — SIGNIFICANT CHANGE UP (ref 20.5–51.1)
MAGNESIUM SERPL-MCNC: 2.4 MG/DL — SIGNIFICANT CHANGE UP (ref 1.8–2.4)
MAGNESIUM SERPL-MCNC: 2.4 MG/DL — SIGNIFICANT CHANGE UP (ref 1.8–2.4)
MCHC RBC-ENTMCNC: 30.4 PG — SIGNIFICANT CHANGE UP (ref 27–31)
MCHC RBC-ENTMCNC: 30.4 PG — SIGNIFICANT CHANGE UP (ref 27–31)
MCHC RBC-ENTMCNC: 31.5 G/DL — LOW (ref 32–37)
MCHC RBC-ENTMCNC: 31.5 G/DL — LOW (ref 32–37)
MCV RBC AUTO: 96.3 FL — HIGH (ref 80–94)
MCV RBC AUTO: 96.3 FL — HIGH (ref 80–94)
MONOCYTES # BLD AUTO: 0.78 K/UL — HIGH (ref 0.1–0.6)
MONOCYTES # BLD AUTO: 0.78 K/UL — HIGH (ref 0.1–0.6)
MONOCYTES NFR BLD AUTO: 8.9 % — SIGNIFICANT CHANGE UP (ref 1.7–9.3)
MONOCYTES NFR BLD AUTO: 8.9 % — SIGNIFICANT CHANGE UP (ref 1.7–9.3)
NEUTROPHILS # BLD AUTO: 5.26 K/UL — SIGNIFICANT CHANGE UP (ref 1.4–6.5)
NEUTROPHILS # BLD AUTO: 5.26 K/UL — SIGNIFICANT CHANGE UP (ref 1.4–6.5)
NEUTROPHILS NFR BLD AUTO: 60 % — SIGNIFICANT CHANGE UP (ref 42.2–75.2)
NEUTROPHILS NFR BLD AUTO: 60 % — SIGNIFICANT CHANGE UP (ref 42.2–75.2)
NRBC # BLD: 0 /100 WBCS — SIGNIFICANT CHANGE UP (ref 0–0)
NRBC # BLD: 0 /100 WBCS — SIGNIFICANT CHANGE UP (ref 0–0)
NT-PROBNP SERPL-SCNC: 5945 PG/ML — HIGH (ref 0–300)
NT-PROBNP SERPL-SCNC: 5945 PG/ML — HIGH (ref 0–300)
PCO2 BLDV: 54 MMHG — SIGNIFICANT CHANGE UP (ref 42–55)
PCO2 BLDV: 54 MMHG — SIGNIFICANT CHANGE UP (ref 42–55)
PH BLDV: 7.25 — LOW (ref 7.32–7.43)
PH BLDV: 7.25 — LOW (ref 7.32–7.43)
PLATELET # BLD AUTO: 121 K/UL — LOW (ref 130–400)
PLATELET # BLD AUTO: 121 K/UL — LOW (ref 130–400)
PMV BLD: 12 FL — HIGH (ref 7.4–10.4)
PMV BLD: 12 FL — HIGH (ref 7.4–10.4)
PO2 BLDV: 22 MMHG — LOW (ref 25–45)
PO2 BLDV: 22 MMHG — LOW (ref 25–45)
POTASSIUM BLDV-SCNC: 5.2 MMOL/L — HIGH (ref 3.5–5.1)
POTASSIUM BLDV-SCNC: 5.2 MMOL/L — HIGH (ref 3.5–5.1)
POTASSIUM SERPL-MCNC: 4.8 MMOL/L — SIGNIFICANT CHANGE UP (ref 3.5–5)
POTASSIUM SERPL-MCNC: 4.8 MMOL/L — SIGNIFICANT CHANGE UP (ref 3.5–5)
POTASSIUM SERPL-MCNC: SIGNIFICANT CHANGE UP MMOL/L (ref 3.5–5)
POTASSIUM SERPL-MCNC: SIGNIFICANT CHANGE UP MMOL/L (ref 3.5–5)
POTASSIUM SERPL-SCNC: 4.8 MMOL/L — SIGNIFICANT CHANGE UP (ref 3.5–5)
POTASSIUM SERPL-SCNC: 4.8 MMOL/L — SIGNIFICANT CHANGE UP (ref 3.5–5)
POTASSIUM SERPL-SCNC: SIGNIFICANT CHANGE UP MMOL/L (ref 3.5–5)
POTASSIUM SERPL-SCNC: SIGNIFICANT CHANGE UP MMOL/L (ref 3.5–5)
PROT SERPL-MCNC: 6.4 G/DL — SIGNIFICANT CHANGE UP (ref 6–8)
PROT SERPL-MCNC: 6.4 G/DL — SIGNIFICANT CHANGE UP (ref 6–8)
PROT SERPL-MCNC: 7 G/DL — SIGNIFICANT CHANGE UP (ref 6–8)
PROT SERPL-MCNC: 7 G/DL — SIGNIFICANT CHANGE UP (ref 6–8)
RBC # BLD: 4.05 M/UL — LOW (ref 4.7–6.1)
RBC # BLD: 4.05 M/UL — LOW (ref 4.7–6.1)
RBC # FLD: 14.9 % — HIGH (ref 11.5–14.5)
RBC # FLD: 14.9 % — HIGH (ref 11.5–14.5)
SAO2 % BLDV: 24.6 % — LOW (ref 67–88)
SAO2 % BLDV: 24.6 % — LOW (ref 67–88)
SODIUM SERPL-SCNC: 136 MMOL/L — SIGNIFICANT CHANGE UP (ref 135–146)
SODIUM SERPL-SCNC: 136 MMOL/L — SIGNIFICANT CHANGE UP (ref 135–146)
SODIUM SERPL-SCNC: 137 MMOL/L — SIGNIFICANT CHANGE UP (ref 135–146)
SODIUM SERPL-SCNC: 137 MMOL/L — SIGNIFICANT CHANGE UP (ref 135–146)
TROPONIN T, HIGH SENSITIVITY RESULT: 46 NG/L — HIGH (ref 6–21)
TROPONIN T, HIGH SENSITIVITY RESULT: 46 NG/L — HIGH (ref 6–21)
WBC # BLD: 8.76 K/UL — SIGNIFICANT CHANGE UP (ref 4.8–10.8)
WBC # BLD: 8.76 K/UL — SIGNIFICANT CHANGE UP (ref 4.8–10.8)
WBC # FLD AUTO: 8.76 K/UL — SIGNIFICANT CHANGE UP (ref 4.8–10.8)
WBC # FLD AUTO: 8.76 K/UL — SIGNIFICANT CHANGE UP (ref 4.8–10.8)

## 2023-12-28 PROCEDURE — C1889: CPT

## 2023-12-28 PROCEDURE — 71045 X-RAY EXAM CHEST 1 VIEW: CPT | Mod: 26

## 2023-12-28 PROCEDURE — 84100 ASSAY OF PHOSPHORUS: CPT

## 2023-12-28 PROCEDURE — 80053 COMPREHEN METABOLIC PANEL: CPT

## 2023-12-28 PROCEDURE — 84443 ASSAY THYROID STIM HORMONE: CPT

## 2023-12-28 PROCEDURE — 71045 X-RAY EXAM CHEST 1 VIEW: CPT

## 2023-12-28 PROCEDURE — 97116 GAIT TRAINING THERAPY: CPT | Mod: GP

## 2023-12-28 PROCEDURE — 76604 US EXAM CHEST: CPT | Mod: 26

## 2023-12-28 PROCEDURE — 84484 ASSAY OF TROPONIN QUANT: CPT

## 2023-12-28 PROCEDURE — 0241U: CPT

## 2023-12-28 PROCEDURE — 93010 ELECTROCARDIOGRAM REPORT: CPT

## 2023-12-28 PROCEDURE — 93925 LOWER EXTREMITY STUDY: CPT

## 2023-12-28 PROCEDURE — 93010 ELECTROCARDIOGRAM REPORT: CPT | Mod: 77

## 2023-12-28 PROCEDURE — 33225 L VENTRIC PACING LEAD ADD-ON: CPT

## 2023-12-28 PROCEDURE — 85652 RBC SED RATE AUTOMATED: CPT

## 2023-12-28 PROCEDURE — 85025 COMPLETE CBC W/AUTO DIFF WBC: CPT

## 2023-12-28 PROCEDURE — 83880 ASSAY OF NATRIURETIC PEPTIDE: CPT

## 2023-12-28 PROCEDURE — 86850 RBC ANTIBODY SCREEN: CPT

## 2023-12-28 PROCEDURE — 83036 HEMOGLOBIN GLYCOSYLATED A1C: CPT

## 2023-12-28 PROCEDURE — C1882: CPT

## 2023-12-28 PROCEDURE — 99285 EMERGENCY DEPT VISIT HI MDM: CPT

## 2023-12-28 PROCEDURE — 99221 1ST HOSP IP/OBS SF/LOW 40: CPT

## 2023-12-28 PROCEDURE — 71046 X-RAY EXAM CHEST 2 VIEWS: CPT

## 2023-12-28 PROCEDURE — 93306 TTE W/DOPPLER COMPLETE: CPT

## 2023-12-28 PROCEDURE — 86901 BLOOD TYPING SEROLOGIC RH(D): CPT

## 2023-12-28 PROCEDURE — C1730: CPT

## 2023-12-28 PROCEDURE — 97162 PT EVAL MOD COMPLEX 30 MIN: CPT | Mod: GP

## 2023-12-28 PROCEDURE — C1892: CPT

## 2023-12-28 PROCEDURE — 97530 THERAPEUTIC ACTIVITIES: CPT | Mod: GP

## 2023-12-28 PROCEDURE — 71275 CT ANGIOGRAPHY CHEST: CPT | Mod: 26,MA

## 2023-12-28 PROCEDURE — 36415 COLL VENOUS BLD VENIPUNCTURE: CPT

## 2023-12-28 PROCEDURE — 80076 HEPATIC FUNCTION PANEL: CPT

## 2023-12-28 PROCEDURE — 90662 IIV NO PRSV INCREASED AG IM: CPT

## 2023-12-28 PROCEDURE — 83735 ASSAY OF MAGNESIUM: CPT

## 2023-12-28 PROCEDURE — 74174 CTA ABD&PLVS W/CONTRAST: CPT | Mod: 26,MA

## 2023-12-28 PROCEDURE — 93284 PRGRMG EVAL IMPLANTABLE DFB: CPT

## 2023-12-28 PROCEDURE — 85027 COMPLETE CBC AUTOMATED: CPT

## 2023-12-28 PROCEDURE — 86900 BLOOD TYPING SEROLOGIC ABO: CPT

## 2023-12-28 PROCEDURE — 80061 LIPID PANEL: CPT

## 2023-12-28 PROCEDURE — 71045 X-RAY EXAM CHEST 1 VIEW: CPT | Mod: 26,77

## 2023-12-28 PROCEDURE — C1777: CPT

## 2023-12-28 PROCEDURE — 86618 LYME DISEASE ANTIBODY: CPT

## 2023-12-28 PROCEDURE — 93005 ELECTROCARDIOGRAM TRACING: CPT

## 2023-12-28 PROCEDURE — 85730 THROMBOPLASTIN TIME PARTIAL: CPT

## 2023-12-28 PROCEDURE — 33249 INSJ/RPLCMT DEFIB W/LEAD(S): CPT

## 2023-12-28 PROCEDURE — 0225U NFCT DS DNA&RNA 21 SARSCOV2: CPT

## 2023-12-28 PROCEDURE — 80048 BASIC METABOLIC PNL TOTAL CA: CPT

## 2023-12-28 PROCEDURE — 83605 ASSAY OF LACTIC ACID: CPT

## 2023-12-28 PROCEDURE — 82962 GLUCOSE BLOOD TEST: CPT

## 2023-12-28 PROCEDURE — 86140 C-REACTIVE PROTEIN: CPT

## 2023-12-28 PROCEDURE — C1769: CPT

## 2023-12-28 PROCEDURE — C1900: CPT

## 2023-12-28 RX ORDER — HEPARIN SODIUM 5000 [USP'U]/ML
5000 INJECTION INTRAVENOUS; SUBCUTANEOUS EVERY 12 HOURS
Refills: 0 | Status: DISCONTINUED | OUTPATIENT
Start: 2023-12-28 | End: 2023-12-28

## 2023-12-28 RX ORDER — GLUCAGON INJECTION, SOLUTION 0.5 MG/.1ML
1 INJECTION, SOLUTION SUBCUTANEOUS ONCE
Refills: 0 | Status: DISCONTINUED | OUTPATIENT
Start: 2023-12-28 | End: 2024-01-09

## 2023-12-28 RX ORDER — ONDANSETRON 8 MG/1
4 TABLET, FILM COATED ORAL ONCE
Refills: 0 | Status: COMPLETED | OUTPATIENT
Start: 2023-12-28 | End: 2023-12-28

## 2023-12-28 RX ORDER — INSULIN LISPRO 100/ML
VIAL (ML) SUBCUTANEOUS
Refills: 0 | Status: DISCONTINUED | OUTPATIENT
Start: 2023-12-28 | End: 2024-01-09

## 2023-12-28 RX ORDER — SENNA PLUS 8.6 MG/1
2 TABLET ORAL ONCE
Refills: 0 | Status: COMPLETED | OUTPATIENT
Start: 2023-12-28 | End: 2023-12-28

## 2023-12-28 RX ORDER — SODIUM CHLORIDE 9 MG/ML
1000 INJECTION, SOLUTION INTRAVENOUS
Refills: 0 | Status: DISCONTINUED | OUTPATIENT
Start: 2023-12-28 | End: 2024-01-09

## 2023-12-28 RX ORDER — DEXTROSE 50 % IN WATER 50 %
25 SYRINGE (ML) INTRAVENOUS ONCE
Refills: 0 | Status: DISCONTINUED | OUTPATIENT
Start: 2023-12-28 | End: 2024-01-09

## 2023-12-28 RX ORDER — INFLUENZA VIRUS VACCINE 15; 15; 15; 15 UG/.5ML; UG/.5ML; UG/.5ML; UG/.5ML
0.7 SUSPENSION INTRAMUSCULAR ONCE
Refills: 0 | Status: COMPLETED | OUTPATIENT
Start: 2023-12-28 | End: 2024-01-08

## 2023-12-28 RX ORDER — DEXTROSE 50 % IN WATER 50 %
12.5 SYRINGE (ML) INTRAVENOUS ONCE
Refills: 0 | Status: DISCONTINUED | OUTPATIENT
Start: 2023-12-28 | End: 2024-01-09

## 2023-12-28 RX ORDER — ASPIRIN/CALCIUM CARB/MAGNESIUM 324 MG
162 TABLET ORAL ONCE
Refills: 0 | Status: COMPLETED | OUTPATIENT
Start: 2023-12-28 | End: 2023-12-28

## 2023-12-28 RX ORDER — DEXTROSE 50 % IN WATER 50 %
15 SYRINGE (ML) INTRAVENOUS ONCE
Refills: 0 | Status: DISCONTINUED | OUTPATIENT
Start: 2023-12-28 | End: 2024-01-09

## 2023-12-28 RX ORDER — MAGNESIUM SULFATE 500 MG/ML
2 VIAL (ML) INJECTION ONCE
Refills: 0 | Status: COMPLETED | OUTPATIENT
Start: 2023-12-28 | End: 2023-12-28

## 2023-12-28 RX ORDER — ATORVASTATIN CALCIUM 80 MG/1
1 TABLET, FILM COATED ORAL
Refills: 0 | DISCHARGE

## 2023-12-28 RX ORDER — SIMVASTATIN 20 MG/1
0 TABLET, FILM COATED ORAL
Qty: 90 | Refills: 0 | DISCHARGE

## 2023-12-28 RX ORDER — MORPHINE SULFATE 50 MG/1
2 CAPSULE, EXTENDED RELEASE ORAL ONCE
Refills: 0 | Status: DISCONTINUED | OUTPATIENT
Start: 2023-12-28 | End: 2023-12-28

## 2023-12-28 RX ADMIN — Medication 162 MILLIGRAM(S): at 14:04

## 2023-12-28 RX ADMIN — MORPHINE SULFATE 2 MILLIGRAM(S): 50 CAPSULE, EXTENDED RELEASE ORAL at 14:04

## 2023-12-28 RX ADMIN — ONDANSETRON 4 MILLIGRAM(S): 8 TABLET, FILM COATED ORAL at 15:27

## 2023-12-28 RX ADMIN — Medication 2: at 18:17

## 2023-12-28 RX ADMIN — Medication 25 GRAM(S): at 14:39

## 2023-12-28 RX ADMIN — SENNA PLUS 2 TABLET(S): 8.6 TABLET ORAL at 21:18

## 2023-12-28 NOTE — H&P ADULT - NSHPPHYSICALEXAM_GEN_ALL_CORE
LOS:     VITALS:   T(C): 35.2 (12-28-23 @ 17:03), Max: 36.2 (12-28-23 @ 12:59)  HR: 77 (12-28-23 @ 18:00) (31 - 77)  BP: 174/96 (12-28-23 @ 18:00) (123/64 - 183/84)  RR: 19 (12-28-23 @ 18:00) (14 - 19)  SpO2: 100% (12-28-23 @ 18:00) (97% - 100%)    GENERAL: NAD, lying in bed comfortably  HEAD:  Atraumatic, Normocephalic  EYES: EOMI, PERRLA, conjunctiva and sclera clear  ENT: Moist mucous membranes  NECK: Supple, No JVD RIJ cordis in place  CHEST/LUNG: coarse but equal  HEART: bradycardic rate normal rhythm; No murmurs, rubs, or gallops  ABDOMEN: BSx4; Soft, nontender, nondistended  EXTREMITIES: risk capillary refill. No clubbing, cyanosis, or edema

## 2023-12-28 NOTE — H&P ADULT - HISTORY OF PRESENT ILLNESS
89M, hx of DM, HTN, HLD, CAD, CABG, hypothyroidism, presented to the ED with 1 day of chest pain and 2 weeks of bilateral LE pain, he states his legs feel heavy, described as soreness and has not been ambulating normally since then. In the ED he was found to be in complete heart block, went for ct scan and returned pulseless code blue was called patient received 1 epi and awoke during compressions mid -cycle was alert and oriented. Had TVP placed ep called planning for pacemaker tomorrow.   In the ED /64 HR 31  Sig Labs BUN/SCr 31/1.5 (normal kidney function prior) Alk phos 163 ast 54 HST 46 pro-BNP 6K  CXR  Patchy right mid lung opacities better characterized on subsequently   performed CT.    CT angio chest ct abdomen pelvis No aortic intramural hematoma, aneurysm or focal dissection. 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 Cholelithiasis and small pericholecystic inflammatory changes, may  represent acute cholecystitis.   Right lung spiculated mass and additional upper lobe lesion, as   described. Findings are concerning for neoplastic process.3    Admitted to CCU

## 2023-12-28 NOTE — ED PROVIDER NOTE - CLINICAL SUMMARY MEDICAL DECISION MAKING FREE TEXT BOX
89-year-old male, past medical history of diabetes, hypertension, hyperlipidemia, CAD, CABG, hypothyroidism, anemia, BPH, nephrolithiasis, presenting for sudden onset chest pain that started at 10 AM, nonradiating, no alleviating or aggravating factors, associated with shortness of breath.  Of note daughter states that he was complaining of bilateral lower extremity heaviness 2 weeks ago but he thought it was soreness because he had just gone to the gym.  However he has not been able to ambulate normally since the onset.  Denies fevers, chills, nausea, vomiting, dizziness, vision changes, abdominal pain, history of smoking.  Elderly male on exam.  Comfortable appearing.  No chest wall tenderness.  Heart rate bradycardic.  Complete heart block on EKG.  Lungs clear to auscultation bilaterally.  Bilateral pitting edema in lower extremities.  Abdomen soft nondistended nontender. Attempted to reach cardiology multiple times.    During examination patient then started complaining of upper back pain.  Patient was sent to CT to rule out dissection.  Patient then had a 15-second pause and was pulseless.  1 round of compressions and 1 epinephrine given.  Patient then returned to full consciousness.  Patient remains in complete heart block.  No other new symptoms.  Discussed with patient and his daughter the need for transvenous pacer.  Written consent obtained.    EP consulted as patients heart rate ranging from 38-65. EP unable to take patient emergently for PPM and recommending TVP insertion paced at 40 in case patient does fall below 40 bpm. CCU consulted and evaluated patient recommending CCU admission after TVP placement.    Labs and EKG were ordered and reviewed.  Imaging was ordered and reviewed by me.  Appropriate medications for patient's presenting complaints were ordered and effects were reassessed.  Additional history was obtained from daughter at bedside.  Escalation to admission/observation was considered.  Patient requires inpatient hospitalization - monitored setting, CCU. 89-year-old male, past medical history of diabetes, hypertension, hyperlipidemia, CAD, CABG, hypothyroidism, anemia, BPH, nephrolithiasis, presenting for sudden onset chest pain that started at 10 AM, nonradiating, no alleviating or aggravating factors, associated with shortness of breath.  Of note daughter states that he was complaining of bilateral lower extremity heaviness 2 weeks ago but he thought it was soreness because he had just gone to the gym.  However he has not been able to ambulate normally since the onset.  Denies fevers, chills, nausea, vomiting, dizziness, vision changes, abdominal pain, history of smoking.  Elderly male on exam.  Comfortable appearing.  No chest wall tenderness.  Heart rate bradycardic.  Complete heart block on EKG.  Lungs clear to auscultation bilaterally.  Bilateral pitting edema in lower extremities.  Abdomen soft nondistended nontender.    During examination patient then started complaining of upper back pain.  Patient was sent to CT to rule out dissection.  Patient then had a 15-second pause and was pulseless.  1 round of compressions and 1 epinephrine given.  Patient then returned to full consciousness.  Patient remains in complete heart block.  No other new symptoms.  Discussed with patient and his daughter the need for transvenous pacer.  Written consent obtained.    EP consulted as patients heart rate ranging from 38-65. EP unable to take patient emergently for PPM and recommending TVP insertion paced at 40 in case patient does fall below 40 bpm. CCU consulted and evaluated patient recommending CCU admission after TVP placement.    Labs and EKG were ordered and reviewed.  Imaging was ordered and reviewed by me.  Appropriate medications for patient's presenting complaints were ordered and effects were reassessed.  Additional history was obtained from daughter at bedside.  Escalation to admission/observation was considered.  Patient requires inpatient hospitalization - monitored setting, CCU.

## 2023-12-28 NOTE — ED PROVIDER NOTE - OBJECTIVE STATEMENT
89 yo m ho DM, htn, hld, CAD, CABG years ago, hypothyroidism presents with cp x 1 day. Sxs began at 10 am suddenly, nonexertional, nonpleurtic, associated with mild back pain. Denies sob, dizziness, weakness, fall, trauma, headache  Accompanied by daughter

## 2023-12-28 NOTE — ED PROVIDER NOTE - PHYSICAL EXAMINATION
CONSTITUTIONAL: NAD, awake, active  SKIN: Warm dry  HEAD: NCAT  EYES: NL inspection  ENT: MMM  NECK: Supple; non tender.  CARD: regular rhythm, bradycardic  RESP: CTAB  ABD: S/NT no R/G  EXT: no pedal edema  NEURO: Grossly unremarkable  PSYCH: Cooperative, appropriate. CONSTITUTIONAL: NAD, awake, active  SKIN: Warm dry  HEAD: NCAT  EYES: NL inspection  ENT: MMM  NECK: Supple; non tender.  CARD: irregular rhythm, bradycardic  RESP: CTAB  ABD: S/NT no R/G  EXT: no pedal edema  NEURO: Grossly unremarkable  PSYCH: Cooperative, appropriate.

## 2023-12-28 NOTE — PATIENT PROFILE ADULT - FALL HARM RISK - HARM RISK INTERVENTIONS
Assistance OOB with selected safe patient handling equipment/Communicate Risk of Fall with Harm to all staff/Discuss with provider need for PT consult/Monitor gait and stability/Provide patient with walking aids - walker, cane, crutches/Reinforce activity limits and safety measures with patient and family/Tailored Fall Risk Interventions/Visual Cue: Yellow wristband and red socks/Bed in lowest position, wheels locked, appropriate side rails in place/Call bell, personal items and telephone in reach/Instruct patient to call for assistance before getting out of bed or chair/Non-slip footwear when patient is out of bed/[place] to call system/Physically safe environment - no spills, clutter or unnecessary equipment/Purposeful Proactive Rounding/Room/bathroom lighting operational, light cord in reach Assistance OOB with selected safe patient handling equipment/Communicate Risk of Fall with Harm to all staff/Discuss with provider need for PT consult/Monitor gait and stability/Provide patient with walking aids - walker, cane, crutches/Reinforce activity limits and safety measures with patient and family/Tailored Fall Risk Interventions/Visual Cue: Yellow wristband and red socks/Bed in lowest position, wheels locked, appropriate side rails in place/Call bell, personal items and telephone in reach/Instruct patient to call for assistance before getting out of bed or chair/Non-slip footwear when patient is out of bed/[employer] to call system/Physically safe environment - no spills, clutter or unnecessary equipment/Purposeful Proactive Rounding/Room/bathroom lighting operational, light cord in reach

## 2023-12-28 NOTE — H&P ADULT - NSHPLABSRESULTS_GEN_ALL_CORE
.  LABS:                         12.3   8.76  )-----------( 121      ( 28 Dec 2023 13:40 )             39.0     12-28    136  |  107  |  31<H>  ----------------------------<  213<H>  TNP   |  19  |  1.5    Ca    9.2      28 Dec 2023 13:40  Mg     2.4     12-28    TPro  7.0  /  Alb  4.1  /  TBili  1.1  /  DBili  x   /  AST  54<H>  /  ALT  21  /  AlkPhos  163<H>  12-28      Urinalysis Basic - ( 28 Dec 2023 13:40 )    Color: x / Appearance: x / SG: x / pH: x  Gluc: 213 mg/dL / Ketone: x  / Bili: x / Urobili: x   Blood: x / Protein: x / Nitrite: x   Leuk Esterase: x / RBC: x / WBC x   Sq Epi: x / Non Sq Epi: x / Bacteria: x            RADIOLOGY, EKG & ADDITIONAL TESTS: Reviewed.

## 2023-12-28 NOTE — ED PROVIDER NOTE - ATTENDING CONTRIBUTION TO CARE
89-year-old male, past medical history of diabetes, hypertension, hyperlipidemia, CAD, CABG, hypothyroidism, anemia, BPH, nephrolithiasis, presenting for sudden onset chest pain that started at 10 AM, nonradiating, no alleviating or aggravating factors, associated with shortness of breath.  Of note daughter states that he was complaining of bilateral lower extremity heaviness 2 weeks ago but he thought it was soreness because he had just gone to the gym.  However he has not been able to ambulate normally since the onset.  Denies fevers, chills, nausea, vomiting, dizziness, vision changes, abdominal pain, history of smoking.  Elderly male on exam.  Comfortable appearing.  No chest wall tenderness.  Heart rate bradycardic.  Complete heart block on EKG.  Lungs clear to auscultation bilaterally.  Bilateral pitting edema in lower extremities.  Abdomen soft nondistended nontender.    During examination patient then started complaining of upper back pain.  Patient was sent to CT to rule out dissection.  Patient then had a 15-second pause and was pulseless.  1 round of compressions and 1 epinephrine given.  Patient then returned to full consciousness.  Patient remains in complete heart block.  No other new symptoms.  Discussed with patient and his daughter the need for transvenous pacer.  Written consent obtained. 89-year-old male, past medical history of diabetes, hypertension, hyperlipidemia, CAD, CABG, hypothyroidism, anemia, BPH, nephrolithiasis, presenting for sudden onset chest pain that started at 10 AM, nonradiating, no alleviating or aggravating factors, associated with shortness of breath.  Of note daughter states that he was complaining of bilateral lower extremity heaviness 2 weeks ago but he thought it was soreness because he had just gone to the gym.  However he has not been able to ambulate normally since the onset.  Denies fevers, chills, nausea, vomiting, dizziness, vision changes, abdominal pain, history of smoking.  Elderly male on exam.  Comfortable appearing.  No chest wall tenderness.  Heart rate bradycardic.  Complete heart block on EKG.  Lungs clear to auscultation bilaterally.  Bilateral pitting edema in lower extremities.  Abdomen soft nondistended nontender. Attempted to reach cardiology multiple times.    During examination patient then started complaining of upper back pain.  Patient was sent to CT to rule out dissection.  Patient then had a 15-second pause and was pulseless.  1 round of compressions and 1 epinephrine given.  Patient then returned to full consciousness.  Patient remains in complete heart block.  No other new symptoms.  Discussed with patient and his daughter the need for transvenous pacer.  Written consent obtained. see mdm

## 2023-12-28 NOTE — ED PROVIDER NOTE - CARE PLAN
1 Principal Discharge DX:	Complete heart block  Secondary Diagnosis:	Acute chest pain  Secondary Diagnosis:	CAD (coronary artery disease)

## 2023-12-28 NOTE — ED PROVIDER NOTE - PROGRESS NOTE DETAILS
SS Patient returned from CT imaging and found to be pulseless - ACLS started, given 1 round of epi and patient awake after 1 round. EKG with complete heart block. HR between 45 - 65.   EP consulted - unable to place PPM today. Rec TVP temporarilty.  CCU consulted - plan for admission SS Sp TVP placement - capture at 3.5 mA, back up rate set to 40. EP aware SS Consulted Vascular surgeon regarding CT findings - will evaluate.  ICU resident aware, will follow

## 2023-12-28 NOTE — CONSULT NOTE ADULT - SUBJECTIVE AND OBJECTIVE BOX
Patient is a 89y old  Male who presents with a chief complaint of     HPI: 89M, hx of DM, HTN, HLD, CAD, CABG, hypothyroidism, he is here with 1 day of chest pain and complains of 2 weeks of bilateral LE pain, he states his legs feel heavy, described as soreness and has not been ambulating normally since then. 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 On exam there is bilateral pitting edema in the LE, the PT is dopplerable bilaterally. DP not dopplerable. The legs feel cool to the touch. The patient was operated on by Dr. Herring in 2020 for a left CFA endarterectomy with fem pop bypass by Dr. Herring.     Patient denies fevers/chills, denies lightheadedness/dizziness, denies nausea/vomiting, denies constipation/diarrhea.      ROS: 10-system review is otherwise negative except HPI above.      PAST MEDICAL & SURGICAL HISTORY:    FAMILY HISTORY:    [] Family history not pertinent as reviewed with the patient and family    SOCIAL HISTORY:  ***    ALLERGIES: No Known Allergies      HOME MEDICATIONS:  ***    CURRENT MEDICATIONS  MEDICATIONS (STANDING):   MEDICATIONS (PRN):  --------------------------------------------------------------------------------------------    Vitals:   T(C): 36.2 (12-28-23 @ 12:59), Max: 36.2 (12-28-23 @ 12:59)  HR: 61 (12-28-23 @ 16:24) (31 - 70)  BP: 173/74 (12-28-23 @ 16:24) (123/64 - 183/84)  RR: 18 (12-28-23 @ 16:24) (16 - 18)  SpO2: 100% (12-28-23 @ 16:24) (97% - 100%)  CAPILLARY BLOOD GLUCOSE        CAPILLARY BLOOD GLUCOSE            Weight (kg): 72.6 (12-28 @ 13:17)    PHYSICAL EXAM: ***  General: NAD, Lying in bed comfortably  Neuro: AAOx3  HEENT: PERRL, EOMI  Cardio: RRR, nml S1/S2  Resp: Good effort, CTA b/l  Thorax: No chest wall tenderness  Breast: No lesions/masses, no drainage  GI/Abd: Soft, NT/ND, no rebound/guarding.   Vascular: bilateral pitting edema in the LE, the PT is dopplerable bilaterally. DP not dopplerable. The legs feel cool to the touch.   Skin: Intact, no breakdown  Lymphatic/Nodes: No palpable lymphadenopathy  Musculoskeletal: All 4 extremities moving spontaneously, no limitations.     --------------------------------------------------------------------------------------------    LABS  CBC (12-28 @ 13:40)                              12.3<L>                         8.76    )----------------(  121<L>     60.0  % Neutrophils, 27.2  % Lymphocytes, ANC: 5.26                                39.0<L>    BMP (12-28 @ 13:40)             136     |  107     |  31<H> 		Ca++ --      Ca 9.2                ---------------------------------( 213<H>		Mg 2.4                TNP     |  19      |  1.5   			Ph --        LFTs (12-28 @ 13:40)      TPro 7.0 / Alb 4.1 / TBili 1.1 / DBili -- / AST 54<H> / ALT 21 / AlkPhos 163<H>          VBG (12-28 @ 13:20)     7.25<L> / 54 / 22<L> / 24 / -4.1<L> / 24.6<L>%     Lactate: 1.5    --------------------------------------------------------------------------------------------    MICROBIOLOGY  Urinalysis (12-28 @ 13:40):     Color:  / Appearance:  / SG:  / pH:  / Gluc: 213<H> / Ketones:  / Bili:  / Urobili:  / Protein : / Nitrites:  / Leuk.Est:  / RBC:  / WBC:  / Sq Epi:  / Non Sq Epi:  / Bacteria          --------------------------------------------------------------------------------------------    IMAGING

## 2023-12-28 NOTE — ED ADULT NURSE REASSESSMENT NOTE - NS ED NURSE REASSESS COMMENT FT1
Upon returning from CT pt became unresponsive and pulseless, 1 round CPR and 1 epi given, ROSC achieved, GSC 15.

## 2023-12-28 NOTE — ED ADULT NURSE NOTE - NSFALLHARMRISKINTERV_ED_ALL_ED
Assistance OOB with selected safe patient handling equipment if applicable/Assistance with ambulation/Communicate risk of Fall with Harm to all staff, patient, and family/Provide visual cue: red socks, yellow wristband, yellow gown, etc/Reinforce activity limits and safety measures with patient and family/Bed in lowest position, wheels locked, appropriate side rails in place/Call bell, personal items and telephone in reach/Instruct patient to call for assistance before getting out of bed/chair/stretcher/Non-slip footwear applied when patient is off stretcher/[place] to call system/Physically safe environment - no spills, clutter or unnecessary equipment/Purposeful Proactive Rounding/Room/bathroom lighting operational, light cord in reach Assistance OOB with selected safe patient handling equipment if applicable/Assistance with ambulation/Communicate risk of Fall with Harm to all staff, patient, and family/Provide visual cue: red socks, yellow wristband, yellow gown, etc/Reinforce activity limits and safety measures with patient and family/Bed in lowest position, wheels locked, appropriate side rails in place/Call bell, personal items and telephone in reach/Instruct patient to call for assistance before getting out of bed/chair/stretcher/Non-slip footwear applied when patient is off stretcher/[place] to call system/Physically safe environment - no spills, clutter or unnecessary equipment/Purposeful Proactive Rounding/Room/bathroom lighting operational, light cord in reach

## 2023-12-28 NOTE — H&P ADULT - ATTENDING COMMENTS
CAD s/p CABG    Presented with AF and high degree AVB  Transient CHB with brief  in-hospital cardiac arrest  s/p TVP    Resting comfortably  AF with demand pacing  BP stable    ECHO reviewed.  Severe LV dysfunction.    1. Atrial fibrillation  2. High-degree AV block / transient CHB  3. CAD s/p CABG  4. NSTEMI  5. ICM (severe dysfunction)    - PPM today as planned  - Resume beta-blocker / GDMT after PPM  - Consider cath pending clinical course CAD s/p CABG    Presented with AF and high degree AVB  Transient CHB with brief  in-hospital cardiac arrest  s/p TVP    Resting comfortably  AF with demand pacing  BP stable    ECHO reviewed.  Severe LV dysfunction.    1. Atrial fibrillation  2. High-degree AV block / transient CHB  3. CAD s/p CABG  4. NSTEMI (possibly type II secondary to cardiac arrest / underlying CAD)  5. ICM (severe dysfunction)    - PPM today as planned  - Resume beta-blocker / GDMT after PPM  - Anticoagulation after PPM per EP  - Consider cath pending clinical course

## 2023-12-28 NOTE — H&P ADULT - ASSESSMENT
89M, hx of DM, HTN, HLD, CAD, CABG, hypothyroidism, presented to the ED with 1 day of chest pain and 2 weeks of bilateral LE pain found to be in complete heart block went into cardiac arrest s/p rosc now for ppm tomorow       IMPRESSION:      PLAN:    CNS: Avoid Sedatives    HEENT: Oral care    PULMONARY:  HOB @ 45 degrees. O2 Sat > 88%.     CARDIOVASCULAR: MAP > 60. TVP in place threshold 40 good capture now for ppm in morning. BNP noted. avoid fluid overload HST noted. TSH ft4. Lyme serology and TTE.     GI: GI prophylaxis.  Npo at midnight   Bowel regimen     RENAL:  Follow up lytes.  Correct as needed.    INFECTIOUS DISEASE: Follow up cultures. Monitor off antibiotics.     HEMATOLOGICAL:  DVT prophylaxis. Hold xarelto for ppm placement tomorrow     ENDOCRINE:  Follow up FS.  Insulin protocol if needed.    MUSCULOSKELETAL: Bedrest for now    Full Code  (Spoke to Daughter at bedside)   89M, hx of DM, HTN, HLD, CAD, CABG, hypothyroidism, presented to the ED with 1 day of chest pain and 2 weeks of bilateral LE pain found to be in complete heart block went into cardiac arrest s/p rosc now for ppm tomorow       IMPRESSION:  Cardiac arrest s/p rosc  3rd degree heart block s/p tvp now   MAUREEN  Ho CABG   Ho HTN   Ho DM    PLAN:    CNS: Avoid Sedatives    HEENT: Oral care    PULMONARY:  HOB @ 45 degrees. O2 Sat > 88%.     CARDIOVASCULAR: MAP > 60. TVP in place threshold 40 good capture now for ppm in morning. BNP noted. avoid fluid overload HST noted. TSH ft4. Lyme serology and TTE.     GI: GI prophylaxis.  Npo at midnight   Bowel regimen     RENAL:  Follow up lytes.  Correct as needed.    INFECTIOUS DISEASE: Follow up cultures. Monitor off antibiotics.     HEMATOLOGICAL:  DVT prophylaxis. Hold xarelto for ppm placement tomorrow    ENDOCRINE:  Follow up FS.  Insulin protocol if needed.    MUSCULOSKELETAL: Bedrest for now    Full Code  (Spoke to Daughter at bedside)

## 2023-12-29 LAB
A1C WITH ESTIMATED AVERAGE GLUCOSE RESULT: 7.1 % — HIGH (ref 4–5.6)
A1C WITH ESTIMATED AVERAGE GLUCOSE RESULT: 7.1 % — HIGH (ref 4–5.6)
ALBUMIN SERPL ELPH-MCNC: 4.2 G/DL — SIGNIFICANT CHANGE UP (ref 3.5–5.2)
ALBUMIN SERPL ELPH-MCNC: 4.2 G/DL — SIGNIFICANT CHANGE UP (ref 3.5–5.2)
ALP SERPL-CCNC: 207 U/L — HIGH (ref 30–115)
ALP SERPL-CCNC: 207 U/L — HIGH (ref 30–115)
ALT FLD-CCNC: 80 U/L — HIGH (ref 0–41)
ALT FLD-CCNC: 80 U/L — HIGH (ref 0–41)
ANION GAP SERPL CALC-SCNC: 15 MMOL/L — HIGH (ref 7–14)
ANION GAP SERPL CALC-SCNC: 15 MMOL/L — HIGH (ref 7–14)
APTT BLD: 39.2 SEC — SIGNIFICANT CHANGE UP (ref 27–39.2)
APTT BLD: 39.2 SEC — SIGNIFICANT CHANGE UP (ref 27–39.2)
APTT BLD: 91.2 SEC — CRITICAL HIGH (ref 27–39.2)
APTT BLD: 91.2 SEC — CRITICAL HIGH (ref 27–39.2)
AST SERPL-CCNC: 236 U/L — HIGH (ref 0–41)
AST SERPL-CCNC: 236 U/L — HIGH (ref 0–41)
B BURGDOR C6 AB SER-ACNC: NEGATIVE — SIGNIFICANT CHANGE UP
B BURGDOR C6 AB SER-ACNC: NEGATIVE — SIGNIFICANT CHANGE UP
B BURGDOR IGG+IGM SER-ACNC: 0.09 INDEX — SIGNIFICANT CHANGE UP (ref 0.01–0.89)
B BURGDOR IGG+IGM SER-ACNC: 0.09 INDEX — SIGNIFICANT CHANGE UP (ref 0.01–0.89)
BASOPHILS # BLD AUTO: 0.03 K/UL — SIGNIFICANT CHANGE UP (ref 0–0.2)
BASOPHILS # BLD AUTO: 0.03 K/UL — SIGNIFICANT CHANGE UP (ref 0–0.2)
BASOPHILS NFR BLD AUTO: 0.3 % — SIGNIFICANT CHANGE UP (ref 0–1)
BASOPHILS NFR BLD AUTO: 0.3 % — SIGNIFICANT CHANGE UP (ref 0–1)
BILIRUB SERPL-MCNC: 1.2 MG/DL — SIGNIFICANT CHANGE UP (ref 0.2–1.2)
BILIRUB SERPL-MCNC: 1.2 MG/DL — SIGNIFICANT CHANGE UP (ref 0.2–1.2)
BLD GP AB SCN SERPL QL: SIGNIFICANT CHANGE UP
BLD GP AB SCN SERPL QL: SIGNIFICANT CHANGE UP
BUN SERPL-MCNC: 36 MG/DL — HIGH (ref 10–20)
BUN SERPL-MCNC: 36 MG/DL — HIGH (ref 10–20)
CALCIUM SERPL-MCNC: 9.7 MG/DL — SIGNIFICANT CHANGE UP (ref 8.4–10.4)
CALCIUM SERPL-MCNC: 9.7 MG/DL — SIGNIFICANT CHANGE UP (ref 8.4–10.4)
CHLORIDE SERPL-SCNC: 104 MMOL/L — SIGNIFICANT CHANGE UP (ref 98–110)
CHLORIDE SERPL-SCNC: 104 MMOL/L — SIGNIFICANT CHANGE UP (ref 98–110)
CO2 SERPL-SCNC: 17 MMOL/L — SIGNIFICANT CHANGE UP (ref 17–32)
CO2 SERPL-SCNC: 17 MMOL/L — SIGNIFICANT CHANGE UP (ref 17–32)
CREAT SERPL-MCNC: 1.8 MG/DL — HIGH (ref 0.7–1.5)
CREAT SERPL-MCNC: 1.8 MG/DL — HIGH (ref 0.7–1.5)
CRP SERPL-MCNC: 11.8 MG/L — HIGH
CRP SERPL-MCNC: 11.8 MG/L — HIGH
EGFR: 35 ML/MIN/1.73M2 — LOW
EGFR: 35 ML/MIN/1.73M2 — LOW
EOSINOPHIL # BLD AUTO: 0.01 K/UL — SIGNIFICANT CHANGE UP (ref 0–0.7)
EOSINOPHIL # BLD AUTO: 0.01 K/UL — SIGNIFICANT CHANGE UP (ref 0–0.7)
EOSINOPHIL NFR BLD AUTO: 0.1 % — SIGNIFICANT CHANGE UP (ref 0–8)
EOSINOPHIL NFR BLD AUTO: 0.1 % — SIGNIFICANT CHANGE UP (ref 0–8)
ERYTHROCYTE [SEDIMENTATION RATE] IN BLOOD: 15 MM/HR — HIGH (ref 0–10)
ERYTHROCYTE [SEDIMENTATION RATE] IN BLOOD: 15 MM/HR — HIGH (ref 0–10)
ESTIMATED AVERAGE GLUCOSE: 157 MG/DL — HIGH (ref 68–114)
ESTIMATED AVERAGE GLUCOSE: 157 MG/DL — HIGH (ref 68–114)
GLUCOSE BLDC GLUCOMTR-MCNC: 190 MG/DL — HIGH (ref 70–99)
GLUCOSE BLDC GLUCOMTR-MCNC: 190 MG/DL — HIGH (ref 70–99)
GLUCOSE BLDC GLUCOMTR-MCNC: 193 MG/DL — HIGH (ref 70–99)
GLUCOSE SERPL-MCNC: 198 MG/DL — HIGH (ref 70–99)
GLUCOSE SERPL-MCNC: 198 MG/DL — HIGH (ref 70–99)
HCT VFR BLD CALC: 36.6 % — LOW (ref 42–52)
HCT VFR BLD CALC: 36.6 % — LOW (ref 42–52)
HCT VFR BLD CALC: 40.9 % — LOW (ref 42–52)
HCT VFR BLD CALC: 40.9 % — LOW (ref 42–52)
HGB BLD-MCNC: 11.4 G/DL — LOW (ref 14–18)
HGB BLD-MCNC: 11.4 G/DL — LOW (ref 14–18)
HGB BLD-MCNC: 12.7 G/DL — LOW (ref 14–18)
HGB BLD-MCNC: 12.7 G/DL — LOW (ref 14–18)
IMM GRANULOCYTES NFR BLD AUTO: 0.3 % — SIGNIFICANT CHANGE UP (ref 0.1–0.3)
IMM GRANULOCYTES NFR BLD AUTO: 0.3 % — SIGNIFICANT CHANGE UP (ref 0.1–0.3)
LYMPHOCYTES # BLD AUTO: 2.82 K/UL — SIGNIFICANT CHANGE UP (ref 1.2–3.4)
LYMPHOCYTES # BLD AUTO: 2.82 K/UL — SIGNIFICANT CHANGE UP (ref 1.2–3.4)
LYMPHOCYTES # BLD AUTO: 27 % — SIGNIFICANT CHANGE UP (ref 20.5–51.1)
LYMPHOCYTES # BLD AUTO: 27 % — SIGNIFICANT CHANGE UP (ref 20.5–51.1)
MAGNESIUM SERPL-MCNC: 2.9 MG/DL — HIGH (ref 1.8–2.4)
MAGNESIUM SERPL-MCNC: 2.9 MG/DL — HIGH (ref 1.8–2.4)
MCHC RBC-ENTMCNC: 29.5 PG — SIGNIFICANT CHANGE UP (ref 27–31)
MCHC RBC-ENTMCNC: 29.5 PG — SIGNIFICANT CHANGE UP (ref 27–31)
MCHC RBC-ENTMCNC: 29.8 PG — SIGNIFICANT CHANGE UP (ref 27–31)
MCHC RBC-ENTMCNC: 29.8 PG — SIGNIFICANT CHANGE UP (ref 27–31)
MCHC RBC-ENTMCNC: 31.1 G/DL — LOW (ref 32–37)
MCV RBC AUTO: 94.8 FL — HIGH (ref 80–94)
MCV RBC AUTO: 94.8 FL — HIGH (ref 80–94)
MCV RBC AUTO: 96 FL — HIGH (ref 80–94)
MCV RBC AUTO: 96 FL — HIGH (ref 80–94)
MONOCYTES # BLD AUTO: 0.91 K/UL — HIGH (ref 0.1–0.6)
MONOCYTES # BLD AUTO: 0.91 K/UL — HIGH (ref 0.1–0.6)
MONOCYTES NFR BLD AUTO: 8.7 % — SIGNIFICANT CHANGE UP (ref 1.7–9.3)
MONOCYTES NFR BLD AUTO: 8.7 % — SIGNIFICANT CHANGE UP (ref 1.7–9.3)
NEUTROPHILS # BLD AUTO: 6.63 K/UL — HIGH (ref 1.4–6.5)
NEUTROPHILS # BLD AUTO: 6.63 K/UL — HIGH (ref 1.4–6.5)
NEUTROPHILS NFR BLD AUTO: 63.6 % — SIGNIFICANT CHANGE UP (ref 42.2–75.2)
NEUTROPHILS NFR BLD AUTO: 63.6 % — SIGNIFICANT CHANGE UP (ref 42.2–75.2)
NRBC # BLD: 0 /100 WBCS — SIGNIFICANT CHANGE UP (ref 0–0)
PHOSPHATE SERPL-MCNC: 4.6 MG/DL — SIGNIFICANT CHANGE UP (ref 2.1–4.9)
PHOSPHATE SERPL-MCNC: 4.6 MG/DL — SIGNIFICANT CHANGE UP (ref 2.1–4.9)
PLATELET # BLD AUTO: 143 K/UL — SIGNIFICANT CHANGE UP (ref 130–400)
PLATELET # BLD AUTO: 143 K/UL — SIGNIFICANT CHANGE UP (ref 130–400)
PLATELET # BLD AUTO: 146 K/UL — SIGNIFICANT CHANGE UP (ref 130–400)
PLATELET # BLD AUTO: 146 K/UL — SIGNIFICANT CHANGE UP (ref 130–400)
PMV BLD: 11.1 FL — HIGH (ref 7.4–10.4)
PMV BLD: 11.1 FL — HIGH (ref 7.4–10.4)
PMV BLD: 11.5 FL — HIGH (ref 7.4–10.4)
PMV BLD: 11.5 FL — HIGH (ref 7.4–10.4)
POTASSIUM SERPL-MCNC: 5 MMOL/L — SIGNIFICANT CHANGE UP (ref 3.5–5)
POTASSIUM SERPL-MCNC: 5 MMOL/L — SIGNIFICANT CHANGE UP (ref 3.5–5)
POTASSIUM SERPL-SCNC: 5 MMOL/L — SIGNIFICANT CHANGE UP (ref 3.5–5)
POTASSIUM SERPL-SCNC: 5 MMOL/L — SIGNIFICANT CHANGE UP (ref 3.5–5)
PROT SERPL-MCNC: 6.9 G/DL — SIGNIFICANT CHANGE UP (ref 6–8)
PROT SERPL-MCNC: 6.9 G/DL — SIGNIFICANT CHANGE UP (ref 6–8)
RBC # BLD: 3.86 M/UL — LOW (ref 4.7–6.1)
RBC # BLD: 3.86 M/UL — LOW (ref 4.7–6.1)
RBC # BLD: 4.26 M/UL — LOW (ref 4.7–6.1)
RBC # BLD: 4.26 M/UL — LOW (ref 4.7–6.1)
RBC # FLD: 14.7 % — HIGH (ref 11.5–14.5)
SODIUM SERPL-SCNC: 136 MMOL/L — SIGNIFICANT CHANGE UP (ref 135–146)
SODIUM SERPL-SCNC: 136 MMOL/L — SIGNIFICANT CHANGE UP (ref 135–146)
T4 FREE+ TSH PNL SERPL: 1.17 UIU/ML — SIGNIFICANT CHANGE UP (ref 0.27–4.2)
T4 FREE+ TSH PNL SERPL: 1.17 UIU/ML — SIGNIFICANT CHANGE UP (ref 0.27–4.2)
TROPONIN T, HIGH SENSITIVITY RESULT: 4152 NG/L — CRITICAL HIGH (ref 6–21)
TROPONIN T, HIGH SENSITIVITY RESULT: 4152 NG/L — CRITICAL HIGH (ref 6–21)
WBC # BLD: 10.43 K/UL — SIGNIFICANT CHANGE UP (ref 4.8–10.8)
WBC # BLD: 10.43 K/UL — SIGNIFICANT CHANGE UP (ref 4.8–10.8)
WBC # BLD: 9.38 K/UL — SIGNIFICANT CHANGE UP (ref 4.8–10.8)
WBC # BLD: 9.38 K/UL — SIGNIFICANT CHANGE UP (ref 4.8–10.8)
WBC # FLD AUTO: 10.43 K/UL — SIGNIFICANT CHANGE UP (ref 4.8–10.8)
WBC # FLD AUTO: 10.43 K/UL — SIGNIFICANT CHANGE UP (ref 4.8–10.8)
WBC # FLD AUTO: 9.38 K/UL — SIGNIFICANT CHANGE UP (ref 4.8–10.8)
WBC # FLD AUTO: 9.38 K/UL — SIGNIFICANT CHANGE UP (ref 4.8–10.8)

## 2023-12-29 PROCEDURE — 93306 TTE W/DOPPLER COMPLETE: CPT | Mod: 26

## 2023-12-29 PROCEDURE — 71045 X-RAY EXAM CHEST 1 VIEW: CPT | Mod: 26,77

## 2023-12-29 PROCEDURE — 99291 CRITICAL CARE FIRST HOUR: CPT

## 2023-12-29 PROCEDURE — 99223 1ST HOSP IP/OBS HIGH 75: CPT | Mod: 57

## 2023-12-29 PROCEDURE — 71045 X-RAY EXAM CHEST 1 VIEW: CPT | Mod: 26

## 2023-12-29 RX ORDER — CEFAZOLIN SODIUM 1 G
2000 VIAL (EA) INJECTION ONCE
Refills: 0 | Status: COMPLETED | OUTPATIENT
Start: 2023-12-29 | End: 2023-12-29

## 2023-12-29 RX ORDER — DAPAGLIFLOZIN 10 MG/1
1 TABLET, FILM COATED ORAL
Refills: 0 | DISCHARGE

## 2023-12-29 RX ORDER — METOPROLOL TARTRATE 50 MG
25 TABLET ORAL
Refills: 0 | Status: DISCONTINUED | OUTPATIENT
Start: 2023-12-29 | End: 2023-12-30

## 2023-12-29 RX ORDER — GABAPENTIN 400 MG/1
300 CAPSULE ORAL DAILY
Refills: 0 | Status: DISCONTINUED | OUTPATIENT
Start: 2023-12-29 | End: 2024-01-09

## 2023-12-29 RX ORDER — HEPARIN SODIUM 5000 [USP'U]/ML
INJECTION INTRAVENOUS; SUBCUTANEOUS
Qty: 25000 | Refills: 0 | Status: DISCONTINUED | OUTPATIENT
Start: 2023-12-28 | End: 2023-12-29

## 2023-12-29 RX ORDER — TAMSULOSIN HYDROCHLORIDE 0.4 MG/1
0.4 CAPSULE ORAL AT BEDTIME
Refills: 0 | Status: DISCONTINUED | OUTPATIENT
Start: 2023-12-29 | End: 2024-01-09

## 2023-12-29 RX ORDER — SITAGLIPTIN 50 MG/1
1 TABLET, FILM COATED ORAL
Refills: 0 | DISCHARGE

## 2023-12-29 RX ORDER — GABAPENTIN 400 MG/1
1 CAPSULE ORAL
Refills: 0 | DISCHARGE

## 2023-12-29 RX ORDER — VANCOMYCIN HCL 1 G
1000 VIAL (EA) INTRAVENOUS ONCE
Refills: 0 | Status: COMPLETED | OUTPATIENT
Start: 2023-12-29 | End: 2023-12-29

## 2023-12-29 RX ORDER — TAMSULOSIN HYDROCHLORIDE 0.4 MG/1
1 CAPSULE ORAL
Refills: 0 | DISCHARGE

## 2023-12-29 RX ORDER — EZETIMIBE 10 MG/1
0 TABLET ORAL
Qty: 90 | Refills: 0 | DISCHARGE

## 2023-12-29 RX ORDER — METOPROLOL TARTRATE 50 MG
0 TABLET ORAL
Qty: 180 | Refills: 0 | DISCHARGE

## 2023-12-29 RX ORDER — CEFAZOLIN SODIUM 1 G
1000 VIAL (EA) INJECTION EVERY 8 HOURS
Refills: 0 | Status: COMPLETED | OUTPATIENT
Start: 2023-12-29 | End: 2023-12-30

## 2023-12-29 RX ORDER — TAMSULOSIN HYDROCHLORIDE 0.4 MG/1
0 CAPSULE ORAL
Qty: 90 | Refills: 0 | DISCHARGE

## 2023-12-29 RX ORDER — ATORVASTATIN CALCIUM 80 MG/1
40 TABLET, FILM COATED ORAL AT BEDTIME
Refills: 0 | Status: DISCONTINUED | OUTPATIENT
Start: 2023-12-29 | End: 2024-01-09

## 2023-12-29 RX ORDER — CEFAZOLIN SODIUM 1 G
1000 VIAL (EA) INJECTION ONCE
Refills: 0 | Status: COMPLETED | OUTPATIENT
Start: 2023-12-29 | End: 2023-12-29

## 2023-12-29 RX ORDER — CHLORHEXIDINE GLUCONATE 213 G/1000ML
1 SOLUTION TOPICAL
Refills: 0 | Status: DISCONTINUED | OUTPATIENT
Start: 2023-12-29 | End: 2024-01-09

## 2023-12-29 RX ORDER — ASPIRIN/CALCIUM CARB/MAGNESIUM 324 MG
81 TABLET ORAL DAILY
Refills: 0 | Status: DISCONTINUED | OUTPATIENT
Start: 2023-12-29 | End: 2024-01-09

## 2023-12-29 RX ORDER — CILOSTAZOL 100 MG/1
0 TABLET ORAL
Qty: 180 | Refills: 0 | DISCHARGE

## 2023-12-29 RX ORDER — BENAZEPRIL HYDROCHLORIDE 40 MG/1
0 TABLET ORAL
Qty: 90 | Refills: 0 | DISCHARGE

## 2023-12-29 RX ADMIN — Medication 250 MILLIGRAM(S): at 14:44

## 2023-12-29 RX ADMIN — HEPARIN SODIUM 1300 UNIT(S)/HR: 5000 INJECTION INTRAVENOUS; SUBCUTANEOUS at 06:28

## 2023-12-29 RX ADMIN — ATORVASTATIN CALCIUM 40 MILLIGRAM(S): 80 TABLET, FILM COATED ORAL at 22:12

## 2023-12-29 RX ADMIN — Medication 100 MILLIGRAM(S): at 22:12

## 2023-12-29 RX ADMIN — TAMSULOSIN HYDROCHLORIDE 0.4 MILLIGRAM(S): 0.4 CAPSULE ORAL at 22:12

## 2023-12-29 RX ADMIN — CHLORHEXIDINE GLUCONATE 1 APPLICATION(S): 213 SOLUTION TOPICAL at 06:29

## 2023-12-29 RX ADMIN — HEPARIN SODIUM 1300 UNIT(S)/HR: 5000 INJECTION INTRAVENOUS; SUBCUTANEOUS at 00:05

## 2023-12-29 NOTE — CONSULT NOTE ADULT - SUBJECTIVE AND OBJECTIVE BOX
Patient is a 89y old  Male who presents with a chief complaint of Cardiac Arrest (28 Dec 2023 17:19)        HPI:  89M, hx of DM, HTN, HLD, CAD, CABG, hypothyroidism, presented to the ED with 1 day of chest pain and 2 weeks of bilateral LE pain, he states his legs feel heavy, described as soreness and has not been ambulating normally since then. In the ED he was found to be in complete heart block, went for ct scan and returned pulseless code blue was called patient received 1 epi and awoke during compressions mid -cycle was alert and oriented. Had TVP placed ep called planning for pacemaker tomorrow.   In the ED /64 HR 31  Sig Labs BUN/SCr 31/1.5 (normal kidney function prior) Alk phos 163 ast 54 HST 46 pro-BNP 6K  CXR  Patchy right mid lung opacities better characterized on subsequently   performed CT.    CT angio chest ct abdomen pelvis No aortic intramural hematoma, aneurysm or focal dissection. 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 Cholelithiasis and small pericholecystic inflammatory changes, may  represent acute cholecystitis.   Right lung spiculated mass and additional upper lobe lesion, as   described. Findings are concerning for neoplastic process.3    Admitted to CCU      (28 Dec 2023 17:19)      Electrophysiology:  89y Male    REVIEW OF SYSTEMS    [ ] A ten-point review of systems was otherwise negative except as noted.  [ ] Due to altered mental status/intubation, subjective information were not able to be obtained from the patient. History was obtained, to the extent possible, from review of the chart and collateral sources of information.      PAST MEDICAL & SURGICAL HISTORY:      Home Medications:  atorvastatin 40 mg oral tablet: 1 tab(s) orally once a day (at bedtime) (28 Dec 2023 18:44)  glipiZIDE 5 mg oral tablet: 1 tab(s) orally once a day (29 Dec 2023 11:05)  Lasix 20 mg oral tablet: 1 tab(s) orally once a day (29 Dec 2023 11:05)      Allergies:  No Known Allergies      FAMILY HISTORY:      SOCIAL HISTORY:    CIGARETTES:  ALCOHOL:  MARIJUANA:  ILLICIT DRUGS:        PREVIOUS DIAGNOSTIC TESTING:      ECHO  FINDINGS:    STRESS  FINDINGS:    CATHETERIZATION  FINDINGS:    ELECTROPHYSIOLOGY STUDY  FINDINGS:    CAROTID ULTRASOUND:  FINDINGS    VENOUS DUPLEX SCAN:  FINDINGS:    CHEST CT PULMONARY ANGIO with IV Contrast:  FINDINGS:      MEDICATIONS  (STANDING):  aspirin  chewable 81 milliGRAM(s) Oral daily  atorvastatin 40 milliGRAM(s) Oral at bedtime  chlorhexidine 2% Cloths 1 Application(s) Topical <User Schedule>  dextrose 5%. 1000 milliLiter(s) (50 mL/Hr) IV Continuous <Continuous>  dextrose 5%. 1000 milliLiter(s) (100 mL/Hr) IV Continuous <Continuous>  dextrose 50% Injectable 12.5 Gram(s) IV Push once  dextrose 50% Injectable 25 Gram(s) IV Push once  dextrose 50% Injectable 25 Gram(s) IV Push once  glucagon  Injectable 1 milliGRAM(s) IntraMuscular once  heparin  Infusion.  Unit(s)/Hr (13 mL/Hr) IV Continuous <Continuous>  influenza  Vaccine (HIGH DOSE) 0.7 milliLiter(s) IntraMuscular once  insulin lispro (ADMELOG) corrective regimen sliding scale   SubCutaneous three times a day before meals    MEDICATIONS  (PRN):  dextrose Oral Gel 15 Gram(s) Oral once PRN Blood Glucose LESS THAN 70 milliGRAM(s)/deciliter      Vital Signs Last 24 Hrs  T(C): 36.1 (29 Dec 2023 08:00), Max: 36.2 (28 Dec 2023 12:59)  T(F): 97 (29 Dec 2023 08:00), Max: 97.1 (28 Dec 2023 12:59)  HR: 63 (29 Dec 2023 10:00) (31 - 85)  BP: 154/81 (29 Dec 2023 10:00) (112/77 - 183/84)  BP(mean): 109 (29 Dec 2023 10:00) (61 - 126)  RR: 15 (29 Dec 2023 10:00) (11 - 28)  SpO2: 100% (29 Dec 2023 10:00) (94% - 100%)    Parameters below as of 29 Dec 2023 10:00  Patient On (Oxygen Delivery Method): nasal cannula  O2 Flow (L/min): 3      PHYSICAL EXAM:    GENERAL: In no apparent distress, well nourished, and hydrated.  HEAD:  Atraumatic, Normocephalic  EYES: EOMI, PERRLA, conjunctiva and sclera clear  NECK: Supple and normal thyroid.  No JVD or carotid bruit.  Carotid pulse is 2+ bilaterally.  HEART: Regular rate and rhythm; No murmurs, rubs, or gallops.  PULMONARY: Clear to auscultation and perfusion.  No rales, wheezing, or rhonchi bilaterally.  ABDOMEN: Soft, Nontender, Nondistended; Bowel sounds present  EXTREMITIES:  2+ Peripheral Pulses, No clubbing, cyanosis, or edema  NEUROLOGICAL: Grossly nonfocal    I&O's Detail    28 Dec 2023 07:01  -  29 Dec 2023 07:00  --------------------------------------------------------  IN:    Heparin Infusion: 104 mL    IV PiggyBack: 50 mL    Oral Fluid: 120 mL  Total IN: 274 mL    OUT:    Voided (mL): 500 mL  Total OUT: 500 mL    Total NET: -226 mL      29 Dec 2023 07:01  -  29 Dec 2023 11:08  --------------------------------------------------------  IN:    Heparin Infusion: 39 mL  Total IN: 39 mL    OUT:  Total OUT: 0 mL    Total NET: 39 mL        Daily     Daily Weight in k.9 (29 Dec 2023 04:00)    INTERPRETATION OF TELEMETRY:    EC Lead ECG:   Ventricular Rate 51 BPM    Atrial Rate 300 BPM    QRS Duration 174 ms    Q-T Interval 504 ms    QTC Calculation(Bazett) 464 ms    R Axis 167 degrees    T Axis 79 degrees    Diagnosis Line Atrial fibrillation with slow ventricular response  Right axis deviation  Left bundle branch block  Abnormal ECG    Confirmed by URSZULA HICKS MD ([id number]) on 2023 5:40:46 PM (23 @ 15:05)  12 Lead ECG:   Ventricular Rate 50 BPM    Atrial Rate 52 BPM    QRS Duration 176 ms    Q-T Interval 512 ms    QTC Calculation(Bazett) 466 ms    R Axis 173 degrees    T Axis 74 degrees    Diagnosis Line Atrial fibrillation with slow ventricular response  Right axis deviation  Left bundle branch block  Abnormal ECG    Confirmed by URSZULA HICKS MD ([id number]) on 2023 5:40:41 PM (23 @ 15:05)  12 Lead ECG:   Ventricular Rate 56 BPM    Atrial Rate 416 BPM    QRS Duration 128 ms    Q-T Interval 392 ms    QTC Calculation(Bazett) 378 ms    R Axis -87 degrees    T Axis 51 degrees    Diagnosis Line Atrial fibrillation  Premature ventricular complexes  Left axis deviation  Right bundle branch block  Septal infarct , age undetermined  Abnormal ECG    Confirmed by CHUCK KIM MD [id number]) on 2023 6:33:02 PM (23 @ 14:27)  12 Lead ECG:   Ventricular Rate 32 BPM    QRS Duration 162 ms    Q-T Interval 556 ms    QTC Calculation(Bazett) 405 ms    R Axis 240 degrees    T Axis 81 degrees    Diagnosis Line Idioventricular rhythm with Premature supraventricular complexes in a pattern  of bigeminy  Right superior axis deviation  Left bundle branch block  Abnormal ECG    Confirmed by URSZULA HICKS MD ([id number]) on 2023 1:16:56 PM (23 @ 13:03)  12 Lead ECG:   Ventricular Rate 38 BPM    QRS Duration 160 ms    Q-T Interval 552 ms    QTC Calculation(Bazett) 438 ms    R Axis 247 degrees    T Axis 89 degrees    Diagnosis Line Atrial fibrillation with slow ventricular response  Right superior axis deviation  Left bundle branch block  Abnormal ECG    Confirmed by URSZULA HICKS MD () on 2023 1:16:06 PM (23 @ 13:03)        LABS:                        11.4   9.38  )-----------( 146      ( 29 Dec 2023 05:45 )             36.6         136  |  104  |  36<H>  ----------------------------<  198<H>  5.0   |  17  |  1.8<H>    Ca    9.7      29 Dec 2023 04:32  Phos  4.6       Mg     2.9         TPro  6.9  /  Alb  4.2  /  TBili  1.2  /  DBili  x   /  AST  236<H>  /  ALT  80<H>  /  AlkPhos  207<H>          PTT - ( 29 Dec 2023 05:45 )  PTT:91.2 sec  Urinalysis Basic - ( 29 Dec 2023 04:32 )    Color: x / Appearance: x / SG: x / pH: x  Gluc: 198 mg/dL / Ketone: x  / Bili: x / Urobili: x   Blood: x / Protein: x / Nitrite: x   Leuk Esterase: x / RBC: x / WBC x   Sq Epi: x / Non Sq Epi: x / Bacteria: x      BNP            RADIOLOGY & ADDITIONAL STUDIES:       Patient is a 89y old  Male who presents with a chief complaint of Cardiac Arrest (28 Dec 2023 17:19)        HPI:  89M, hx of DM, HTN, HLD, CAD, CABG, hypothyroidism, presented to the ED with 1 day of chest pain and 2 weeks of bilateral LE pain, he states his legs feel heavy, described as soreness and has not been ambulating normally since then. In the ED he was found to be in complete heart block, went for ct scan and returned pulseless code blue was called patient received 1 epi and awoke during compressions mid -cycle was alert and oriented. Had TVP placed ep called planning for pacemaker tomorrow.   In the ED /64 HR 31  Sig Labs BUN/SCr 31/1.5 (normal kidney function prior) Alk phos 163 ast 54 HST 46 pro-BNP 6K  CXR  Patchy right mid lung opacities better characterized on subsequently   performed CT.    CT angio chest ct abdomen pelvis No aortic intramural hematoma, aneurysm or focal dissection. 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 Cholelithiasis and small pericholecystic inflammatory changes, may  represent acute cholecystitis.   Right lung spiculated mass and additional upper lobe lesion, as   described. Findings are concerning for neoplastic process.3    Admitted to CCU      (28 Dec 2023 17:19)      Electrophysiology:  89y Male    REVIEW OF SYSTEMS    [ ] A ten-point review of systems was otherwise negative except as noted.  [ ] Due to altered mental status/intubation, subjective information were not able to be obtained from the patient. History was obtained, to the extent possible, from review of the chart and collateral sources of information.      PAST MEDICAL & SURGICAL HISTORY:      Home Medications:  atorvastatin 40 mg oral tablet: 1 tab(s) orally once a day (at bedtime) (28 Dec 2023 18:44)  glipiZIDE 5 mg oral tablet: 1 tab(s) orally once a day (29 Dec 2023 11:05)  Lasix 20 mg oral tablet: 1 tab(s) orally once a day (29 Dec 2023 11:05)      Allergies:  No Known Allergies      FAMILY HISTORY:      SOCIAL HISTORY:    CIGARETTES:  ALCOHOL:  MARIJUANA:  ILLICIT DRUGS:        PREVIOUS DIAGNOSTIC TESTING:      ECHO  FINDINGS:    STRESS  FINDINGS:    CATHETERIZATION  FINDINGS:    ELECTROPHYSIOLOGY STUDY  FINDINGS:    CAROTID ULTRASOUND:  FINDINGS    VENOUS DUPLEX SCAN:  FINDINGS:    CHEST CT PULMONARY ANGIO with IV Contrast:  FINDINGS:      MEDICATIONS  (STANDING):  aspirin  chewable 81 milliGRAM(s) Oral daily  atorvastatin 40 milliGRAM(s) Oral at bedtime  chlorhexidine 2% Cloths 1 Application(s) Topical <User Schedule>  dextrose 5%. 1000 milliLiter(s) (50 mL/Hr) IV Continuous <Continuous>  dextrose 5%. 1000 milliLiter(s) (100 mL/Hr) IV Continuous <Continuous>  dextrose 50% Injectable 12.5 Gram(s) IV Push once  dextrose 50% Injectable 25 Gram(s) IV Push once  dextrose 50% Injectable 25 Gram(s) IV Push once  glucagon  Injectable 1 milliGRAM(s) IntraMuscular once  heparin  Infusion.  Unit(s)/Hr (13 mL/Hr) IV Continuous <Continuous>  influenza  Vaccine (HIGH DOSE) 0.7 milliLiter(s) IntraMuscular once  insulin lispro (ADMELOG) corrective regimen sliding scale   SubCutaneous three times a day before meals    MEDICATIONS  (PRN):  dextrose Oral Gel 15 Gram(s) Oral once PRN Blood Glucose LESS THAN 70 milliGRAM(s)/deciliter      Vital Signs Last 24 Hrs  T(C): 36.1 (29 Dec 2023 08:00), Max: 36.2 (28 Dec 2023 12:59)  T(F): 97 (29 Dec 2023 08:00), Max: 97.1 (28 Dec 2023 12:59)  HR: 63 (29 Dec 2023 10:00) (31 - 85)  BP: 154/81 (29 Dec 2023 10:00) (112/77 - 183/84)  BP(mean): 109 (29 Dec 2023 10:00) (61 - 126)  RR: 15 (29 Dec 2023 10:00) (11 - 28)  SpO2: 100% (29 Dec 2023 10:00) (94% - 100%)    Parameters below as of 29 Dec 2023 10:00  Patient On (Oxygen Delivery Method): nasal cannula  O2 Flow (L/min): 3      PHYSICAL EXAM:    GENERAL: In no apparent distress, well nourished, and hydrated.  HEAD:  Atraumatic, Normocephalic  EYES: EOMI, PERRLA, conjunctiva and sclera clear  NECK: Supple and normal thyroid.  No JVD or carotid bruit.  Carotid pulse is 2+ bilaterally.  HEART: Regular rate and rhythm; No murmurs, rubs, or gallops.  PULMONARY: Clear to auscultation and perfusion.  No rales, wheezing, or rhonchi bilaterally.  ABDOMEN: Soft, Nontender, Nondistended; Bowel sounds present  EXTREMITIES:  2+ Peripheral Pulses, No clubbing, cyanosis, or edema  NEUROLOGICAL: Grossly nonfocal    I&O's Detail    28 Dec 2023 07:01  -  29 Dec 2023 07:00  --------------------------------------------------------  IN:    Heparin Infusion: 104 mL    IV PiggyBack: 50 mL    Oral Fluid: 120 mL  Total IN: 274 mL    OUT:    Voided (mL): 500 mL  Total OUT: 500 mL    Total NET: -226 mL      29 Dec 2023 07:01  -  29 Dec 2023 11:08  --------------------------------------------------------  IN:    Heparin Infusion: 39 mL  Total IN: 39 mL    OUT:  Total OUT: 0 mL    Total NET: 39 mL        Daily     Daily Weight in k.9 (29 Dec 2023 04:00)    INTERPRETATION OF TELEMETRY:    EC Lead ECG:   Ventricular Rate 51 BPM    Atrial Rate 300 BPM    QRS Duration 174 ms    Q-T Interval 504 ms    QTC Calculation(Bazett) 464 ms    R Axis 167 degrees    T Axis 79 degrees    Diagnosis Line Atrial fibrillation with slow ventricular response  Right axis deviation  Left bundle branch block  Abnormal ECG    Confirmed by URSZULA HICKS MD ([id number]) on 2023 5:40:46 PM (23 @ 15:05)  12 Lead ECG:   Ventricular Rate 50 BPM    Atrial Rate 52 BPM    QRS Duration 176 ms    Q-T Interval 512 ms    QTC Calculation(Bazett) 466 ms    R Axis 173 degrees    T Axis 74 degrees    Diagnosis Line Atrial fibrillation with slow ventricular response  Right axis deviation  Left bundle branch block  Abnormal ECG    Confirmed by URSZULA HICKS MD ([id number]) on 2023 5:40:41 PM (23 @ 15:05)  12 Lead ECG:   Ventricular Rate 56 BPM    Atrial Rate 416 BPM    QRS Duration 128 ms    Q-T Interval 392 ms    QTC Calculation(Bazett) 378 ms    R Axis -87 degrees    T Axis 51 degrees    Diagnosis Line Atrial fibrillation  Premature ventricular complexes  Left axis deviation  Right bundle branch block  Septal infarct , age undetermined  Abnormal ECG    Confirmed by CHUCK KIM MD ([id number]) on 2023 6:33:02 PM (23 @ 14:27)  12 Lead ECG:   Ventricular Rate 32 BPM    QRS Duration 162 ms    Q-T Interval 556 ms    QTC Calculation(Bazett) 405 ms    R Axis 240 degrees    T Axis 81 degrees    Diagnosis Line Idioventricular rhythm with Premature supraventricular complexes in a pattern  of bigeminy  Right superior axis deviation  Left bundle branch block  Abnormal ECG    Confirmed by URSZULA HICKS MD [id number]) on 2023 1:16:56 PM (23 @ 13:03)  12 Lead ECG:   Ventricular Rate 38 BPM    QRS Duration 160 ms    Q-T Interval 552 ms    QTC Calculation(Bazett) 438 ms    R Axis 247 degrees    T Axis 89 degrees    Diagnosis Line Atrial fibrillation with slow ventricular response  Right superior axis deviation  Left bundle branch block  Abnormal ECG    Confirmed by URSZULA HICKS MD ([id number]) on 2023 1:16:06 PM (23 @ 13:03)        LABS:                        11.4   9.38  )-----------( 146      ( 29 Dec 2023 05:45 )             36.6         136  |  104  |  36<H>  ----------------------------<  198<H>  5.0   |  17  |  1.8<H>    Ca    9.7      29 Dec 2023 04:32  Phos  4.6       Mg     2.9         TPro  6.9  /  Alb  4.2  /  TBili  1.2  /  DBili  x   /  AST  236<H>  /  ALT  80<H>  /  AlkPhos  207<H>          PTT - ( 29 Dec 2023 05:45 )  PTT:91.2 sec  Urinalysis Basic - ( 29 Dec 2023 04:32 )    Color: x / Appearance: x / SG: x / pH: x  Gluc: 198 mg/dL / Ketone: x  / Bili: x / Urobili: x   Blood: x / Protein: x / Nitrite: x   Leuk Esterase: x / RBC: x / WBC x   Sq Epi: x / Non Sq Epi: x / Bacteria: x      BNP            RADIOLOGY & ADDITIONAL STUDIES:       Patient is a 89y old  Male who presents with a chief complaint of Cardiac Arrest (28 Dec 2023 17:19)    HPI:  89M, hx of DM, HTN, HLD, CAD, CABG, AFIB, hypothyroidism, presented to the ED with 1 day of chest pain and 2 weeks of bilateral LE pain, he states his legs feel heavy, described as soreness and has not been ambulating normally since then. In the ED he was found to be in complete heart block, went for ct scan and returned pulseless code blue was called patient received 1 epi and awoke during compressions mid -cycle was alert and oriented. TVP placed was placed in ED and patient was admitted to CCU for management with plan for BIV ICD implant on 2023 afternoon.   EKG: AFIB with slow ventricular response.  Trop: [id number]    REVIEW OF SYSTEMS  [x] A ten-point review of systems was otherwise negative except as noted.  [ ] Due to altered mental status/intubation, subjective information were not able to be obtained from the patient. History was obtained, to the extent possible, from review of the chart and collateral sources of information.    PAST MEDICAL & SURGICAL HISTORY:    Home Medications:  atorvastatin 40 mg oral tablet: 1 tab(s) orally once a day (at bedtime) (28 Dec 2023 18:44)  glipiZIDE 5 mg oral tablet: 1 tab(s) orally once a day (29 Dec 2023 11:05)  Lasix 20 mg oral tablet: 1 tab(s) orally once a day (29 Dec 2023 11:05)      Allergies:  No Known Allergies      FAMILY HISTORY:      SOCIAL HISTORY:    CIGARETTES:  ALCOHOL:  MARIJUANA:  ILLICIT DRUGS:        PREVIOUS DIAGNOSTIC TESTING:      ECHO  FINDINGS: pending reading but preliminary EF ~20%    STRESS  FINDINGS:    CATHETERIZATION  FINDINGS:    ELECTROPHYSIOLOGY STUDY  FINDINGS:    CAROTID ULTRASOUND:  FINDINGS    VENOUS DUPLEX SCAN:  FINDINGS:    CT chest/ abdomen/ palvis  FINDINGS:  No aortic intramural hematoma, aneurysm or focal dissection. 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  Cholelithiasis and small pericholecystic inflammatory changes, may   represent acute cholecystitis. Correlate with lab values and physical   exam.  Right lung spiculated mass and additional upper lobe lesion, as   described. Findings are concerning for neoplastic process.    MEDICATIONS  (STANDING):  aspirin  chewable 81 milliGRAM(s) Oral daily  atorvastatin 40 milliGRAM(s) Oral at bedtime  chlorhexidine 2% Cloths 1 Application(s) Topical <User Schedule>  dextrose 5%. 1000 milliLiter(s) (50 mL/Hr) IV Continuous <Continuous>  dextrose 5%. 1000 milliLiter(s) (100 mL/Hr) IV Continuous <Continuous>  dextrose 50% Injectable 12.5 Gram(s) IV Push once  dextrose 50% Injectable 25 Gram(s) IV Push once  dextrose 50% Injectable 25 Gram(s) IV Push once  glucagon  Injectable 1 milliGRAM(s) IntraMuscular once  heparin  Infusion.  Unit(s)/Hr (13 mL/Hr) IV Continuous <Continuous>  influenza  Vaccine (HIGH DOSE) 0.7 milliLiter(s) IntraMuscular once  insulin lispro (ADMELOG) corrective regimen sliding scale   SubCutaneous three times a day before meals    MEDICATIONS  (PRN):  dextrose Oral Gel 15 Gram(s) Oral once PRN Blood Glucose LESS THAN 70 milliGRAM(s)/deciliter      Vital Signs Last 24 Hrs  T(C): 36.1 (29 Dec 2023 08:00), Max: 36.2 (28 Dec 2023 12:59)  T(F): 97 (29 Dec 2023 08:00), Max: 97.1 (28 Dec 2023 12:59)  HR: 63 (29 Dec 2023 10:00) (31 - 85)  BP: 154/81 (29 Dec 2023 10:00) (112/77 - 183/84)  BP(mean): 109 (29 Dec 2023 10:00) (61 - 126)  RR: 15 (29 Dec 2023 10:00) (11 - 28)  SpO2: 100% (29 Dec 2023 10:00) (94% - 100%)    Parameters below as of 29 Dec 2023 10:00  Patient On (Oxygen Delivery Method): nasal cannula  O2 Flow (L/min): 3      PHYSICAL EXAM:  GENERAL: In no apparent distress, well nourished, and hydrated.  EYES: EOMI, PERRLA  NECK: Supple and normal thyroid.    HEART: Irregularly irregular. TVP in place at VVI 60 bpm.  PULMONARY: Clear to auscultation and perfusion.   ABDOMEN: Soft, Nontender  NEUROLOGICAL: Grossly nonfocal          INTERPRETATION OF TELEMETRY:  EC Lead ECG:   Ventricular Rate 51 BPM  Atrial Rate 300 BPM  QRS Duration 174 ms  Q-T Interval 504 ms  QTC Calculation(Bazett) 464 ms  R Axis 167 degrees  T Axis 79 degrees  Diagnosis Line Atrial fibrillation with slow ventricular response  Right axis deviation  Left bundle branch block  Abnormal ECG  Confirmed by URSZULA HICKS MD ([id number]) on 2023 5:40:46 PM (23 @ 15:05)    12 Lead ECG:   Ventricular Rate 50 BPM  Atrial Rate 52 BPM  QRS Duration 176 ms  Q-T Interval 512 ms  QTC Calculation(Bazett) 466 ms  R Axis 173 degrees  T Axis 74 degrees  Diagnosis Line Atrial fibrillation with slow ventricular response  Right axis deviation  Left bundle branch block  Abnormal ECG  Confirmed by URSZULA HICKS MD [id number]) on 2023 5:40:41 PM (23 @ 15:05      Confirmed by URSZULA HICKS MD ([id number]) on 2023 1:16:56 PM (23 @ 13:03)  12 Lead ECG:   Ventricular Rate 38 BPM  QRS Duration 160 ms  Q-T Interval 552 ms  QTC Calculation(Bazett) 438 ms  R Axis 247 degrees  T Axis 89 degrees  Diagnosis Line Atrial fibrillation with slow ventricular response  Right superior axis deviation  Left bundle branch block  Abnormal ECG  Confirmed by URSZULA HICKS MD ([id number]) on 2023 1:16:06 PM (23 @ 13:03)      LABS:                        11.4   9.38  )-----------( 146      ( 29 Dec 2023 05:45 )             36.6         136  |  104  |  36<H>  ----------------------------<  198<H>  5.0   |  17  |  1.8<H>    Ca    9.7      29 Dec 2023 04:32  Phos  4.6       Mg     2.9         TPro  6.9  /  Alb  4.2  /  TBili  1.2  /  DBili  x   /  AST  236<H>  /  ALT  80<H>  /  AlkPhos  207<H>        PTT - ( 29 Dec 2023 05:45 )  PTT:91.2 sec  Urinalysis Basic - ( 29 Dec 2023 04:32 )    Color: x / Appearance: x / SG: x / pH: x  Gluc: 198 mg/dL / Ketone: x  / Bili: x / Urobili: x   Blood: x / Protein: x / Nitrite: x   Leuk Esterase: x / RBC: x / WBC x   Sq Epi: x / Non Sq Epi: x / Bacteria: x

## 2023-12-29 NOTE — CONSULT NOTE ADULT - ASSESSMENT
ASSESSMENT: 89M, hx of DM, HTN, HLD, CAD, CABG, hypothyroidism, he is here with 1 day of chest pain and complains of 2 weeks of bilateral LE pain, he states his legs feel heavy, described as soreness and has not been ambulating normally since then. 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 On exam there is bilateral pitting edema in the LE, the PT is dopplerable bilaterally. DP not dopplerable. The legs feel cool to the touch. The patient was operated on by Dr. Herring in 2020 for a left CFA endarterectomy with fem pop bypass by Dr. Herring.       PLAN:   - No acute surgical intervention   - Patient seen/examined or Plan Discussed with Fellow, Dr. Rueda  - Plan to be discussed with Attending, Dr. Herring
89M, hx of DM, HTN, HLD, CAD, CABG, AFIB, hypothyroidism, presented to the ED with 1 day of chest pain and 2 weeks of bilateral LE pain, he states his legs feel heavy, described as soreness and has not been ambulating normally since then. In the ED he was found to be in complete heart block, went for ct scan and returned pulseless code blue was called patient received 1 epi and awoke during compressions mid -cycle was alert and oriented. TVP placed was placed in ED and patient was admitted to CCU for management with plan for BIV ICD implant on 12/29/2023 afternoon.     - cardiac arrest  - AF with slow ventricular response  - CAD    Plan for BIV ICD  today afternoon with Dr. Lopez  NPO  Awaiting official TTE results, preliminary EF~ 20%  Heparin d/melita this am in preparation for the procedure  Will follow

## 2023-12-29 NOTE — CHART NOTE - NSCHARTNOTEFT_GEN_A_CORE
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
CCU Transfer Note    Transfer from: CCU  Transfer to:  (  ) Medicine    ( x ) Telemetry    (  ) RCU    (  ) Palliative    (  ) Stroke Unit    (  ) _______________    History of Present Illness:   89M, hx of DM, HTN, HLD, CAD sp CABG (years ago), hypothyroidism, presented to the ED with 1 day of chest pain and 2 weeks of bilateral LE pain, he states his legs feel heavy, described as soreness and has not been ambulating normally since then. In the ED he was found to be in complete heart block, went for ct scan and returned pulseless, code blue was called, patient received 1 epi and awoke during compressions mid -cycle, was alert and oriented. Had TVP placed. EP called planning for pacemaker.   In the ED /64 HR 31  Sig Labs BUN/SCr 31/1.5 (normal kidney function prior) Alk phos 163 ast 54 HST 46 pro-BNP 6K  CXR  Patchy right mid lung opacities better characterized on subsequently   performed CT.    CT angio chest ct abdomen pelvis No aortic intramural hematoma, aneurysm or focal dissection. 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 Cholelithiasis and small pericholecystic inflammatory changes, may  represent acute cholecystitis.   Right lung spiculated mass and additional upper lobe lesion, as   described. Findings are concerning for neoplastic process.    Admitted to CCU for monitoring  In CCU, patient was found in be in afib, started on heparin drip  Paced rhythm   Pacemaker today.  Transfer to 4T after pacemaker.       MEDICATIONS:  STANDING MEDICATIONS  aspirin  chewable 81 milliGRAM(s) Oral daily  atorvastatin 40 milliGRAM(s) Oral at bedtime  chlorhexidine 2% Cloths 1 Application(s) Topical <User Schedule>  dextrose 5%. 1000 milliLiter(s) IV Continuous <Continuous>  dextrose 5%. 1000 milliLiter(s) IV Continuous <Continuous>  dextrose 50% Injectable 12.5 Gram(s) IV Push once  dextrose 50% Injectable 25 Gram(s) IV Push once  dextrose 50% Injectable 25 Gram(s) IV Push once  glucagon  Injectable 1 milliGRAM(s) IntraMuscular once  heparin  Infusion.  Unit(s)/Hr IV Continuous <Continuous>  influenza  Vaccine (HIGH DOSE) 0.7 milliLiter(s) IntraMuscular once  insulin lispro (ADMELOG) corrective regimen sliding scale   SubCutaneous three times a day before meals    PRN MEDICATIONS  dextrose Oral Gel 15 Gram(s) Oral once PRN      VITAL SIGNS: Last 24 Hours  T(C): 36.1 (29 Dec 2023 08:00), Max: 36.2 (28 Dec 2023 12:59)  T(F): 97 (29 Dec 2023 08:00), Max: 97.1 (28 Dec 2023 12:59)  HR: 63 (29 Dec 2023 10:00) (31 - 85)  BP: 154/81 (29 Dec 2023 10:00) (112/77 - 183/84)  BP(mean): 109 (29 Dec 2023 10:00) (61 - 126)  RR: 15 (29 Dec 2023 10:00) (11 - 28)  SpO2: 100% (29 Dec 2023 10:00) (94% - 100%)      LABS:                        11.4   9.38  )-----------( 146      ( 29 Dec 2023 05:45 )             36.6     12-29    136  |  104  |  36<H>  ----------------------------<  198<H>  5.0   |  17  |  1.8<H>    Ca    9.7      29 Dec 2023 04:32  Phos  4.6     12-29  Mg     2.9     12-29    TPro  6.9  /  Alb  4.2  /  TBili  1.2  /  DBili  x   /  AST  236<H>  /  ALT  80<H>  /  AlkPhos  207<H>  12-29    PTT - ( 29 Dec 2023 05:45 )  PTT:91.2 sec  Urinalysis Basic - ( 29 Dec 2023 04:32 )    Color: x / Appearance: x / SG: x / pH: x  Gluc: 198 mg/dL / Ketone: x  / Bili: x / Urobili: x   Blood: x / Protein: x / Nitrite: x   Leuk Esterase: x / RBC: x / WBC x   Sq Epi: x / Non Sq Epi: x / Bacteria: x              RADIOLOGY:

## 2023-12-29 NOTE — CONSULT NOTE ADULT - TIME BILLING
CHB  ICM  BiV-ICD Implant   I have explained the procedure to the patient.  I have explained the risks and benefits of the procedure to the patient.  There is approximately 1% chance of any major cardiovascular complication to occur. Complications include, but are not limited to infection, bleeding, damage to the vessels, pneumothorax, stroke, death and heart attack.  The patient understands the risk and would like to proceed with the procedure. Patient indicated that all of his questions were answered to his satisfaction and verbalized understanding.

## 2023-12-29 NOTE — PRE-ANESTHESIA EVALUATION ADULT - NSRADCARDRESULTSFT_GEN_ALL_CORE
TTE 12/29/23  Summary:   1. Severely decreased global left ventricular systolic function with a   biplane EF of 31%. Mild eccentric left ventricular hypertrophy. Diastolic   function is indeterminate.   2. Normal right ventricular size with moderately reduced systolic   function.   3. Mildly enlarged left atrium.   4. Normalright atrial size.   5. Mild to moderate mitral valve regurgitation.   6. Sclerotic aortic valve with decreased opening. Focal calcification   along the left coronary cusp.   7. Mild-moderate tricuspid regurgitation.   8. Mild pulmonic valve regurgitation.   9. Severe pulmonary hypertension (PASP = 66mmHg). The IVC is dilated and   non-collapsing, indicating increased right atrial pressure.  10. There is no evidence of pericardial effusion.

## 2023-12-30 LAB
ALBUMIN SERPL ELPH-MCNC: 4 G/DL — SIGNIFICANT CHANGE UP (ref 3.5–5.2)
ALBUMIN SERPL ELPH-MCNC: 4 G/DL — SIGNIFICANT CHANGE UP (ref 3.5–5.2)
ALP SERPL-CCNC: 174 U/L — HIGH (ref 30–115)
ALP SERPL-CCNC: 174 U/L — HIGH (ref 30–115)
ALT FLD-CCNC: 80 U/L — HIGH (ref 0–41)
ALT FLD-CCNC: 80 U/L — HIGH (ref 0–41)
ANION GAP SERPL CALC-SCNC: 12 MMOL/L — SIGNIFICANT CHANGE UP (ref 7–14)
ANION GAP SERPL CALC-SCNC: 12 MMOL/L — SIGNIFICANT CHANGE UP (ref 7–14)
ANION GAP SERPL CALC-SCNC: 15 MMOL/L — HIGH (ref 7–14)
ANION GAP SERPL CALC-SCNC: 15 MMOL/L — HIGH (ref 7–14)
AST SERPL-CCNC: 204 U/L — HIGH (ref 0–41)
AST SERPL-CCNC: 204 U/L — HIGH (ref 0–41)
BILIRUB SERPL-MCNC: 1.3 MG/DL — HIGH (ref 0.2–1.2)
BILIRUB SERPL-MCNC: 1.3 MG/DL — HIGH (ref 0.2–1.2)
BUN SERPL-MCNC: 43 MG/DL — HIGH (ref 10–20)
BUN SERPL-MCNC: 43 MG/DL — HIGH (ref 10–20)
BUN SERPL-MCNC: 52 MG/DL — HIGH (ref 10–20)
BUN SERPL-MCNC: 52 MG/DL — HIGH (ref 10–20)
CALCIUM SERPL-MCNC: 9.3 MG/DL — SIGNIFICANT CHANGE UP (ref 8.4–10.4)
CALCIUM SERPL-MCNC: 9.3 MG/DL — SIGNIFICANT CHANGE UP (ref 8.4–10.4)
CALCIUM SERPL-MCNC: 9.4 MG/DL — SIGNIFICANT CHANGE UP (ref 8.4–10.5)
CALCIUM SERPL-MCNC: 9.4 MG/DL — SIGNIFICANT CHANGE UP (ref 8.4–10.5)
CHLORIDE SERPL-SCNC: 100 MMOL/L — SIGNIFICANT CHANGE UP (ref 98–110)
CO2 SERPL-SCNC: 17 MMOL/L — SIGNIFICANT CHANGE UP (ref 17–32)
CO2 SERPL-SCNC: 17 MMOL/L — SIGNIFICANT CHANGE UP (ref 17–32)
CO2 SERPL-SCNC: 22 MMOL/L — SIGNIFICANT CHANGE UP (ref 17–32)
CO2 SERPL-SCNC: 22 MMOL/L — SIGNIFICANT CHANGE UP (ref 17–32)
CREAT SERPL-MCNC: 2 MG/DL — HIGH (ref 0.7–1.5)
CREAT SERPL-MCNC: 2 MG/DL — HIGH (ref 0.7–1.5)
CREAT SERPL-MCNC: 2.4 MG/DL — HIGH (ref 0.7–1.5)
CREAT SERPL-MCNC: 2.4 MG/DL — HIGH (ref 0.7–1.5)
EGFR: 25 ML/MIN/1.73M2 — LOW
EGFR: 25 ML/MIN/1.73M2 — LOW
EGFR: 31 ML/MIN/1.73M2 — LOW
EGFR: 31 ML/MIN/1.73M2 — LOW
GLUCOSE BLDC GLUCOMTR-MCNC: 152 MG/DL — HIGH (ref 70–99)
GLUCOSE BLDC GLUCOMTR-MCNC: 152 MG/DL — HIGH (ref 70–99)
GLUCOSE BLDC GLUCOMTR-MCNC: 157 MG/DL — HIGH (ref 70–99)
GLUCOSE BLDC GLUCOMTR-MCNC: 157 MG/DL — HIGH (ref 70–99)
GLUCOSE BLDC GLUCOMTR-MCNC: 164 MG/DL — HIGH (ref 70–99)
GLUCOSE BLDC GLUCOMTR-MCNC: 164 MG/DL — HIGH (ref 70–99)
GLUCOSE BLDC GLUCOMTR-MCNC: 174 MG/DL — HIGH (ref 70–99)
GLUCOSE BLDC GLUCOMTR-MCNC: 174 MG/DL — HIGH (ref 70–99)
GLUCOSE SERPL-MCNC: 168 MG/DL — HIGH (ref 70–99)
GLUCOSE SERPL-MCNC: 168 MG/DL — HIGH (ref 70–99)
GLUCOSE SERPL-MCNC: 175 MG/DL — HIGH (ref 70–99)
GLUCOSE SERPL-MCNC: 175 MG/DL — HIGH (ref 70–99)
HCT VFR BLD CALC: 36.5 % — LOW (ref 42–52)
HCT VFR BLD CALC: 36.5 % — LOW (ref 42–52)
HGB BLD-MCNC: 11.5 G/DL — LOW (ref 14–18)
HGB BLD-MCNC: 11.5 G/DL — LOW (ref 14–18)
MAGNESIUM SERPL-MCNC: 2.7 MG/DL — HIGH (ref 1.8–2.4)
MAGNESIUM SERPL-MCNC: 2.7 MG/DL — HIGH (ref 1.8–2.4)
MAGNESIUM SERPL-MCNC: 2.8 MG/DL — HIGH (ref 1.8–2.4)
MAGNESIUM SERPL-MCNC: 2.8 MG/DL — HIGH (ref 1.8–2.4)
MCHC RBC-ENTMCNC: 29.9 PG — SIGNIFICANT CHANGE UP (ref 27–31)
MCHC RBC-ENTMCNC: 29.9 PG — SIGNIFICANT CHANGE UP (ref 27–31)
MCHC RBC-ENTMCNC: 31.5 G/DL — LOW (ref 32–37)
MCHC RBC-ENTMCNC: 31.5 G/DL — LOW (ref 32–37)
MCV RBC AUTO: 95.1 FL — HIGH (ref 80–94)
MCV RBC AUTO: 95.1 FL — HIGH (ref 80–94)
NRBC # BLD: 0 /100 WBCS — SIGNIFICANT CHANGE UP (ref 0–0)
NRBC # BLD: 0 /100 WBCS — SIGNIFICANT CHANGE UP (ref 0–0)
PLATELET # BLD AUTO: 135 K/UL — SIGNIFICANT CHANGE UP (ref 130–400)
PLATELET # BLD AUTO: 135 K/UL — SIGNIFICANT CHANGE UP (ref 130–400)
PMV BLD: 11 FL — HIGH (ref 7.4–10.4)
PMV BLD: 11 FL — HIGH (ref 7.4–10.4)
POTASSIUM SERPL-MCNC: 5 MMOL/L — SIGNIFICANT CHANGE UP (ref 3.5–5)
POTASSIUM SERPL-MCNC: 5 MMOL/L — SIGNIFICANT CHANGE UP (ref 3.5–5)
POTASSIUM SERPL-MCNC: 5.3 MMOL/L — HIGH (ref 3.5–5)
POTASSIUM SERPL-MCNC: 5.3 MMOL/L — HIGH (ref 3.5–5)
POTASSIUM SERPL-SCNC: 5 MMOL/L — SIGNIFICANT CHANGE UP (ref 3.5–5)
POTASSIUM SERPL-SCNC: 5 MMOL/L — SIGNIFICANT CHANGE UP (ref 3.5–5)
POTASSIUM SERPL-SCNC: 5.3 MMOL/L — HIGH (ref 3.5–5)
POTASSIUM SERPL-SCNC: 5.3 MMOL/L — HIGH (ref 3.5–5)
PROT SERPL-MCNC: 6.5 G/DL — SIGNIFICANT CHANGE UP (ref 6–8)
PROT SERPL-MCNC: 6.5 G/DL — SIGNIFICANT CHANGE UP (ref 6–8)
RBC # BLD: 3.84 M/UL — LOW (ref 4.7–6.1)
RBC # BLD: 3.84 M/UL — LOW (ref 4.7–6.1)
RBC # FLD: 14.6 % — HIGH (ref 11.5–14.5)
RBC # FLD: 14.6 % — HIGH (ref 11.5–14.5)
SODIUM SERPL-SCNC: 132 MMOL/L — LOW (ref 135–146)
SODIUM SERPL-SCNC: 132 MMOL/L — LOW (ref 135–146)
SODIUM SERPL-SCNC: 134 MMOL/L — LOW (ref 135–146)
SODIUM SERPL-SCNC: 134 MMOL/L — LOW (ref 135–146)
WBC # BLD: 9.47 K/UL — SIGNIFICANT CHANGE UP (ref 4.8–10.8)
WBC # BLD: 9.47 K/UL — SIGNIFICANT CHANGE UP (ref 4.8–10.8)
WBC # FLD AUTO: 9.47 K/UL — SIGNIFICANT CHANGE UP (ref 4.8–10.8)
WBC # FLD AUTO: 9.47 K/UL — SIGNIFICANT CHANGE UP (ref 4.8–10.8)

## 2023-12-30 PROCEDURE — 71045 X-RAY EXAM CHEST 1 VIEW: CPT | Mod: 26,77,59

## 2023-12-30 PROCEDURE — 71045 X-RAY EXAM CHEST 1 VIEW: CPT | Mod: 26,59

## 2023-12-30 PROCEDURE — 99233 SBSQ HOSP IP/OBS HIGH 50: CPT

## 2023-12-30 PROCEDURE — 71046 X-RAY EXAM CHEST 2 VIEWS: CPT | Mod: 26

## 2023-12-30 RX ORDER — ISOSORBIDE DINITRATE 5 MG/1
5 TABLET ORAL THREE TIMES A DAY
Refills: 0 | Status: DISCONTINUED | OUTPATIENT
Start: 2023-12-30 | End: 2024-01-09

## 2023-12-30 RX ORDER — FUROSEMIDE 40 MG
20 TABLET ORAL ONCE
Refills: 0 | Status: COMPLETED | OUTPATIENT
Start: 2023-12-30 | End: 2023-12-30

## 2023-12-30 RX ORDER — CEPHALEXIN 500 MG
500 CAPSULE ORAL EVERY 12 HOURS
Refills: 0 | Status: COMPLETED | OUTPATIENT
Start: 2023-12-31 | End: 2024-01-04

## 2023-12-30 RX ORDER — FUROSEMIDE 40 MG
20 TABLET ORAL ONCE
Refills: 0 | Status: DISCONTINUED | OUTPATIENT
Start: 2023-12-31 | End: 2023-12-31

## 2023-12-30 RX ORDER — METOPROLOL TARTRATE 50 MG
50 TABLET ORAL DAILY
Refills: 0 | Status: DISCONTINUED | OUTPATIENT
Start: 2023-12-31 | End: 2024-01-09

## 2023-12-30 RX ORDER — SENNA PLUS 8.6 MG/1
2 TABLET ORAL AT BEDTIME
Refills: 0 | Status: DISCONTINUED | OUTPATIENT
Start: 2023-12-30 | End: 2024-01-09

## 2023-12-30 RX ORDER — HYDRALAZINE HCL 50 MG
10 TABLET ORAL THREE TIMES A DAY
Refills: 0 | Status: DISCONTINUED | OUTPATIENT
Start: 2023-12-30 | End: 2024-01-09

## 2023-12-30 RX ADMIN — Medication 10 MILLIGRAM(S): at 21:48

## 2023-12-30 RX ADMIN — SENNA PLUS 2 TABLET(S): 8.6 TABLET ORAL at 21:48

## 2023-12-30 RX ADMIN — Medication 20 MILLIGRAM(S): at 18:52

## 2023-12-30 RX ADMIN — ISOSORBIDE DINITRATE 5 MILLIGRAM(S): 5 TABLET ORAL at 14:20

## 2023-12-30 RX ADMIN — Medication 81 MILLIGRAM(S): at 12:07

## 2023-12-30 RX ADMIN — Medication 25 MILLIGRAM(S): at 17:17

## 2023-12-30 RX ADMIN — Medication 25 MILLIGRAM(S): at 05:07

## 2023-12-30 RX ADMIN — TAMSULOSIN HYDROCHLORIDE 0.4 MILLIGRAM(S): 0.4 CAPSULE ORAL at 21:48

## 2023-12-30 RX ADMIN — ATORVASTATIN CALCIUM 40 MILLIGRAM(S): 80 TABLET, FILM COATED ORAL at 21:48

## 2023-12-30 RX ADMIN — Medication 20 MILLIGRAM(S): at 10:21

## 2023-12-30 RX ADMIN — Medication 100 MILLIGRAM(S): at 14:19

## 2023-12-30 RX ADMIN — GABAPENTIN 300 MILLIGRAM(S): 400 CAPSULE ORAL at 12:07

## 2023-12-30 RX ADMIN — Medication 100 MILLIGRAM(S): at 09:25

## 2023-12-30 RX ADMIN — Medication 1: at 12:06

## 2023-12-30 RX ADMIN — Medication 1: at 17:16

## 2023-12-30 NOTE — PROGRESS NOTE ADULT - SUBJECTIVE AND OBJECTIVE BOX
Chief complaint: Patient is a 89y old  Male who presents with a chief complaint of Cardiac Arrest (30 Dec 2023 12:13)    Interval history:  Transfer in from CCU. POD #1 Biv ICD implant     Past medical history is notable for: DM, HTN, HLD, CAD sp CABG    Review of systems: a complete 10- point review of systems was obtained and is negative except as stated in the interval history.    Vitals:  T(F): 98.2, Max: 98.4 (12-30 @ 00:00)  HR: 69 (69 - 75)  BP: 131/75 (131/75 - 178/93)  RR: 18 (15 - 22)  SpO2: 94% (94% - 100%)    Ins & outs:     12-28 @ 07:01  -  12-29 @ 07:00  --------------------------------------------------------  IN: 274 mL / OUT: 500 mL / NET: -226 mL    12-29 @ 07:01  -  12-30 @ 07:00  --------------------------------------------------------  IN: 39 mL / OUT: 500 mL / NET: -461 mL      Weight trend:  Weight (kg): 72.575 (12-29)    Physical exam:  General: No apparent distress  HEENT: Anicteric sclera. Moist mucous membranes. no JVD noted   Cardiac: Regular rate and rhythm. No murmurs, rubs, or gallops.   Vascular: Symmetric radial pulses. Dorsalis pedis pulses palpable.   Respiratory: Normal effort. Bibasilar crackles  Abdomen: Soft, nontender. Audible bowel sounds.   Extremities: Warm with +2 edema LE. No cyanosis or clubbing.   Skin: Warm and dry. No rash. left upper chest, ICD pocket site C/D/I. steri strips in place   Neurologic: Grossly normal motor function.   Psychiatric: Euthymic. Oriented to person, place, and time.     Data reviewed:  - Telemetry: V-paced on tele    - ECG < from: 12 Lead ECG (12.28.23 @ 15:05) >  Diagnosis Line Atrial fibrillation with slow ventricular response  Right axis deviation  Left bundle branch block  Abnormal ECG        - Echo < from: TTE Echo Complete w/o Contrast w/ Doppler (12.29.23 @ 09:30) >  ummary:   1. Severely decreased global left ventricular systolic function with a   biplane EF of 31%. Mild eccentric left ventricular hypertrophy. Diastolic   function is indeterminate.   2. Normal right ventricular size with moderately reduced systolic   function.   3. Mildly enlarged left atrium.   4. Normalright atrial size.   5. Mild to moderate mitral valve regurgitation.   6. Sclerotic aortic valve with decreased opening. Focal calcification   along the left coronary cusp.   7. Mild-moderate tricuspid regurgitation.   8. Mild pulmonic valve regurgitation.   9. Severe pulmonary hypertension (PASP = 66mmHg). The IVC is dilated and   non-collapsing, indicating increased right atrial pressure.  10. There is no evidence of pericardial effusion.      - Radiology:   Xray Chest 2 Views PA/Lat (12.30.23 @ 08:43) >  Impression:    Bilateral opacities, left basilar focal atelectasis and cardiomegaly,   unchanged.    CT angio chest ct abdomen pelvis No aortic intramural hematoma, aneurysm or focal dissection. 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 Cholelithiasis and small pericholecystic inflammatory changes, may  represent acute cholecystitis.   Right lung spiculated mass and additional upper lobe lesion, as   described. Findings are concerning for neoplastic process.    - Labs:                        11.5   9.47  )-----------( 135      ( 30 Dec 2023 05:21 )             36.5     12-30    132<L>  |  100  |  43<H>  ----------------------------<  175<H>  5.3<H>   |  17  |  2.0<H>    Ca    9.4      30 Dec 2023 05:21  Phos  4.6     12-29  Mg     2.7     12-30    TPro  6.5  /  Alb  4.0  /  TBili  1.3<H>  /  DBili  x   /  AST  204<H>  /  ALT  80<H>  /  AlkPhos  174<H>  12-30    LIVER FUNCTIONS - ( 30 Dec 2023 05:21 )  Alb: 4.0 g/dL / Pro: 6.5 g/dL / ALK PHOS: 174 U/L / ALT: 80 U/L / AST: 204 U/L / GGT: x           PTT - ( 29 Dec 2023 13:05 )  PTT:39.2 sec          Urinalysis Basic - ( 30 Dec 2023 05:21 )    Color: x / Appearance: x / SG: x / pH: x  Gluc: 175 mg/dL / Ketone: x  / Bili: x / Urobili: x   Blood: x / Protein: x / Nitrite: x   Leuk Esterase: x / RBC: x / WBC x   Sq Epi: x / Non Sq Epi: x / Bacteria: x      - Cultures:    Medications:  aspirin  chewable 81 milliGRAM(s) Oral daily  atorvastatin 40 milliGRAM(s) Oral at bedtime  ceFAZolin   IVPB 1000 milliGRAM(s) IV Intermittent every 8 hours  chlorhexidine 2% Cloths 1 Application(s) Topical <User Schedule>  dextrose 50% Injectable 25 Gram(s) IV Push once  dextrose 50% Injectable 12.5 Gram(s) IV Push once  dextrose 50% Injectable 25 Gram(s) IV Push once  gabapentin 300 milliGRAM(s) Oral daily  glucagon  Injectable 1 milliGRAM(s) IntraMuscular once  influenza  Vaccine (HIGH DOSE) 0.7 milliLiter(s) IntraMuscular once  insulin lispro (ADMELOG) corrective regimen sliding scale   SubCutaneous three times a day before meals  isosorbide   dinitrate Tablet (ISORDIL) 5 milliGRAM(s) Oral three times a day  metoprolol tartrate 25 milliGRAM(s) Oral two times a day  tamsulosin 0.4 milliGRAM(s) Oral at bedtime    Drips:  dextrose 5%. 1000 milliLiter(s) (50 mL/Hr) IV Continuous <Continuous>  dextrose 5%. 1000 milliLiter(s) (100 mL/Hr) IV Continuous <Continuous>               Chief complaint: Patient is a 89y old  Male who presents with a chief complaint of Cardiac Arrest (30 Dec 2023 12:13)    Interval history:  Transfer in from CCU. POD #1 Biv ICD implant   No acute event     Past medical history is notable for: DM, HTN, HLD, CAD sp CABG    Review of systems: a complete 10- point review of systems was obtained and is negative except as stated in the interval history.    Vitals:  T(F): 98.2, Max: 98.4 (12-30 @ 00:00)  HR: 69 (69 - 75)  BP: 131/75 (131/75 - 178/93)  RR: 18 (15 - 22)  SpO2: 94% (94% - 100%)    Ins & outs:     12-28 @ 07:01  -  12-29 @ 07:00  --------------------------------------------------------  IN: 274 mL / OUT: 500 mL / NET: -226 mL    12-29 @ 07:01  -  12-30 @ 07:00  --------------------------------------------------------  IN: 39 mL / OUT: 500 mL / NET: -461 mL      Weight trend:  Weight (kg): 72.575 (12-29)    Physical exam:  General: No apparent distress  HEENT: Anicteric sclera. Moist mucous membranes. no JVD noted   Cardiac: Regular rate and rhythm. No murmurs, rubs, or gallops.   Vascular: Symmetric radial pulses. Dorsalis pedis pulses palpable.   Respiratory: Normal effort. Bibasilar crackles  Abdomen: Soft, nontender. Audible bowel sounds.   Extremities: Warm with +2 edema LE. No cyanosis or clubbing.   Skin: Warm and dry. No rash. left upper chest, ICD pocket site C/D/I. steri strips in place   Neurologic: Grossly normal motor function.   Psychiatric: Euthymic. Oriented to person, place, and time.     Data reviewed:  - Telemetry: V-paced on tele    - ECG < from: 12 Lead ECG (12.28.23 @ 15:05) >  Diagnosis Line Atrial fibrillation with slow ventricular response  Right axis deviation  Left bundle branch block  Abnormal ECG        - Echo < from: TTE Echo Complete w/o Contrast w/ Doppler (12.29.23 @ 09:30) >  Summary:   1. Severely decreased global left ventricular systolic function with a   biplane EF of 31%. Mild eccentric left ventricular hypertrophy. Diastolic   function is indeterminate.   2. Normal right ventricular size with moderately reduced systolic   function.   3. Mildly enlarged left atrium.   4. Normalright atrial size.   5. Mild to moderate mitral valve regurgitation.   6. Sclerotic aortic valve with decreased opening. Focal calcification   along the left coronary cusp.   7. Mild-moderate tricuspid regurgitation.   8. Mild pulmonic valve regurgitation.   9. Severe pulmonary hypertension (PASP = 66mmHg). The IVC is dilated and   non-collapsing, indicating increased right atrial pressure.  10. There is no evidence of pericardial effusion.      - Radiology:   Xray Chest 2 Views PA/Lat (12.30.23 @ 08:43) >  Impression:    Bilateral opacities, left basilar focal atelectasis and cardiomegaly,   unchanged.    CT angio chest ct abdomen pelvis No aortic intramural hematoma, aneurysm or focal dissection. 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 Cholelithiasis and small pericholecystic inflammatory changes, may  represent acute cholecystitis.   Right lung spiculated mass and additional upper lobe lesion, as   described. Findings are concerning for neoplastic process.    - Labs:                        11.5   9.47  )-----------( 135      ( 30 Dec 2023 05:21 )             36.5     12-30    132<L>  |  100  |  43<H>  ----------------------------<  175<H>  5.3<H>   |  17  |  2.0<H>    Ca    9.4      30 Dec 2023 05:21  Phos  4.6     12-29  Mg     2.7     12-30    TPro  6.5  /  Alb  4.0  /  TBili  1.3<H>  /  DBili  x   /  AST  204<H>  /  ALT  80<H>  /  AlkPhos  174<H>  12-30    LIVER FUNCTIONS - ( 30 Dec 2023 05:21 )  Alb: 4.0 g/dL / Pro: 6.5 g/dL / ALK PHOS: 174 U/L / ALT: 80 U/L / AST: 204 U/L / GGT: x           PTT - ( 29 Dec 2023 13:05 )  PTT:39.2 sec          Urinalysis Basic - ( 30 Dec 2023 05:21 )    Color: x / Appearance: x / SG: x / pH: x  Gluc: 175 mg/dL / Ketone: x  / Bili: x / Urobili: x   Blood: x / Protein: x / Nitrite: x   Leuk Esterase: x / RBC: x / WBC x   Sq Epi: x / Non Sq Epi: x / Bacteria: x      - Cultures:    Medications:  aspirin  chewable 81 milliGRAM(s) Oral daily  atorvastatin 40 milliGRAM(s) Oral at bedtime  ceFAZolin   IVPB 1000 milliGRAM(s) IV Intermittent every 8 hours  chlorhexidine 2% Cloths 1 Application(s) Topical <User Schedule>  dextrose 50% Injectable 25 Gram(s) IV Push once  dextrose 50% Injectable 12.5 Gram(s) IV Push once  dextrose 50% Injectable 25 Gram(s) IV Push once  gabapentin 300 milliGRAM(s) Oral daily  glucagon  Injectable 1 milliGRAM(s) IntraMuscular once  influenza  Vaccine (HIGH DOSE) 0.7 milliLiter(s) IntraMuscular once  insulin lispro (ADMELOG) corrective regimen sliding scale   SubCutaneous three times a day before meals  isosorbide   dinitrate Tablet (ISORDIL) 5 milliGRAM(s) Oral three times a day  metoprolol tartrate 25 milliGRAM(s) Oral two times a day  tamsulosin 0.4 milliGRAM(s) Oral at bedtime    Drips:  dextrose 5%. 1000 milliLiter(s) (50 mL/Hr) IV Continuous <Continuous>  dextrose 5%. 1000 milliLiter(s) (100 mL/Hr) IV Continuous <Continuous>

## 2023-12-30 NOTE — PROGRESS NOTE ADULT - ASSESSMENT
89M, hx of DM, HTN, HLD, CAD, CABG, AFIB, hypothyroidism, presented to the ED with 1 day of chest pain and 2 weeks of bilateral LE pain, he states his legs feel heavy, described as soreness and has not been ambulating normally since then. In the ED he was found to be in complete heart block, went for ct scan and returned pulseless code blue was called patient received 1 epi and awoke during compressions mid -cycle was alert and oriented. TVP placed was placed in ED and patient was admitted to CCU for management with plan for BIV ICD implant on 12/29/2023 afternoon.     Impression:  Cardiac Arrest sp BiV ICD Implant ([facility])  EF 31%  Slow AF  CAD sp CABG  HTN  HLD  DM    Plan:  - CXR completed  - Interrogation completed, numbers within appropriate range  - No anticoagulation 48 hours postop  - Start Keflex 500mg BID x 5 days  - Continue all other home medications  - Do not lift L arm above shoulder height or lift > 5 lbs for 6 weeks  - Do not get area wet for 3 days  - Leave steri strips in place until they fall off on their own  - Follow up with Dr Lopez in one month

## 2023-12-30 NOTE — PROGRESS NOTE ADULT - SUBJECTIVE AND OBJECTIVE BOX
INTERVAL HPI/OVERNIGHT EVENTS:  No acute events overnight  Patient SP BiV ICD Implant POD#1  HD stable and feeling well    MEDICATIONS  (STANDING):  aspirin  chewable 81 milliGRAM(s) Oral daily  atorvastatin 40 milliGRAM(s) Oral at bedtime  ceFAZolin   IVPB 1000 milliGRAM(s) IV Intermittent every 8 hours  chlorhexidine 2% Cloths 1 Application(s) Topical <User Schedule>  dextrose 5%. 1000 milliLiter(s) (100 mL/Hr) IV Continuous <Continuous>  dextrose 5%. 1000 milliLiter(s) (50 mL/Hr) IV Continuous <Continuous>  dextrose 50% Injectable 25 Gram(s) IV Push once  dextrose 50% Injectable 25 Gram(s) IV Push once  dextrose 50% Injectable 12.5 Gram(s) IV Push once  gabapentin 300 milliGRAM(s) Oral daily  glucagon  Injectable 1 milliGRAM(s) IntraMuscular once  influenza  Vaccine (HIGH DOSE) 0.7 milliLiter(s) IntraMuscular once  insulin lispro (ADMELOG) corrective regimen sliding scale   SubCutaneous three times a day before meals  metoprolol tartrate 25 milliGRAM(s) Oral two times a day  tamsulosin 0.4 milliGRAM(s) Oral at bedtime    MEDICATIONS  (PRN):  dextrose Oral Gel 15 Gram(s) Oral once PRN Blood Glucose LESS THAN 70 milliGRAM(s)/deciliter      Allergies    No Known Allergies    Intolerances        REVIEW OF SYSTEMS: No CP, palpitations, dizziness or SOB    Vital Signs Last 24 Hrs  T(C): 36.7 (30 Dec 2023 08:00), Max: 36.9 (30 Dec 2023 00:00)  T(F): 98 (30 Dec 2023 08:00), Max: 98.4 (30 Dec 2023 00:00)  HR: 69 (30 Dec 2023 08:00) (60 - 75)  BP: 164/87 (30 Dec 2023 08:00) (137/73 - 178/93)  BP(mean): 118 (30 Dec 2023 08:00) (109 - 129)  RR: 18 (30 Dec 2023 08:00) (14 - 22)  SpO2: 100% (30 Dec 2023 08:00) (95% - 100%)    Parameters below as of 30 Dec 2023 08:00  Patient On (Oxygen Delivery Method): nasal cannula  O2 Flow (L/min): 3      12-29-23 @ 07:01  -  12-30-23 @ 07:00  --------------------------------------------------------  IN: 39 mL / OUT: 500 mL / NET: -461 mL        Physical Exam  GENERAL: In no apparent distress, well nourished, and hydrated.  EYES: EOMI, PERRLA, conjunctiva and sclera clear  NECK: Supple  HEART: Regular rate and rhythm; No murmurs, rubs, or gallops.  PULMONARY: Clear to auscultation and perfusion.  No rales, wheezing, or rhonchi bilaterally.  EXTREMITIES:  2+ Peripheral Pulses, No clubbing, cyanosis, or edema  SKIN: Dressing removed from L chest wall, steri strips open to air. No hematoma  NEUROLOGICAL: Grossly nonfocal    LABS:                        11.5   9.47  )-----------( 135      ( 30 Dec 2023 05:21 )             36.5     12-30    132<L>  |  100  |  43<H>  ----------------------------<  175<H>  5.3<H>   |  17  |  2.0<H>    Ca    9.4      30 Dec 2023 05:21  Phos  4.6     12-29  Mg     2.7     12-30    TPro  6.5  /  Alb  4.0  /  TBili  1.3<H>  /  DBili  x   /  AST  204<H>  /  ALT  80<H>  /  AlkPhos  174<H>  12-30    PTT - ( 29 Dec 2023 13:05 )  PTT:39.2 sec  Urinalysis Basic - ( 30 Dec 2023 05:21 )    Color: x / Appearance: x / SG: x / pH: x  Gluc: 175 mg/dL / Ketone: x  / Bili: x / Urobili: x   Blood: x / Protein: x / Nitrite: x   Leuk Esterase: x / RBC: x / WBC x   Sq Epi: x / Non Sq Epi: x / Bacteria: x        RADIOLOGY & ADDITIONAL TESTS:

## 2023-12-30 NOTE — PROGRESS NOTE ADULT - NS ATTEND OPT1 GEN_ALL_CORE
I attest my time as attending is greater than 50% of the total combined time spent on qualifying patient care activities by the PA/NP and attending. caffeine/never used

## 2023-12-30 NOTE — PROGRESS NOTE ADULT - ASSESSMENT
89M, hx of DM, HTN, HLD, CAD sp CABG (years ago), hypothyroidism, brought in by daughter to ED with 1 day of chest pain and 2 weeks of bilateral LE "heaviness" described as soreness and has not been ambulating normally since then. In the ED , found to have slow afib and high degree AVB. Sustained brief cardiac arrest in ED, achieved ROSC and was admitted to CCU. Had TVP placed. On 12/29th, he underwent Biv ICD implant by Dr. Lopez. He was transferred to  post implantation.     Impression and Plan:    # Atrial fibrillation  #. High-degree AV block / transient CHB  Transient CHB with brief  in-hospital cardiac arrest  s/p TVP in CCU  ECHO Severe LV dysfunction. EF 31%. Severe pulmonary hypertension (PASP = 66mmHg). The IVC is dilated and non-collapsing, indicating increased right atrial pressure.  -  SP BiV ICD Implant POD#1. CXR done with no pneumothorax.  Interrogation completed, numbers within appropriate range  - cont on metoprolol 25mg BID, plan to switch to toprol 50mg 12/31  - No anticoagulation 48 hours postop   - complete x3 doses of Ancef postop. Start on 12/31 Keflex 500mg BID x 5 days  - Post ICD implant education provided to both daughter and patient at bedside  - EP following   - continue monitoring on telemetry      # CAD s/p CABG  # NSTEMI (possibly type II secondary to cardiac arrest / underlying CAD)  #ICM (severe dysfunction)  On admission CXR Patchy right mid lung opacities  - cont BB   - Start on isordil 5mg TID  - received lasix 20mg IV x1 in Am, will reeval diuretic need base don urine output  - HOLD on starting ACEi/ARB d/t MAUREEN   - possible cath for ischemic w/u when renal function improve and clinical improvement       # Right lung mass possibly neoplastic note don CT scan   - will consult oncology           CT angio chest ct abdomen pelvis No aortic intramural hematoma, aneurysm or focal dissection. 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 Cholelithiasis and small pericholecystic inflammatory changes, may  represent acute cholecystitis.   Right lung spiculated mass and additional upper lobe lesion, as   described. Findings are concerning for neoplastic process.        -Recommend arterial duplex of the BL LE after pacemaker is placed and when the patient becomes more stable  - No acute vascular intervention for the celiac root stenosis.   89M, hx of DM, HTN, HLD, CAD sp CABG (years ago), hypothyroidism, brought in by daughter to ED with 1 day of chest pain and 2 weeks of bilateral LE "heaviness" described as soreness and has not been ambulating normally since then. In the ED , found to have slow afib and high degree AVB. Sustained brief cardiac arrest in ED, achieved ROSC and was admitted to CCU. Had TVP placed. On 12/29th, he underwent Biv ICD implant by Dr. Lopez. He was transferred to  post implantation.     Impression and Plan:    # Atrial fibrillation  #. High-degree AV block / transient CHB  Transient CHB with brief  in-hospital cardiac arrest  s/p TVP in CCU  ECHO Severe LV dysfunction. EF 31%. Severe pulmonary hypertension (PASP = 66mmHg). The IVC is dilated and non-collapsing, indicating increased right atrial pressure.  -  SP BiV ICD Implant POD#1. CXR done with no pneumothorax.  Interrogation completed, numbers within appropriate range  - cont on metoprolol 25mg BID, plan to switch to Toprol 50mg 12/31  - No anticoagulation 48 hours postop   - complete x3 doses of Ancef postop. Start on 12/31 Keflex 500mg BID x 5 days  - Post ICD implant education provided to both daughter and patient at bedside  - EP following   - continue monitoring on cardiology telemetry unit      # CAD s/p CABG  # NSTEMI (possibly type II secondary to cardiac arrest / underlying CAD)  #ICM (severe dysfunction)  #HTN  on admission troponin 4152  - cont BB , cont on ASA 81MG daily  - Start on isordil 5mg TID  - received lasix 20mg IV x1 in Am, will reeval diuretic need base don urine output ( lasix 20mg po at home)   - HOLD on starting ACEi/ARB d/t MAUREEN ( hold home dose of benazepril 20mg )   - possible cath for ischemic w/u when renal function improve and clinical improvement       # Right lung mass possibly neoplastic note don CT scan   On admission CXR Patchy right mid lung opacities  - will consider consult oncology   - F/U AM CXR   - cont on lasix IV daily     #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 diabetic neuropathy  A1C 7.1   - Cont on ISS while inpatient ( hold home PO diabetic meds for now d/t MAUREEN )   - F.S ACHS   - cont on gabapentin 300mg po daily     #HLD  - cont on atorvastatin 40mg po  -  f/u lipid panel in Am     # BPH  - Cont on tamsulosin 0.4mg  - strict I & O    miscellaneous  CODE STATUS: Full code  diet: dash , carb consistent  DVT prophylaxis: currently anticoagulant on HOLD x 48hrs post ICD implant

## 2023-12-31 LAB
ANION GAP SERPL CALC-SCNC: 12 MMOL/L — SIGNIFICANT CHANGE UP (ref 7–14)
ANION GAP SERPL CALC-SCNC: 12 MMOL/L — SIGNIFICANT CHANGE UP (ref 7–14)
ANION GAP SERPL CALC-SCNC: 14 MMOL/L — SIGNIFICANT CHANGE UP (ref 7–14)
ANION GAP SERPL CALC-SCNC: 14 MMOL/L — SIGNIFICANT CHANGE UP (ref 7–14)
BUN SERPL-MCNC: 62 MG/DL — CRITICAL HIGH (ref 10–20)
BUN SERPL-MCNC: 62 MG/DL — CRITICAL HIGH (ref 10–20)
BUN SERPL-MCNC: 66 MG/DL — CRITICAL HIGH (ref 10–20)
BUN SERPL-MCNC: 66 MG/DL — CRITICAL HIGH (ref 10–20)
CALCIUM SERPL-MCNC: 9.1 MG/DL — SIGNIFICANT CHANGE UP (ref 8.4–10.5)
CALCIUM SERPL-MCNC: 9.1 MG/DL — SIGNIFICANT CHANGE UP (ref 8.4–10.5)
CALCIUM SERPL-MCNC: 9.2 MG/DL — SIGNIFICANT CHANGE UP (ref 8.4–10.4)
CALCIUM SERPL-MCNC: 9.2 MG/DL — SIGNIFICANT CHANGE UP (ref 8.4–10.4)
CHLORIDE SERPL-SCNC: 98 MMOL/L — SIGNIFICANT CHANGE UP (ref 98–110)
CHOLEST SERPL-MCNC: 114 MG/DL — SIGNIFICANT CHANGE UP
CHOLEST SERPL-MCNC: 114 MG/DL — SIGNIFICANT CHANGE UP
CO2 SERPL-SCNC: 18 MMOL/L — SIGNIFICANT CHANGE UP (ref 17–32)
CO2 SERPL-SCNC: 18 MMOL/L — SIGNIFICANT CHANGE UP (ref 17–32)
CO2 SERPL-SCNC: 21 MMOL/L — SIGNIFICANT CHANGE UP (ref 17–32)
CO2 SERPL-SCNC: 21 MMOL/L — SIGNIFICANT CHANGE UP (ref 17–32)
CREAT SERPL-MCNC: 2.7 MG/DL — HIGH (ref 0.7–1.5)
EGFR: 22 ML/MIN/1.73M2 — LOW
GLUCOSE BLDC GLUCOMTR-MCNC: 147 MG/DL — HIGH (ref 70–99)
GLUCOSE BLDC GLUCOMTR-MCNC: 147 MG/DL — HIGH (ref 70–99)
GLUCOSE BLDC GLUCOMTR-MCNC: 181 MG/DL — HIGH (ref 70–99)
GLUCOSE BLDC GLUCOMTR-MCNC: 181 MG/DL — HIGH (ref 70–99)
GLUCOSE BLDC GLUCOMTR-MCNC: 249 MG/DL — HIGH (ref 70–99)
GLUCOSE BLDC GLUCOMTR-MCNC: 249 MG/DL — HIGH (ref 70–99)
GLUCOSE SERPL-MCNC: 159 MG/DL — HIGH (ref 70–99)
GLUCOSE SERPL-MCNC: 159 MG/DL — HIGH (ref 70–99)
GLUCOSE SERPL-MCNC: 185 MG/DL — HIGH (ref 70–99)
GLUCOSE SERPL-MCNC: 185 MG/DL — HIGH (ref 70–99)
HDLC SERPL-MCNC: 36 MG/DL — LOW
HDLC SERPL-MCNC: 36 MG/DL — LOW
LIPID PNL WITH DIRECT LDL SERPL: 53 MG/DL — SIGNIFICANT CHANGE UP
LIPID PNL WITH DIRECT LDL SERPL: 53 MG/DL — SIGNIFICANT CHANGE UP
MAGNESIUM SERPL-MCNC: 2.9 MG/DL — HIGH (ref 1.8–2.4)
MAGNESIUM SERPL-MCNC: 2.9 MG/DL — HIGH (ref 1.8–2.4)
NON HDL CHOLESTEROL: 78 MG/DL — SIGNIFICANT CHANGE UP
NON HDL CHOLESTEROL: 78 MG/DL — SIGNIFICANT CHANGE UP
NT-PROBNP SERPL-SCNC: HIGH PG/ML (ref 0–300)
NT-PROBNP SERPL-SCNC: HIGH PG/ML (ref 0–300)
POTASSIUM SERPL-MCNC: 4.9 MMOL/L — SIGNIFICANT CHANGE UP (ref 3.5–5)
POTASSIUM SERPL-MCNC: 4.9 MMOL/L — SIGNIFICANT CHANGE UP (ref 3.5–5)
POTASSIUM SERPL-MCNC: 6.1 MMOL/L — CRITICAL HIGH (ref 3.5–5)
POTASSIUM SERPL-MCNC: 6.1 MMOL/L — CRITICAL HIGH (ref 3.5–5)
POTASSIUM SERPL-SCNC: 4.9 MMOL/L — SIGNIFICANT CHANGE UP (ref 3.5–5)
POTASSIUM SERPL-SCNC: 4.9 MMOL/L — SIGNIFICANT CHANGE UP (ref 3.5–5)
POTASSIUM SERPL-SCNC: 6.1 MMOL/L — CRITICAL HIGH (ref 3.5–5)
POTASSIUM SERPL-SCNC: 6.1 MMOL/L — CRITICAL HIGH (ref 3.5–5)
SODIUM SERPL-SCNC: 130 MMOL/L — LOW (ref 135–146)
SODIUM SERPL-SCNC: 130 MMOL/L — LOW (ref 135–146)
SODIUM SERPL-SCNC: 131 MMOL/L — LOW (ref 135–146)
SODIUM SERPL-SCNC: 131 MMOL/L — LOW (ref 135–146)
TRIGL SERPL-MCNC: 123 MG/DL — SIGNIFICANT CHANGE UP
TRIGL SERPL-MCNC: 123 MG/DL — SIGNIFICANT CHANGE UP

## 2023-12-31 PROCEDURE — 99233 SBSQ HOSP IP/OBS HIGH 50: CPT

## 2023-12-31 RX ORDER — FUROSEMIDE 40 MG
20 TABLET ORAL ONCE
Refills: 0 | Status: COMPLETED | OUTPATIENT
Start: 2023-12-31 | End: 2023-12-31

## 2023-12-31 RX ORDER — APIXABAN 2.5 MG/1
2.5 TABLET, FILM COATED ORAL EVERY 12 HOURS
Refills: 0 | Status: DISCONTINUED | OUTPATIENT
Start: 2024-01-01 | End: 2024-01-06

## 2023-12-31 RX ADMIN — ATORVASTATIN CALCIUM 40 MILLIGRAM(S): 80 TABLET, FILM COATED ORAL at 21:45

## 2023-12-31 RX ADMIN — CHLORHEXIDINE GLUCONATE 1 APPLICATION(S): 213 SOLUTION TOPICAL at 05:26

## 2023-12-31 RX ADMIN — Medication 1: at 17:22

## 2023-12-31 RX ADMIN — Medication 500 MILLIGRAM(S): at 17:22

## 2023-12-31 RX ADMIN — Medication 20 MILLIGRAM(S): at 15:09

## 2023-12-31 RX ADMIN — ISOSORBIDE DINITRATE 5 MILLIGRAM(S): 5 TABLET ORAL at 05:26

## 2023-12-31 RX ADMIN — Medication 10 MILLIGRAM(S): at 13:54

## 2023-12-31 RX ADMIN — TAMSULOSIN HYDROCHLORIDE 0.4 MILLIGRAM(S): 0.4 CAPSULE ORAL at 21:45

## 2023-12-31 RX ADMIN — GABAPENTIN 300 MILLIGRAM(S): 400 CAPSULE ORAL at 13:54

## 2023-12-31 RX ADMIN — Medication 10 MILLIGRAM(S): at 05:26

## 2023-12-31 RX ADMIN — ISOSORBIDE DINITRATE 5 MILLIGRAM(S): 5 TABLET ORAL at 13:54

## 2023-12-31 RX ADMIN — ISOSORBIDE DINITRATE 5 MILLIGRAM(S): 5 TABLET ORAL at 17:22

## 2023-12-31 RX ADMIN — SENNA PLUS 2 TABLET(S): 8.6 TABLET ORAL at 21:45

## 2023-12-31 RX ADMIN — Medication 500 MILLIGRAM(S): at 05:26

## 2023-12-31 RX ADMIN — Medication 81 MILLIGRAM(S): at 13:54

## 2023-12-31 RX ADMIN — Medication 10 MILLIGRAM(S): at 21:45

## 2023-12-31 RX ADMIN — Medication 50 MILLIGRAM(S): at 05:26

## 2023-12-31 NOTE — PROGRESS NOTE ADULT - ASSESSMENT
89M, hx of DM, HTN, HLD, CAD sp CABG (years ago), hypothyroidism, brought in by daughter to ED with 1 day of chest pain and 2 weeks of bilateral LE "heaviness" described as soreness and has not been ambulating normally since then. In the ED , found to have slow afib and high degree AVB. Sustained brief cardiac arrest in ED, achieved ROSC and was admitted to CCU. Had TVP placed. On 12/29th, he underwent Biv ICD implant by Dr. Lopez. He was transferred to  post implantation.     Impression and Plan:    # Atrial fibrillation  #. High-degree AV block / transient CHB  Transient CHB with brief  in-hospital cardiac arrest  s/p TVP in CCU  ECHO Severe LV dysfunction. EF 31%. Severe pulmonary hypertension (PASP = 66mmHg). The IVC is dilated and non-collapsing, indicating increased right atrial pressure.  -  SP BiV ICD Implant POD#1. CXR done with no pneumothorax.  Interrogation completed, numbers within appropriate range  - cont on metoprolol 25mg BID, plan to switch to Toprol 50mg 12/31  - No anticoagulation 48 hours postop   - complete x3 doses of Ancef postop. Start on 12/31 Keflex 500mg BID x 5 days  - Post ICD implant education provided to both daughter and patient at bedside  - EP following   - continue monitoring on cardiology telemetry unit  - Start anticoagulation per EP        # CAD s/p CABG  # NSTEMI (possibly type II secondary to cardiac arrest / underlying CAD)  #ICM (severe dysfunction)-->new   #HTN  on admission troponin 4152  - cont BB , cont on ASA 81MG daily  - Start on isordil 5mg TID  - received lasix 20mg IV x2 12/30,  diuretic need base don urine output ( lasix 20mg po at home) , continue IV lasix daily   - HOLD on starting ACEi/ARB d/t MAUREEN ( hold home dose of benazepril 20mg ) -->may restart when renal fx stable   - possible cath for ischemic w/u when renal function improve and clinical improvement     #MAUREEN  - Cr as high as 2.4, adm with 1.5  - Avoid nephrotoxins  -monitor BUN/Cr      # Right lung mass possibly neoplastic note don CT scan   - CT angio 12/28 Right lung spiculated mass and additional upper lobe lesion, as described. Findings are concerning for neoplastic process.  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 diabetic neuropathy  A1C 7.1   - Cont on ISS while inpatient ( hold home PO diabetic meds for now d/t MAUREEN )   - F.S ACHS   - cont on gabapentin 300mg po daily     #HLD  - cont on atorvastatin 40mg po  -  f/u lipid panel in Am     # BPH  - Cont on tamsulosin 0.4mg  - strict I & O    miscellaneous  CODE STATUS: Full code  diet: dash , carb consistent  DVT prophylaxis: currently anticoagulant on HOLD x 48hrs post ICD implant          89M, hx of DM, HTN, HLD, CAD sp CABG (years ago), hypothyroidism, brought in by daughter to ED with 1 day of chest pain and 2 weeks of bilateral LE "heaviness" described as soreness and has not been ambulating normally since then. In the ED , found to have slow afib and high degree AVB. Sustained brief cardiac arrest in ED, achieved ROSC and was admitted to CCU. Had TVP placed. On 12/29th, he underwent Biv ICD implant by Dr. Lopez. He was transferred to  post implantation.     Impression and Plan:    # Atrial fibrillation  #. High-degree AV block / transient CHB  Transient CHB with brief  in-hospital cardiac arrest  s/p TVP in CCU  ECHO Severe LV dysfunction. EF 31%. Severe pulmonary hypertension (PASP = 66mmHg). The IVC is dilated and non-collapsing, indicating increased right atrial pressure.  -  SP BiV ICD Implant POD#1. CXR done with no pneumothorax.  Interrogation completed, numbers within appropriate range  - cont on metoprolol 25mg BID, plan to switch to Toprol 50mg 12/31  - No anticoagulation 48 hours postop   - complete x3 doses of Ancef postop. Start on 12/31 Keflex 500mg BID x 5 days  - Post ICD implant education provided to both daughter and patient at bedside  - EP following   - continue monitoring on cardiology telemetry unit  - CXR 12/30 worsened pulmonary congestion  - Eliuis 2.5 bid ordered for 1/1 evening (s/p 48 hrs after ICD)    # CAD s/p CABG  # NSTEMI (possibly type II secondary to cardiac arrest / underlying CAD)  #ICM (severe dysfunction)-->new   #HTN  on admission troponin 4152  - cont BB , cont on ASA 81MG daily  - Start on isordil 5mg TID  - received lasix 20mg IV x2 12/30,  diuretic need based on  urine output ( lasix 20mg po at home) , lasix 20 mg 12/31, continue IV lasix daily   - HOLD on starting ACEi/ARB d/t MAUREEN ( hold home dose of benazepril 20mg ) -->may restart when renal fx stable   - possible cath for ischemic w/u when renal function improve and clinical improvement     #MAUREEN  - Cr as high as 2.4, adm with 1.5  - Avoid nephrotoxins  -monitor BUN/Cr      # Right lung mass possibly neoplastic note don CT scan   - CT angio 12/28 Right lung spiculated mass and additional upper lobe lesion, as described. Findings are concerning for neoplastic process.  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 diabetic neuropathy  A1C 7.1   - Cont on ISS while inpatient ( hold home PO diabetic meds for now d/t MAUREEN )   - F.S ACHS   - cont on gabapentin 300mg po daily     #HLD  - cont on atorvastatin 40mg po  -  f/u lipid panel in Am     # BPH  - Cont on tamsulosin 0.4mg  - strict I & O    miscellaneous  CODE STATUS: Full code  diet: dash , carb consistent  DVT prophylaxis: currently anticoagulant on HOLD x 48hrs post ICD implant

## 2023-12-31 NOTE — PROGRESS NOTE ADULT - SUBJECTIVE AND OBJECTIVE BOX
Chief complaint: Patient is a 89y old  Male who presents with a chief complaint of Cardiac Arrest (30 Dec 2023 12:13)    Interval history:  patient seen and examined at bedside, lying down comfortably, denies complaints, states "I am okay", denies CP, SOB, palpitations, dizziness, difficulty urinating  patient refuses to be examined , reports "I want to rest today"  No overnight events   Cr 2.7 today AM labs, K 6.1 (hemolyzed)    Past medical history is notable for: DM, HTN, HLD, CAD sp CABG    Review of systems: a complete 10- point review of systems was obtained and is negative except as stated in the interval history.    Vitals:  Vital Signs Last 24 Hrs  T(C): 36.1 (31 Dec 2023 08:42), Max: 36.8 (30 Dec 2023 12:00)  T(F): 97 (31 Dec 2023 08:42), Max: 98.2 (30 Dec 2023 12:00)  HR: 69 (31 Dec 2023 08:42) (69 - 70)  BP: 145/81 (31 Dec 2023 08:42) (130/71 - 145/92)  BP(mean): 105 (31 Dec 2023 08:42) (94 - 114)  RR: 19 (31 Dec 2023 08:42) (15 - 20)  SpO2: 95% (31 Dec 2023 08:42) (93% - 96%)    Parameters below as of 31 Dec 2023 08:42  Patient On (Oxygen Delivery Method): room air        Ins & outs:     12-28 @ 07:01  -  12-29 @ 07:00  --------------------------------------------------------  IN: 274 mL / OUT: 500 mL / NET: -226 mL    12-29 @ 07:01  -  12-30 @ 07:00  --------------------------------------------------------  IN: 39 mL / OUT: 500 mL / NET: -461 mL    I&O's Summary    30 Dec 2023 07:01  -  31 Dec 2023 07:00  --------------------------------------------------------  IN: 270 mL / OUT: 600 mL / NET: -330 mL          Weight trend:  Weight (kg): 72.575 (12-29)    Physical exam:  Pt refusing to be examined at this time     Data reviewed:  - Telemetry: V-paced on tele, HR 70's ?underlying afib     - ECG < from: 12 Lead ECG (12.28.23 @ 15:05) >  Diagnosis Line Atrial fibrillation with slow ventricular response  Right axis deviation  Left bundle branch block  Abnormal ECG        - Echo < from: TTE Echo Complete w/o Contrast w/ Doppler (12.29.23 @ 09:30) >  Summary:   1. Severely decreased global left ventricular systolic function with a   biplane EF of 31%. Mild eccentric left ventricular hypertrophy. Diastolic   function is indeterminate.   2. Normal right ventricular size with moderately reduced systolic   function.   3. Mildly enlarged left atrium.   4. Normalright atrial size.   5. Mild to moderate mitral valve regurgitation.   6. Sclerotic aortic valve with decreased opening. Focal calcification   along the left coronary cusp.   7. Mild-moderate tricuspid regurgitation.   8. Mild pulmonic valve regurgitation.   9. Severe pulmonary hypertension (PASP = 66mmHg). The IVC is dilated and   non-collapsing, indicating increased right atrial pressure.  10. There is no evidence of pericardial effusion.      - Radiology:   Xray Chest 2 Views PA/Lat (12.30.23 @ 08:43) >  Impression:    Bilateral opacities, left basilar focal atelectasis and cardiomegaly,   unchanged.    CT angio chest ct abdomen pelvis No aortic intramural hematoma, aneurysm or focal dissection. 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 Cholelithiasis and small pericholecystic inflammatory changes, may  represent acute cholecystitis.   Right lung spiculated mass and additional upper lobe lesion, as   described. Findings are concerning for neoplastic process.               - Labs:                        11.5   9.47  )-----------( 135      ( 30 Dec 2023 05:21 )             36.5     12-31    130<L>  |  98  |  62<HH>  ----------------------------<  159<H>  6.1<HH>   |  18  |  2.7<H>    Ca    9.2      31 Dec 2023 06:13  Mg     2.9     12-31    TPro  6.5  /  Alb  4.0  /  TBili  1.3<H>  /  DBili  x   /  AST  204<H>  /  ALT  80<H>  /  AlkPhos  174<H>  12-30    LIVER FUNCTIONS - ( 30 Dec 2023 05:21 )  Alb: 4.0 g/dL / Pro: 6.5 g/dL / ALK PHOS: 174 U/L / ALT: 80 U/L / AST: 204 U/L / GGT: x           PTT - ( 29 Dec 2023 13:05 )  PTT:39.2 sec     Cultures:    Medications:  MEDICATIONS  (STANDING):  aspirin  chewable 81 milliGRAM(s) Oral daily  atorvastatin 40 milliGRAM(s) Oral at bedtime  cephalexin 500 milliGRAM(s) Oral every 12 hours  chlorhexidine 2% Cloths 1 Application(s) Topical <User Schedule>  dextrose 5%. 1000 milliLiter(s) (100 mL/Hr) IV Continuous <Continuous>  dextrose 5%. 1000 milliLiter(s) (50 mL/Hr) IV Continuous <Continuous>  dextrose 50% Injectable 25 Gram(s) IV Push once  dextrose 50% Injectable 12.5 Gram(s) IV Push once  dextrose 50% Injectable 25 Gram(s) IV Push once  gabapentin 300 milliGRAM(s) Oral daily  glucagon  Injectable 1 milliGRAM(s) IntraMuscular once  hydrALAZINE 10 milliGRAM(s) Oral three times a day  influenza  Vaccine (HIGH DOSE) 0.7 milliLiter(s) IntraMuscular once  insulin lispro (ADMELOG) corrective regimen sliding scale   SubCutaneous three times a day before meals  isosorbide   dinitrate Tablet (ISORDIL) 5 milliGRAM(s) Oral three times a day  metoprolol succinate ER 50 milliGRAM(s) Oral daily  senna 2 Tablet(s) Oral at bedtime  tamsulosin 0.4 milliGRAM(s) Oral at bedtime    MEDICATIONS  (PRN):  dextrose Oral Gel 15 Gram(s) Oral once PRN Blood Glucose LESS THAN 70 milliGRAM(s)/deciliter

## 2024-01-01 LAB
ANION GAP SERPL CALC-SCNC: 16 MMOL/L — HIGH (ref 7–14)
ANION GAP SERPL CALC-SCNC: 16 MMOL/L — HIGH (ref 7–14)
BASOPHILS # BLD AUTO: 0.02 K/UL — SIGNIFICANT CHANGE UP (ref 0–0.2)
BASOPHILS # BLD AUTO: 0.02 K/UL — SIGNIFICANT CHANGE UP (ref 0–0.2)
BASOPHILS NFR BLD AUTO: 0.2 % — SIGNIFICANT CHANGE UP (ref 0–1)
BASOPHILS NFR BLD AUTO: 0.2 % — SIGNIFICANT CHANGE UP (ref 0–1)
BUN SERPL-MCNC: 72 MG/DL — CRITICAL HIGH (ref 10–20)
BUN SERPL-MCNC: 72 MG/DL — CRITICAL HIGH (ref 10–20)
CALCIUM SERPL-MCNC: 8.9 MG/DL — SIGNIFICANT CHANGE UP (ref 8.4–10.5)
CALCIUM SERPL-MCNC: 8.9 MG/DL — SIGNIFICANT CHANGE UP (ref 8.4–10.5)
CHLORIDE SERPL-SCNC: 101 MMOL/L — SIGNIFICANT CHANGE UP (ref 98–110)
CHLORIDE SERPL-SCNC: 101 MMOL/L — SIGNIFICANT CHANGE UP (ref 98–110)
CO2 SERPL-SCNC: 18 MMOL/L — SIGNIFICANT CHANGE UP (ref 17–32)
CO2 SERPL-SCNC: 18 MMOL/L — SIGNIFICANT CHANGE UP (ref 17–32)
CREAT SERPL-MCNC: 3.1 MG/DL — HIGH (ref 0.7–1.5)
CREAT SERPL-MCNC: 3.1 MG/DL — HIGH (ref 0.7–1.5)
EGFR: 18 ML/MIN/1.73M2 — LOW
EGFR: 18 ML/MIN/1.73M2 — LOW
EOSINOPHIL # BLD AUTO: 0.09 K/UL — SIGNIFICANT CHANGE UP (ref 0–0.7)
EOSINOPHIL # BLD AUTO: 0.09 K/UL — SIGNIFICANT CHANGE UP (ref 0–0.7)
EOSINOPHIL NFR BLD AUTO: 0.8 % — SIGNIFICANT CHANGE UP (ref 0–8)
EOSINOPHIL NFR BLD AUTO: 0.8 % — SIGNIFICANT CHANGE UP (ref 0–8)
GLUCOSE BLDC GLUCOMTR-MCNC: 191 MG/DL — HIGH (ref 70–99)
GLUCOSE BLDC GLUCOMTR-MCNC: 191 MG/DL — HIGH (ref 70–99)
GLUCOSE BLDC GLUCOMTR-MCNC: 193 MG/DL — HIGH (ref 70–99)
GLUCOSE BLDC GLUCOMTR-MCNC: 193 MG/DL — HIGH (ref 70–99)
GLUCOSE BLDC GLUCOMTR-MCNC: 202 MG/DL — HIGH (ref 70–99)
GLUCOSE BLDC GLUCOMTR-MCNC: 202 MG/DL — HIGH (ref 70–99)
GLUCOSE BLDC GLUCOMTR-MCNC: 219 MG/DL — HIGH (ref 70–99)
GLUCOSE BLDC GLUCOMTR-MCNC: 219 MG/DL — HIGH (ref 70–99)
GLUCOSE SERPL-MCNC: 165 MG/DL — HIGH (ref 70–99)
GLUCOSE SERPL-MCNC: 165 MG/DL — HIGH (ref 70–99)
HCT VFR BLD CALC: 34.5 % — LOW (ref 42–52)
HCT VFR BLD CALC: 34.5 % — LOW (ref 42–52)
HGB BLD-MCNC: 11.2 G/DL — LOW (ref 14–18)
HGB BLD-MCNC: 11.2 G/DL — LOW (ref 14–18)
IMM GRANULOCYTES NFR BLD AUTO: 0.5 % — HIGH (ref 0.1–0.3)
IMM GRANULOCYTES NFR BLD AUTO: 0.5 % — HIGH (ref 0.1–0.3)
LACTATE SERPL-SCNC: 1.1 MMOL/L — SIGNIFICANT CHANGE UP (ref 0.7–2)
LACTATE SERPL-SCNC: 1.1 MMOL/L — SIGNIFICANT CHANGE UP (ref 0.7–2)
LYMPHOCYTES # BLD AUTO: 2.43 K/UL — SIGNIFICANT CHANGE UP (ref 1.2–3.4)
LYMPHOCYTES # BLD AUTO: 2.43 K/UL — SIGNIFICANT CHANGE UP (ref 1.2–3.4)
LYMPHOCYTES # BLD AUTO: 21.5 % — SIGNIFICANT CHANGE UP (ref 20.5–51.1)
LYMPHOCYTES # BLD AUTO: 21.5 % — SIGNIFICANT CHANGE UP (ref 20.5–51.1)
MAGNESIUM SERPL-MCNC: 2.9 MG/DL — HIGH (ref 1.8–2.4)
MAGNESIUM SERPL-MCNC: 2.9 MG/DL — HIGH (ref 1.8–2.4)
MCHC RBC-ENTMCNC: 30.2 PG — SIGNIFICANT CHANGE UP (ref 27–31)
MCHC RBC-ENTMCNC: 30.2 PG — SIGNIFICANT CHANGE UP (ref 27–31)
MCHC RBC-ENTMCNC: 32.5 G/DL — SIGNIFICANT CHANGE UP (ref 32–37)
MCHC RBC-ENTMCNC: 32.5 G/DL — SIGNIFICANT CHANGE UP (ref 32–37)
MCV RBC AUTO: 93 FL — SIGNIFICANT CHANGE UP (ref 80–94)
MCV RBC AUTO: 93 FL — SIGNIFICANT CHANGE UP (ref 80–94)
MONOCYTES # BLD AUTO: 1.33 K/UL — HIGH (ref 0.1–0.6)
MONOCYTES # BLD AUTO: 1.33 K/UL — HIGH (ref 0.1–0.6)
MONOCYTES NFR BLD AUTO: 11.7 % — HIGH (ref 1.7–9.3)
MONOCYTES NFR BLD AUTO: 11.7 % — HIGH (ref 1.7–9.3)
NEUTROPHILS # BLD AUTO: 7.39 K/UL — HIGH (ref 1.4–6.5)
NEUTROPHILS # BLD AUTO: 7.39 K/UL — HIGH (ref 1.4–6.5)
NEUTROPHILS NFR BLD AUTO: 65.3 % — SIGNIFICANT CHANGE UP (ref 42.2–75.2)
NEUTROPHILS NFR BLD AUTO: 65.3 % — SIGNIFICANT CHANGE UP (ref 42.2–75.2)
NRBC # BLD: 0 /100 WBCS — SIGNIFICANT CHANGE UP (ref 0–0)
NRBC # BLD: 0 /100 WBCS — SIGNIFICANT CHANGE UP (ref 0–0)
PLATELET # BLD AUTO: 147 K/UL — SIGNIFICANT CHANGE UP (ref 130–400)
PLATELET # BLD AUTO: 147 K/UL — SIGNIFICANT CHANGE UP (ref 130–400)
PMV BLD: 10.6 FL — HIGH (ref 7.4–10.4)
PMV BLD: 10.6 FL — HIGH (ref 7.4–10.4)
POTASSIUM SERPL-MCNC: 4.9 MMOL/L — SIGNIFICANT CHANGE UP (ref 3.5–5)
POTASSIUM SERPL-MCNC: 4.9 MMOL/L — SIGNIFICANT CHANGE UP (ref 3.5–5)
POTASSIUM SERPL-SCNC: 4.9 MMOL/L — SIGNIFICANT CHANGE UP (ref 3.5–5)
POTASSIUM SERPL-SCNC: 4.9 MMOL/L — SIGNIFICANT CHANGE UP (ref 3.5–5)
RBC # BLD: 3.71 M/UL — LOW (ref 4.7–6.1)
RBC # BLD: 3.71 M/UL — LOW (ref 4.7–6.1)
RBC # FLD: 14.4 % — SIGNIFICANT CHANGE UP (ref 11.5–14.5)
RBC # FLD: 14.4 % — SIGNIFICANT CHANGE UP (ref 11.5–14.5)
SODIUM SERPL-SCNC: 135 MMOL/L — SIGNIFICANT CHANGE UP (ref 135–146)
SODIUM SERPL-SCNC: 135 MMOL/L — SIGNIFICANT CHANGE UP (ref 135–146)
WBC # BLD: 11.32 K/UL — HIGH (ref 4.8–10.8)
WBC # BLD: 11.32 K/UL — HIGH (ref 4.8–10.8)
WBC # FLD AUTO: 11.32 K/UL — HIGH (ref 4.8–10.8)
WBC # FLD AUTO: 11.32 K/UL — HIGH (ref 4.8–10.8)

## 2024-01-01 PROCEDURE — 71045 X-RAY EXAM CHEST 1 VIEW: CPT | Mod: 26

## 2024-01-01 PROCEDURE — 99233 SBSQ HOSP IP/OBS HIGH 50: CPT

## 2024-01-01 RX ADMIN — Medication 2: at 08:12

## 2024-01-01 RX ADMIN — Medication 1: at 11:50

## 2024-01-01 RX ADMIN — Medication 50 MILLIGRAM(S): at 05:43

## 2024-01-01 RX ADMIN — Medication 500 MILLIGRAM(S): at 17:03

## 2024-01-01 RX ADMIN — Medication 10 MILLIGRAM(S): at 05:42

## 2024-01-01 RX ADMIN — Medication 10 MILLIGRAM(S): at 22:21

## 2024-01-01 RX ADMIN — APIXABAN 2.5 MILLIGRAM(S): 2.5 TABLET, FILM COATED ORAL at 22:21

## 2024-01-01 RX ADMIN — Medication 1: at 17:18

## 2024-01-01 RX ADMIN — ISOSORBIDE DINITRATE 5 MILLIGRAM(S): 5 TABLET ORAL at 05:43

## 2024-01-01 RX ADMIN — ATORVASTATIN CALCIUM 40 MILLIGRAM(S): 80 TABLET, FILM COATED ORAL at 22:21

## 2024-01-01 RX ADMIN — GABAPENTIN 300 MILLIGRAM(S): 400 CAPSULE ORAL at 11:11

## 2024-01-01 RX ADMIN — TAMSULOSIN HYDROCHLORIDE 0.4 MILLIGRAM(S): 0.4 CAPSULE ORAL at 22:21

## 2024-01-01 RX ADMIN — ISOSORBIDE DINITRATE 5 MILLIGRAM(S): 5 TABLET ORAL at 17:03

## 2024-01-01 RX ADMIN — Medication 81 MILLIGRAM(S): at 11:11

## 2024-01-01 RX ADMIN — ISOSORBIDE DINITRATE 5 MILLIGRAM(S): 5 TABLET ORAL at 11:11

## 2024-01-01 RX ADMIN — Medication 500 MILLIGRAM(S): at 05:42

## 2024-01-01 RX ADMIN — CHLORHEXIDINE GLUCONATE 1 APPLICATION(S): 213 SOLUTION TOPICAL at 05:40

## 2024-01-01 NOTE — PHYSICAL THERAPY INITIAL EVALUATION ADULT - PERTINENT HX OF CURRENT PROBLEM, REHAB EVAL
89M, hx of DM, HTN, HLD, CAD, CABG, hypothyroidism, presented to the ED with 1 day of chest pain and 2 weeks of bilateral LE pain, he states his legs feel heavy, described as soreness and has not been ambulating normally since then. In the ED he was found to be in complete heart block, went for ct scan and returned pulseless code blue was called patient received 1 epi and awoke during compressions mid -cycle was alert and oriented. Had TVP placed ep called planning for pacemaker

## 2024-01-01 NOTE — PROGRESS NOTE ADULT - ASSESSMENT
89M, hx of DM, HTN, HLD, CAD sp CABG (years ago), hypothyroidism, brought in by daughter to ED with 1 day of chest pain and 2 weeks of bilateral LE "heaviness" described as soreness and has not been ambulating normally since then. In the ED , found to have slow afib and high degree AVB. Sustained brief cardiac arrest in ED, achieved ROSC and was admitted to CCU. Had TVP placed. On 12/29th, he underwent Biv ICD implant by Dr. Lopez. He was transferred to  post implantation.     Impression and Plan:    # Atrial fibrillation  #. High-degree AV block / transient CHB  Transient CHB with brief  in-hospital cardiac arrest  s/p TVP in CCU  ECHO Severe LV dysfunction. EF 31%. Severe pulmonary hypertension (PASP = 66mmHg). The IVC is dilated and non-collapsing, indicating increased right atrial pressure.  -  SP BiV ICD Implant POD#1. CXR done with no pneumothorax.  Interrogation completed, numbers within appropriate range  - cont on metoprolol 25mg BID, plan to switch to Toprol 50mg 12/31  - Resume Eliquis tonight  - Started on 12/31 Keflex 500mg BID x 5 days  - continue monitoring on cardiology telemetry unit  - CXR 12/30 worsened pulmonary congestion  - Eliuis 2.5 bid ordered for 1/1 evening (s/p 48 hrs after ICD)    # CAD s/p CABG  # NSTEMI (possibly type II secondary to cardiac arrest / underlying CAD)  #ICM (severe dysfunction)-->new   #HTN  on admission troponin 4152  - cont BB , cont on ASA 81MG daily  - Start on isordil 5mg TID  - Cont IV Lasix but check lactate, IVC and CXR today  - HOLD on starting ACEi/ARB d/t MAUREEN ( hold home dose of benazepril 20mg ) -->may restart when renal fx stable   - possible cath for ischemic w/u when renal function improve and clinical improvement     #MAUREEN  - Cr now as high 3.1  - Avoid nephrotoxins  -monitor BUN/Cr      # Right lung mass possibly neoplastic note don CT scan   - CT angio 12/28 Right lung spiculated mass and additional upper lobe lesion, as described. Findings are concerning for neoplastic process.  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 diabetic neuropathy  A1C 7.1   - Cont on ISS while inpatient ( hold home PO diabetic meds for now d/t MAUREEN )   - F.S ACHS   - cont on gabapentin 300mg po daily     #HLD  - cont on atorvastatin 40mg po  -  f/u lipid panel in Am     # BPH  - Cont on tamsulosin 0.4mg  - strict I & O    miscellaneous  CODE STATUS: Full code  diet: dash , carb consistent  DVT prophylaxis: Eliquis 2.5 to resume 1/1 pm

## 2024-01-01 NOTE — PROGRESS NOTE ADULT - SUBJECTIVE AND OBJECTIVE BOX
Chief complaint: Patient is a 89y old  Male who presents with a chief complaint of Cardiac Arrest (30 Dec 2023 12:13)    Interval history:  Breathing and LE edema improving  Creat Rising to 3.1    Past medical history is notable for: DM, HTN, HLD, CAD sp CABG    Review of systems: a complete 10- point review of systems was obtained and is negative except as stated in the interval history.    Vitals:  Vital Signs Last 24 Hrs  T(C): 36.1 (31 Dec 2023 08:42), Max: 36.8 (30 Dec 2023 12:00)  T(F): 97 (31 Dec 2023 08:42), Max: 98.2 (30 Dec 2023 12:00)  HR: 69 (31 Dec 2023 08:42) (69 - 70)  BP: 145/81 (31 Dec 2023 08:42) (130/71 - 145/92)  BP(mean): 105 (31 Dec 2023 08:42) (94 - 114)  RR: 19 (31 Dec 2023 08:42) (15 - 20)  SpO2: 95% (31 Dec 2023 08:42) (93% - 96%)    Parameters below as of 31 Dec 2023 08:42  Patient On (Oxygen Delivery Method): room air        Ins & outs:     12-28 @ 07:01  -  12-29 @ 07:00  --------------------------------------------------------  IN: 274 mL / OUT: 500 mL / NET: -226 mL    12-29 @ 07:01  -  12-30 @ 07:00  --------------------------------------------------------  IN: 39 mL / OUT: 500 mL / NET: -461 mL    I&O's Summary    30 Dec 2023 07:01  -  31 Dec 2023 07:00  --------------------------------------------------------  IN: 270 mL / OUT: 600 mL / NET: -330 mL          Weight trend:  Weight (kg): 72.575 (12-29)    Physical exam:  VITALS:   T(C): 36.5 (01-01-24 @ 07:45), Max: 37 (01-01-24 @ 00:00)  HR: 69 (01-01-24 @ 07:45) (69 - 69)  BP: 159/88 (01-01-24 @ 07:45) (106/70 - 159/88)  RR: 19 (01-01-24 @ 07:45) (17 - 23)  SpO2: 97% (01-01-24 @ 07:45) (94% - 97%)    GENERAL: NAD, lying in bed comfortably  HEAD:  Atraumatic, normocephalic  EYES: EOMI, PERRLA, conjunctiva and sclera clear  ENT: Moist mucous membranes  NECK: Supple, no JVD  HEART: Regular rate and rhythm, no murmurs, rubs, or gallops  LUNGS: Unlabored respirations.  Clear to auscultation bilaterally, no crackles, wheezing, or rhonchi  ABDOMEN: Soft, nontender, nondistended, +BS  EXTREMITIES: 2+ peripheral pulses bilaterally. No clubbing, cyanosis, or edema  NERVOUS SYSTEM:  A&Ox3, no focal deficits   SKIN: No rashes or lesions    Data reviewed:  - Telemetry: V-paced on tele, HR 70's ?underlying afib     - ECG < from: 12 Lead ECG (12.28.23 @ 15:05) >  Diagnosis Line Atrial fibrillation with slow ventricular response  Right axis deviation  Left bundle branch block  Abnormal ECG        - Echo < from: TTE Echo Complete w/o Contrast w/ Doppler (12.29.23 @ 09:30) >  Summary:   1. Severely decreased global left ventricular systolic function with a   biplane EF of 31%. Mild eccentric left ventricular hypertrophy. Diastolic   function is indeterminate.   2. Normal right ventricular size with moderately reduced systolic   function.   3. Mildly enlarged left atrium.   4. Normalright atrial size.   5. Mild to moderate mitral valve regurgitation.   6. Sclerotic aortic valve with decreased opening. Focal calcification   along the left coronary cusp.   7. Mild-moderate tricuspid regurgitation.   8. Mild pulmonic valve regurgitation.   9. Severe pulmonary hypertension (PASP = 66mmHg). The IVC is dilated and   non-collapsing, indicating increased right atrial pressure.  10. There is no evidence of pericardial effusion.      - Radiology:   Xray Chest 2 Views PA/Lat (12.30.23 @ 08:43) >  Impression:    Bilateral opacities, left basilar focal atelectasis and cardiomegaly,   unchanged.    CT angio chest ct abdomen pelvis No aortic intramural hematoma, aneurysm or focal dissection. 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 Cholelithiasis and small pericholecystic inflammatory changes, may  represent acute cholecystitis.   Right lung spiculated mass and additional upper lobe lesion, as   described. Findings are concerning for neoplastic process.               - Labs:                        11.5   9.47  )-----------( 135      ( 30 Dec 2023 05:21 )             36.5     12-31    130<L>  |  98  |  62<HH>  ----------------------------<  159<H>  6.1<HH>   |  18  |  2.7<H>    Ca    9.2      31 Dec 2023 06:13  Mg     2.9     12-31    TPro  6.5  /  Alb  4.0  /  TBili  1.3<H>  /  DBili  x   /  AST  204<H>  /  ALT  80<H>  /  AlkPhos  174<H>  12-30    LIVER FUNCTIONS - ( 30 Dec 2023 05:21 )  Alb: 4.0 g/dL / Pro: 6.5 g/dL / ALK PHOS: 174 U/L / ALT: 80 U/L / AST: 204 U/L / GGT: x           PTT - ( 29 Dec 2023 13:05 )  PTT:39.2 sec     Cultures:    Medications:  MEDICATIONS  (STANDING):  aspirin  chewable 81 milliGRAM(s) Oral daily  atorvastatin 40 milliGRAM(s) Oral at bedtime  cephalexin 500 milliGRAM(s) Oral every 12 hours  chlorhexidine 2% Cloths 1 Application(s) Topical <User Schedule>  dextrose 5%. 1000 milliLiter(s) (100 mL/Hr) IV Continuous <Continuous>  dextrose 5%. 1000 milliLiter(s) (50 mL/Hr) IV Continuous <Continuous>  dextrose 50% Injectable 25 Gram(s) IV Push once  dextrose 50% Injectable 12.5 Gram(s) IV Push once  dextrose 50% Injectable 25 Gram(s) IV Push once  gabapentin 300 milliGRAM(s) Oral daily  glucagon  Injectable 1 milliGRAM(s) IntraMuscular once  hydrALAZINE 10 milliGRAM(s) Oral three times a day  influenza  Vaccine (HIGH DOSE) 0.7 milliLiter(s) IntraMuscular once  insulin lispro (ADMELOG) corrective regimen sliding scale   SubCutaneous three times a day before meals  isosorbide   dinitrate Tablet (ISORDIL) 5 milliGRAM(s) Oral three times a day  metoprolol succinate ER 50 milliGRAM(s) Oral daily  senna 2 Tablet(s) Oral at bedtime  tamsulosin 0.4 milliGRAM(s) Oral at bedtime    MEDICATIONS  (PRN):  dextrose Oral Gel 15 Gram(s) Oral once PRN Blood Glucose LESS THAN 70 milliGRAM(s)/deciliter

## 2024-01-01 NOTE — PHYSICAL THERAPY INITIAL EVALUATION ADULT - ADDITIONAL COMMENTS
Pt was staying with daughter PTA, will be going to stay at daughter's house after. 3 [facility]Y[employer].   Lives alone.

## 2024-01-02 LAB
ALBUMIN SERPL ELPH-MCNC: 3.4 G/DL — LOW (ref 3.5–5.2)
ALBUMIN SERPL ELPH-MCNC: 3.4 G/DL — LOW (ref 3.5–5.2)
ALP SERPL-CCNC: 188 U/L — HIGH (ref 30–115)
ALP SERPL-CCNC: 188 U/L — HIGH (ref 30–115)
ALT FLD-CCNC: 14 U/L — SIGNIFICANT CHANGE UP (ref 0–41)
ALT FLD-CCNC: 14 U/L — SIGNIFICANT CHANGE UP (ref 0–41)
ANION GAP SERPL CALC-SCNC: 12 MMOL/L — SIGNIFICANT CHANGE UP (ref 7–14)
ANION GAP SERPL CALC-SCNC: 12 MMOL/L — SIGNIFICANT CHANGE UP (ref 7–14)
AST SERPL-CCNC: 40 U/L — SIGNIFICANT CHANGE UP (ref 0–41)
AST SERPL-CCNC: 40 U/L — SIGNIFICANT CHANGE UP (ref 0–41)
BILIRUB DIRECT SERPL-MCNC: 0.2 MG/DL — SIGNIFICANT CHANGE UP (ref 0–0.3)
BILIRUB DIRECT SERPL-MCNC: 0.2 MG/DL — SIGNIFICANT CHANGE UP (ref 0–0.3)
BILIRUB INDIRECT FLD-MCNC: 0.5 MG/DL — SIGNIFICANT CHANGE UP (ref 0.2–1.2)
BILIRUB INDIRECT FLD-MCNC: 0.5 MG/DL — SIGNIFICANT CHANGE UP (ref 0.2–1.2)
BILIRUB SERPL-MCNC: 0.7 MG/DL — SIGNIFICANT CHANGE UP (ref 0.2–1.2)
BILIRUB SERPL-MCNC: 0.7 MG/DL — SIGNIFICANT CHANGE UP (ref 0.2–1.2)
BUN SERPL-MCNC: 79 MG/DL — CRITICAL HIGH (ref 10–20)
BUN SERPL-MCNC: 79 MG/DL — CRITICAL HIGH (ref 10–20)
CALCIUM SERPL-MCNC: 8.5 MG/DL — SIGNIFICANT CHANGE UP (ref 8.4–10.5)
CALCIUM SERPL-MCNC: 8.5 MG/DL — SIGNIFICANT CHANGE UP (ref 8.4–10.5)
CHLORIDE SERPL-SCNC: 102 MMOL/L — SIGNIFICANT CHANGE UP (ref 98–110)
CHLORIDE SERPL-SCNC: 102 MMOL/L — SIGNIFICANT CHANGE UP (ref 98–110)
CO2 SERPL-SCNC: 17 MMOL/L — SIGNIFICANT CHANGE UP (ref 17–32)
CO2 SERPL-SCNC: 17 MMOL/L — SIGNIFICANT CHANGE UP (ref 17–32)
CREAT SERPL-MCNC: 2.9 MG/DL — HIGH (ref 0.7–1.5)
CREAT SERPL-MCNC: 2.9 MG/DL — HIGH (ref 0.7–1.5)
EGFR: 20 ML/MIN/1.73M2 — LOW
EGFR: 20 ML/MIN/1.73M2 — LOW
GLUCOSE BLDC GLUCOMTR-MCNC: 161 MG/DL — HIGH (ref 70–99)
GLUCOSE BLDC GLUCOMTR-MCNC: 161 MG/DL — HIGH (ref 70–99)
GLUCOSE BLDC GLUCOMTR-MCNC: 180 MG/DL — HIGH (ref 70–99)
GLUCOSE BLDC GLUCOMTR-MCNC: 180 MG/DL — HIGH (ref 70–99)
GLUCOSE BLDC GLUCOMTR-MCNC: 209 MG/DL — HIGH (ref 70–99)
GLUCOSE BLDC GLUCOMTR-MCNC: 209 MG/DL — HIGH (ref 70–99)
GLUCOSE BLDC GLUCOMTR-MCNC: 227 MG/DL — HIGH (ref 70–99)
GLUCOSE BLDC GLUCOMTR-MCNC: 227 MG/DL — HIGH (ref 70–99)
GLUCOSE SERPL-MCNC: 172 MG/DL — HIGH (ref 70–99)
GLUCOSE SERPL-MCNC: 172 MG/DL — HIGH (ref 70–99)
HCT VFR BLD CALC: 33.5 % — LOW (ref 42–52)
HCT VFR BLD CALC: 33.5 % — LOW (ref 42–52)
HGB BLD-MCNC: 10.7 G/DL — LOW (ref 14–18)
HGB BLD-MCNC: 10.7 G/DL — LOW (ref 14–18)
MAGNESIUM SERPL-MCNC: 3 MG/DL — HIGH (ref 1.8–2.4)
MAGNESIUM SERPL-MCNC: 3 MG/DL — HIGH (ref 1.8–2.4)
MCHC RBC-ENTMCNC: 29.7 PG — SIGNIFICANT CHANGE UP (ref 27–31)
MCHC RBC-ENTMCNC: 29.7 PG — SIGNIFICANT CHANGE UP (ref 27–31)
MCHC RBC-ENTMCNC: 31.9 G/DL — LOW (ref 32–37)
MCHC RBC-ENTMCNC: 31.9 G/DL — LOW (ref 32–37)
MCV RBC AUTO: 93.1 FL — SIGNIFICANT CHANGE UP (ref 80–94)
MCV RBC AUTO: 93.1 FL — SIGNIFICANT CHANGE UP (ref 80–94)
NRBC # BLD: 0 /100 WBCS — SIGNIFICANT CHANGE UP (ref 0–0)
NRBC # BLD: 0 /100 WBCS — SIGNIFICANT CHANGE UP (ref 0–0)
PLATELET # BLD AUTO: 135 K/UL — SIGNIFICANT CHANGE UP (ref 130–400)
PLATELET # BLD AUTO: 135 K/UL — SIGNIFICANT CHANGE UP (ref 130–400)
PMV BLD: 10.1 FL — SIGNIFICANT CHANGE UP (ref 7.4–10.4)
PMV BLD: 10.1 FL — SIGNIFICANT CHANGE UP (ref 7.4–10.4)
POTASSIUM SERPL-MCNC: 4.3 MMOL/L — SIGNIFICANT CHANGE UP (ref 3.5–5)
POTASSIUM SERPL-MCNC: 4.3 MMOL/L — SIGNIFICANT CHANGE UP (ref 3.5–5)
POTASSIUM SERPL-SCNC: 4.3 MMOL/L — SIGNIFICANT CHANGE UP (ref 3.5–5)
POTASSIUM SERPL-SCNC: 4.3 MMOL/L — SIGNIFICANT CHANGE UP (ref 3.5–5)
PROT SERPL-MCNC: 5.8 G/DL — LOW (ref 6–8)
PROT SERPL-MCNC: 5.8 G/DL — LOW (ref 6–8)
RBC # BLD: 3.6 M/UL — LOW (ref 4.7–6.1)
RBC # BLD: 3.6 M/UL — LOW (ref 4.7–6.1)
RBC # FLD: 14.3 % — SIGNIFICANT CHANGE UP (ref 11.5–14.5)
RBC # FLD: 14.3 % — SIGNIFICANT CHANGE UP (ref 11.5–14.5)
SODIUM SERPL-SCNC: 131 MMOL/L — LOW (ref 135–146)
SODIUM SERPL-SCNC: 131 MMOL/L — LOW (ref 135–146)
WBC # BLD: 10.02 K/UL — SIGNIFICANT CHANGE UP (ref 4.8–10.8)
WBC # BLD: 10.02 K/UL — SIGNIFICANT CHANGE UP (ref 4.8–10.8)
WBC # FLD AUTO: 10.02 K/UL — SIGNIFICANT CHANGE UP (ref 4.8–10.8)
WBC # FLD AUTO: 10.02 K/UL — SIGNIFICANT CHANGE UP (ref 4.8–10.8)

## 2024-01-02 PROCEDURE — 93925 LOWER EXTREMITY STUDY: CPT | Mod: 26

## 2024-01-02 PROCEDURE — 71045 X-RAY EXAM CHEST 1 VIEW: CPT | Mod: 26

## 2024-01-02 PROCEDURE — 99232 SBSQ HOSP IP/OBS MODERATE 35: CPT

## 2024-01-02 RX ORDER — FUROSEMIDE 40 MG
40 TABLET ORAL ONCE
Refills: 0 | Status: COMPLETED | OUTPATIENT
Start: 2024-01-02 | End: 2024-01-02

## 2024-01-02 RX ORDER — ACETAMINOPHEN 500 MG
650 TABLET ORAL EVERY 8 HOURS
Refills: 0 | Status: DISCONTINUED | OUTPATIENT
Start: 2024-01-02 | End: 2024-01-09

## 2024-01-02 RX ADMIN — Medication 1: at 08:02

## 2024-01-02 RX ADMIN — Medication 2: at 17:23

## 2024-01-02 RX ADMIN — CHLORHEXIDINE GLUCONATE 1 APPLICATION(S): 213 SOLUTION TOPICAL at 06:03

## 2024-01-02 RX ADMIN — GABAPENTIN 300 MILLIGRAM(S): 400 CAPSULE ORAL at 11:05

## 2024-01-02 RX ADMIN — APIXABAN 2.5 MILLIGRAM(S): 2.5 TABLET, FILM COATED ORAL at 08:09

## 2024-01-02 RX ADMIN — ISOSORBIDE DINITRATE 5 MILLIGRAM(S): 5 TABLET ORAL at 11:05

## 2024-01-02 RX ADMIN — APIXABAN 2.5 MILLIGRAM(S): 2.5 TABLET, FILM COATED ORAL at 21:07

## 2024-01-02 RX ADMIN — ISOSORBIDE DINITRATE 5 MILLIGRAM(S): 5 TABLET ORAL at 06:03

## 2024-01-02 RX ADMIN — Medication 500 MILLIGRAM(S): at 17:23

## 2024-01-02 RX ADMIN — Medication 10 MILLIGRAM(S): at 06:03

## 2024-01-02 RX ADMIN — Medication 2: at 11:43

## 2024-01-02 RX ADMIN — Medication 40 MILLIGRAM(S): at 10:23

## 2024-01-02 RX ADMIN — ISOSORBIDE DINITRATE 5 MILLIGRAM(S): 5 TABLET ORAL at 16:13

## 2024-01-02 RX ADMIN — Medication 500 MILLIGRAM(S): at 06:03

## 2024-01-02 RX ADMIN — Medication 50 MILLIGRAM(S): at 06:03

## 2024-01-02 RX ADMIN — ATORVASTATIN CALCIUM 40 MILLIGRAM(S): 80 TABLET, FILM COATED ORAL at 21:07

## 2024-01-02 RX ADMIN — TAMSULOSIN HYDROCHLORIDE 0.4 MILLIGRAM(S): 0.4 CAPSULE ORAL at 21:06

## 2024-01-02 RX ADMIN — Medication 81 MILLIGRAM(S): at 11:05

## 2024-01-02 RX ADMIN — Medication 10 MILLIGRAM(S): at 13:58

## 2024-01-02 RX ADMIN — Medication 10 MILLIGRAM(S): at 21:06

## 2024-01-02 NOTE — PROGRESS NOTE ADULT - SUBJECTIVE AND OBJECTIVE BOX
Chief complaint: Patient is a 89y old  Male who presents with a chief complaint of Cardiac Arrest (30 Dec 2023 12:13)    Interval history:  patient seen and examined at bedside, denies SOB, chest pain  OOB and ambulated with assistance around floor yesterday   CXR from yesterday still congested   IVC yesterday reported to be 2.1 and not collapsing   Creat 2.9 from 3.1    Past medical history is notable for: DM, HTN, HLD, CAD sp CABG    Review of systems: a complete 10- point review of systems was obtained and is negative except as stated in the interval history.    Vitals:    Vital Signs Last 24 Hrs  T(C): 36.6 (02 Jan 2024 09:00), Max: 36.6 (02 Jan 2024 00:00)  T(F): 97.8 (02 Jan 2024 09:00), Max: 97.9 (02 Jan 2024 00:00)  HR: 69 (02 Jan 2024 12:00) (69 - 69)  BP: 113/56 (02 Jan 2024 12:00) (95/54 - 149/74)  BP(mean): 78 (02 Jan 2024 12:00) (71 - 104)  RR: 20 (02 Jan 2024 12:00) (14 - 20)  SpO2: 98% (02 Jan 2024 12:00) (96% - 98%)    Parameters below as of 02 Jan 2024 12:00  Patient On (Oxygen Delivery Method): room air            Ins & outs:     12-28 @ 07:01  -  12-29 @ 07:00  --------------------------------------------------------  IN: 274 mL / OUT: 500 mL / NET: -226 mL    12-29 @ 07:01  -  12-30 @ 07:00  --------------------------------------------------------  IN: 39 mL / OUT: 500 mL / NET: -461 mL    I&O's Summary    30 Dec 2023 07:01  -  31 Dec 2023 07:00  --------------------------------------------------------  IN: 270 mL / OUT: 600 mL / NET: -330 mL    I&O's Summary    01 Jan 2024 07:01  -  02 Jan 2024 07:00  --------------------------------------------------------  IN: 680 mL / OUT: 850 mL / NET: -170 mL    02 Jan 2024 07:01  -  02 Jan 2024 13:19  --------------------------------------------------------  IN: 0 mL / OUT: 250 mL / NET: -250 mL              Weight trend:  Weight (kg): 72.575 (12-29)    Physical exam:  GENERAL: NAD, lying in bed comfortably  HEAD:  Atraumatic, normocephalic  EYES: EOMI, PERRLA, conjunctiva and sclera clear  ENT: Moist mucous membranes  NECK: Supple, no JVD  HEART: Regular rate and rhythm, no murmurs, rubs, or gallops, L chest ICD site with no swelling/hematoma, steristrips c/d/i  LUNGS: Unlabored respirations.  decreased BS at bases   ABDOMEN: Soft, nontender, nondistended, +BS  EXTREMITIES: 2+ peripheral pulses bilaterally. No clubbing, cyanosis, or edema  NERVOUS SYSTEM:  A&Ox3, no focal deficits   SKIN: No rashes or lesions    Data reviewed:  - Telemetry: V-paced on tele, HR 70's ?underlying afib     - ECG < from: 12 Lead ECG (12.28.23 @ 15:05) >  Diagnosis Line Atrial fibrillation with slow ventricular response  Right axis deviation  Left bundle branch block  Abnormal ECG        - Echo < from: TTE Echo Complete w/o Contrast w/ Doppler (12.29.23 @ 09:30) >  Summary:   1. Severely decreased global left ventricular systolic function with a   biplane EF of 31%. Mild eccentric left ventricular hypertrophy. Diastolic   function is indeterminate.   2. Normal right ventricular size with moderately reduced systolic   function.   3. Mildly enlarged left atrium.   4. Normalright atrial size.   5. Mild to moderate mitral valve regurgitation.   6. Sclerotic aortic valve with decreased opening. Focal calcification   along the left coronary cusp.   7. Mild-moderate tricuspid regurgitation.   8. Mild pulmonic valve regurgitation.   9. Severe pulmonary hypertension (PASP = 66mmHg). The IVC is dilated and   non-collapsing, indicating increased right atrial pressure.  10. There is no evidence of pericardial effusion.      - Radiology:   Xray Chest 2 Views PA/Lat (12.30.23 @ 08:43) >  Impression:    Bilateral opacities, left basilar focal atelectasis and cardiomegaly,   unchanged.    CT angio chest ct abdomen pelvis No aortic intramural hematoma, aneurysm or focal dissection. 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 Cholelithiasis and small pericholecystic inflammatory changes, may  represent acute cholecystitis.   Right lung spiculated mass and additional upper lobe lesion, as   described. Findings are concerning for neoplastic process.                   - Labs:                        10.7   10.02 )-----------( 135      ( 02 Jan 2024 06:46 )             33.5     01-02    131<L>  |  102  |  79<HH>  ----------------------------<  172<H>  4.3   |  17  |  2.9<H>    Ca    8.5      02 Jan 2024 06:46  Mg     3.0     01-02    TPro  5.8<L>  /  Alb  3.4<L>  /  TBili  0.7  /  DBili  0.2  /  AST  40  /  ALT  14  /  AlkPhos  188<H>  01-02    LIVER FUNCTIONS - ( 02 Jan 2024 10:45 )  Alb: 3.4 g/dL / Pro: 5.8 g/dL / ALK PHOS: 188 U/L / ALT: 14 U/L / AST: 40 U/L / GGT: x                     Lactate Trend  01-01 @ 10:25 Lactate:1.1            Cultures:    Medications:  MEDICATIONS  (STANDING):  MEDICATIONS  (STANDING):  apixaban 2.5 milliGRAM(s) Oral every 12 hours  aspirin  chewable 81 milliGRAM(s) Oral daily  atorvastatin 40 milliGRAM(s) Oral at bedtime  cephalexin 500 milliGRAM(s) Oral every 12 hours  chlorhexidine 2% Cloths 1 Application(s) Topical <User Schedule>  dextrose 5%. 1000 milliLiter(s) (100 mL/Hr) IV Continuous <Continuous>  dextrose 5%. 1000 milliLiter(s) (50 mL/Hr) IV Continuous <Continuous>  dextrose 50% Injectable 25 Gram(s) IV Push once  dextrose 50% Injectable 12.5 Gram(s) IV Push once  dextrose 50% Injectable 25 Gram(s) IV Push once  gabapentin 300 milliGRAM(s) Oral daily  glucagon  Injectable 1 milliGRAM(s) IntraMuscular once  hydrALAZINE 10 milliGRAM(s) Oral three times a day  influenza  Vaccine (HIGH DOSE) 0.7 milliLiter(s) IntraMuscular once  insulin lispro (ADMELOG) corrective regimen sliding scale   SubCutaneous three times a day before meals  isosorbide   dinitrate Tablet (ISORDIL) 5 milliGRAM(s) Oral three times a day  metoprolol succinate ER 50 milliGRAM(s) Oral daily  senna 2 Tablet(s) Oral at bedtime  tamsulosin 0.4 milliGRAM(s) Oral at bedtime    MEDICATIONS  (PRN):  dextrose Oral Gel 15 Gram(s) Oral once PRN Blood Glucose LESS THAN 70 milliGRAM(s)/deciliter

## 2024-01-02 NOTE — PROGRESS NOTE ADULT - ASSESSMENT
89M, hx of DM, HTN, HLD, CAD sp CABG (years ago), hypothyroidism, brought in by daughter to ED with 1 day of chest pain and 2 weeks of bilateral LE "heaviness" described as soreness and has not been ambulating normally since then. In the ED , found to have slow afib and high degree AVB. Sustained brief cardiac arrest in ED, achieved ROSC and was admitted to CCU. Had TVP placed. On 12/29th, he underwent Biv ICD implant by Dr. Lopez. He was transferred to  post implantation.     Impression and Plan:    # Atrial fibrillation  #. High-degree AV block / transient CHB  Transient CHB with brief  in-hospital cardiac arrest  s/p TVP in CCU  ECHO Severe LV dysfunction. EF 31%. Severe pulmonary hypertension (PASP = 66mmHg). The IVC is dilated and non-collapsing, indicating increased right atrial pressure.  -  SP BiV ICD Implant POD#1. CXR done with no pneumothorax.  Interrogation completed, numbers within appropriate range  - cont on metoprolol 25mg BID, plan to switch to Toprol 50mg 12/31  - Started on Keflex 500mg BID x 5 days on 12/31   - continue monitoring on cardiology telemetry unit  - CXR 12/30 worsened pulmonary congestion  - Eliquis restarted 1/1 evening  -LFTs were elevated on admission, now improving   -  PT c/s done on 12/31-->Sub-acute Rehab; vs home depending on progress    # CAD s/p CABG  # NSTEMI (possibly type II secondary to cardiac arrest / underlying CAD)  #ICM (severe dysfunction)-->new   #HTN  on admission troponin 4152  - cont BB , cont on ASA 81MG daily  - Start on isordil 5mg TID  - on hydralazine 10 mg tid   -  IV Lasix 40 mg 1/2   - CXR 1/1 still appears congested   - IVC 1/1 reported 2.1  cm and non collapsible   - LA 1.1 1/1  - HOLD on starting ACEi/ARB d/t MAUREEN ( hold home dose of benazepril 20mg ) -->may restart when renal fx stable   - possible cath for ischemic w/u when renal function improve and clinical improvement -->likely as outpatient     #MAUREEN  - Cr now as high 3.1-->2.9  - Avoid nephrotoxins  -monitor BUN/Cr      # Right lung mass possibly neoplastic note don CT scan   - CT angio 12/28 Right lung spiculated mass and additional upper lobe lesion, as described. Findings are concerning for neoplastic process.  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 diabetic neuropathy  A1C 7.1   - Cont on ISS while inpatient ( hold home PO diabetic meds for now d/t MAUREEN )   - F.S ACHS   - cont on gabapentin 300mg po daily     #HLD  - cont on atorvastatin 40mg po  - LDL 53    # BPH  - Cont on tamsulosin 0.4mg  - strict I & O    miscellaneous  CODE STATUS: Full code  diet: dash , carb consistent  DVT prophylaxis: Eliquis       89M, hx of DM, HTN, HLD, CAD sp CABG (years ago), hypothyroidism, brought in by daughter to ED with 1 day of chest pain and 2 weeks of bilateral LE "heaviness" described as soreness and has not been ambulating normally since then. In the ED , found to have slow afib and high degree AVB. Sustained brief cardiac arrest in ED, achieved ROSC and was admitted to CCU. Had TVP placed. On 12/29th, he underwent Biv ICD implant by Dr. Lopez. He was transferred to  post implantation.     Impression and Plan:    # Atrial fibrillation  #. High-degree AV block / transient CHB  Transient CHB with brief  in-hospital cardiac arrest  s/p TVP in CCU  ECHO Severe LV dysfunction. EF 31%. Severe pulmonary hypertension (PASP = 66mmHg). The IVC is dilated and non-collapsing, indicating increased right atrial pressure.  -  SP BiV ICD Implant 12/29. CXR done with no pneumothorax.  Interrogation completed, numbers within appropriate range  - cont on metoprolol 25mg BID, plan to switch to Toprol 50mg 12/31  - Started on Keflex 500mg BID x 5 days on 12/31   - continue monitoring on cardiology telemetry unit  - CXR 12/30 worsened pulmonary congestion  - Eliquis restarted 1/1 evening  -LFTs were elevated on admission, now improving   -  PT c/s done on 12/31-->Sub-acute Rehab; vs home depending on progress    # CAD s/p CABG  # NSTEMI (possibly type II secondary to cardiac arrest / underlying CAD)  #ICM (severe dysfunction)-->new   #HTN  on admission troponin 4152  - cont BB , cont on ASA 81MG daily  - Start on isordil 5mg TID  - on hydralazine 10 mg tid   -  IV Lasix 40 mg 1/2   - CXR 1/1 still appears congested   - IVC 1/1 reported 2.1  cm and non collapsible   - LA 1.1 1/1  - HOLD on starting ACEi/ARB d/t MAUREEN ( hold home dose of benazepril 20mg ) -->may restart when renal fx stable   - possible cath for ischemic w/u when renal function improve and clinical improvement -->likely as outpatient     #MAUREEN  - Cr now as high 3.1-->2.9  - Avoid nephrotoxins  -monitor BUN/Cr      # Right lung mass possibly neoplastic note don CT scan   - CT angio 12/28 Right lung spiculated mass and additional upper lobe lesion, as described. Findings are concerning for neoplastic process.  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 diabetic neuropathy  A1C 7.1   - Cont on ISS while inpatient ( hold home PO diabetic meds for now d/t MAUREEN )   - F.S ACHS   - cont on gabapentin 300mg po daily     #HLD  - cont on atorvastatin 40mg po  - LDL 53    # BPH  - Cont on tamsulosin 0.4mg  - strict I & O    miscellaneous  CODE STATUS: Full code  diet: dash , carb consistent  DVT prophylaxis: Eliquis

## 2024-01-03 LAB
ANION GAP SERPL CALC-SCNC: 13 MMOL/L — SIGNIFICANT CHANGE UP (ref 7–14)
ANION GAP SERPL CALC-SCNC: 13 MMOL/L — SIGNIFICANT CHANGE UP (ref 7–14)
BASOPHILS # BLD AUTO: 0.03 K/UL — SIGNIFICANT CHANGE UP (ref 0–0.2)
BASOPHILS # BLD AUTO: 0.03 K/UL — SIGNIFICANT CHANGE UP (ref 0–0.2)
BASOPHILS NFR BLD AUTO: 0.3 % — SIGNIFICANT CHANGE UP (ref 0–1)
BASOPHILS NFR BLD AUTO: 0.3 % — SIGNIFICANT CHANGE UP (ref 0–1)
BUN SERPL-MCNC: 79 MG/DL — CRITICAL HIGH (ref 10–20)
BUN SERPL-MCNC: 79 MG/DL — CRITICAL HIGH (ref 10–20)
CALCIUM SERPL-MCNC: 8.4 MG/DL — SIGNIFICANT CHANGE UP (ref 8.4–10.5)
CALCIUM SERPL-MCNC: 8.4 MG/DL — SIGNIFICANT CHANGE UP (ref 8.4–10.5)
CHLORIDE SERPL-SCNC: 102 MMOL/L — SIGNIFICANT CHANGE UP (ref 98–110)
CHLORIDE SERPL-SCNC: 102 MMOL/L — SIGNIFICANT CHANGE UP (ref 98–110)
CO2 SERPL-SCNC: 16 MMOL/L — LOW (ref 17–32)
CO2 SERPL-SCNC: 16 MMOL/L — LOW (ref 17–32)
CREAT SERPL-MCNC: 2.5 MG/DL — HIGH (ref 0.7–1.5)
CREAT SERPL-MCNC: 2.5 MG/DL — HIGH (ref 0.7–1.5)
EGFR: 24 ML/MIN/1.73M2 — LOW
EGFR: 24 ML/MIN/1.73M2 — LOW
EOSINOPHIL # BLD AUTO: 0.23 K/UL — SIGNIFICANT CHANGE UP (ref 0–0.7)
EOSINOPHIL # BLD AUTO: 0.23 K/UL — SIGNIFICANT CHANGE UP (ref 0–0.7)
EOSINOPHIL NFR BLD AUTO: 2.4 % — SIGNIFICANT CHANGE UP (ref 0–8)
EOSINOPHIL NFR BLD AUTO: 2.4 % — SIGNIFICANT CHANGE UP (ref 0–8)
FLUAV AG NPH QL: SIGNIFICANT CHANGE UP
FLUAV AG NPH QL: SIGNIFICANT CHANGE UP
FLUBV AG NPH QL: SIGNIFICANT CHANGE UP
FLUBV AG NPH QL: SIGNIFICANT CHANGE UP
GLUCOSE BLDC GLUCOMTR-MCNC: 167 MG/DL — HIGH (ref 70–99)
GLUCOSE BLDC GLUCOMTR-MCNC: 167 MG/DL — HIGH (ref 70–99)
GLUCOSE BLDC GLUCOMTR-MCNC: 179 MG/DL — HIGH (ref 70–99)
GLUCOSE BLDC GLUCOMTR-MCNC: 179 MG/DL — HIGH (ref 70–99)
GLUCOSE BLDC GLUCOMTR-MCNC: 189 MG/DL — HIGH (ref 70–99)
GLUCOSE BLDC GLUCOMTR-MCNC: 189 MG/DL — HIGH (ref 70–99)
GLUCOSE BLDC GLUCOMTR-MCNC: 202 MG/DL — HIGH (ref 70–99)
GLUCOSE BLDC GLUCOMTR-MCNC: 202 MG/DL — HIGH (ref 70–99)
GLUCOSE SERPL-MCNC: 164 MG/DL — HIGH (ref 70–99)
GLUCOSE SERPL-MCNC: 164 MG/DL — HIGH (ref 70–99)
HCT VFR BLD CALC: 31.2 % — LOW (ref 42–52)
HCT VFR BLD CALC: 31.2 % — LOW (ref 42–52)
HGB BLD-MCNC: 10.3 G/DL — LOW (ref 14–18)
HGB BLD-MCNC: 10.3 G/DL — LOW (ref 14–18)
IMM GRANULOCYTES NFR BLD AUTO: 0.3 % — SIGNIFICANT CHANGE UP (ref 0.1–0.3)
IMM GRANULOCYTES NFR BLD AUTO: 0.3 % — SIGNIFICANT CHANGE UP (ref 0.1–0.3)
LYMPHOCYTES # BLD AUTO: 2.97 K/UL — SIGNIFICANT CHANGE UP (ref 1.2–3.4)
LYMPHOCYTES # BLD AUTO: 2.97 K/UL — SIGNIFICANT CHANGE UP (ref 1.2–3.4)
LYMPHOCYTES # BLD AUTO: 31.4 % — SIGNIFICANT CHANGE UP (ref 20.5–51.1)
LYMPHOCYTES # BLD AUTO: 31.4 % — SIGNIFICANT CHANGE UP (ref 20.5–51.1)
MAGNESIUM SERPL-MCNC: 2.5 MG/DL — HIGH (ref 1.8–2.4)
MAGNESIUM SERPL-MCNC: 2.5 MG/DL — HIGH (ref 1.8–2.4)
MCHC RBC-ENTMCNC: 30.3 PG — SIGNIFICANT CHANGE UP (ref 27–31)
MCHC RBC-ENTMCNC: 30.3 PG — SIGNIFICANT CHANGE UP (ref 27–31)
MCHC RBC-ENTMCNC: 33 G/DL — SIGNIFICANT CHANGE UP (ref 32–37)
MCHC RBC-ENTMCNC: 33 G/DL — SIGNIFICANT CHANGE UP (ref 32–37)
MCV RBC AUTO: 91.8 FL — SIGNIFICANT CHANGE UP (ref 80–94)
MCV RBC AUTO: 91.8 FL — SIGNIFICANT CHANGE UP (ref 80–94)
MONOCYTES # BLD AUTO: 0.88 K/UL — HIGH (ref 0.1–0.6)
MONOCYTES # BLD AUTO: 0.88 K/UL — HIGH (ref 0.1–0.6)
MONOCYTES NFR BLD AUTO: 9.3 % — SIGNIFICANT CHANGE UP (ref 1.7–9.3)
MONOCYTES NFR BLD AUTO: 9.3 % — SIGNIFICANT CHANGE UP (ref 1.7–9.3)
NEUTROPHILS # BLD AUTO: 5.31 K/UL — SIGNIFICANT CHANGE UP (ref 1.4–6.5)
NEUTROPHILS # BLD AUTO: 5.31 K/UL — SIGNIFICANT CHANGE UP (ref 1.4–6.5)
NEUTROPHILS NFR BLD AUTO: 56.3 % — SIGNIFICANT CHANGE UP (ref 42.2–75.2)
NEUTROPHILS NFR BLD AUTO: 56.3 % — SIGNIFICANT CHANGE UP (ref 42.2–75.2)
NRBC # BLD: 0 /100 WBCS — SIGNIFICANT CHANGE UP (ref 0–0)
NRBC # BLD: 0 /100 WBCS — SIGNIFICANT CHANGE UP (ref 0–0)
PHOSPHATE SERPL-MCNC: 4.3 MG/DL — SIGNIFICANT CHANGE UP (ref 2.1–4.9)
PHOSPHATE SERPL-MCNC: 4.3 MG/DL — SIGNIFICANT CHANGE UP (ref 2.1–4.9)
PLATELET # BLD AUTO: 158 K/UL — SIGNIFICANT CHANGE UP (ref 130–400)
PLATELET # BLD AUTO: 158 K/UL — SIGNIFICANT CHANGE UP (ref 130–400)
PMV BLD: 10.4 FL — SIGNIFICANT CHANGE UP (ref 7.4–10.4)
PMV BLD: 10.4 FL — SIGNIFICANT CHANGE UP (ref 7.4–10.4)
POTASSIUM SERPL-MCNC: 4 MMOL/L — SIGNIFICANT CHANGE UP (ref 3.5–5)
POTASSIUM SERPL-MCNC: 4 MMOL/L — SIGNIFICANT CHANGE UP (ref 3.5–5)
POTASSIUM SERPL-SCNC: 4 MMOL/L — SIGNIFICANT CHANGE UP (ref 3.5–5)
POTASSIUM SERPL-SCNC: 4 MMOL/L — SIGNIFICANT CHANGE UP (ref 3.5–5)
RBC # BLD: 3.4 M/UL — LOW (ref 4.7–6.1)
RBC # BLD: 3.4 M/UL — LOW (ref 4.7–6.1)
RBC # FLD: 14.1 % — SIGNIFICANT CHANGE UP (ref 11.5–14.5)
RBC # FLD: 14.1 % — SIGNIFICANT CHANGE UP (ref 11.5–14.5)
RSV RNA NPH QL NAA+NON-PROBE: SIGNIFICANT CHANGE UP
RSV RNA NPH QL NAA+NON-PROBE: SIGNIFICANT CHANGE UP
SARS-COV-2 RNA SPEC QL NAA+PROBE: SIGNIFICANT CHANGE UP
SARS-COV-2 RNA SPEC QL NAA+PROBE: SIGNIFICANT CHANGE UP
SODIUM SERPL-SCNC: 131 MMOL/L — LOW (ref 135–146)
SODIUM SERPL-SCNC: 131 MMOL/L — LOW (ref 135–146)
WBC # BLD: 9.45 K/UL — SIGNIFICANT CHANGE UP (ref 4.8–10.8)
WBC # BLD: 9.45 K/UL — SIGNIFICANT CHANGE UP (ref 4.8–10.8)
WBC # FLD AUTO: 9.45 K/UL — SIGNIFICANT CHANGE UP (ref 4.8–10.8)
WBC # FLD AUTO: 9.45 K/UL — SIGNIFICANT CHANGE UP (ref 4.8–10.8)

## 2024-01-03 PROCEDURE — 99232 SBSQ HOSP IP/OBS MODERATE 35: CPT

## 2024-01-03 RX ADMIN — Medication 10 MILLIGRAM(S): at 05:17

## 2024-01-03 RX ADMIN — Medication 10 MILLIGRAM(S): at 21:03

## 2024-01-03 RX ADMIN — Medication 500 MILLIGRAM(S): at 17:02

## 2024-01-03 RX ADMIN — Medication 1: at 17:01

## 2024-01-03 RX ADMIN — Medication 50 MILLIGRAM(S): at 05:17

## 2024-01-03 RX ADMIN — ISOSORBIDE DINITRATE 5 MILLIGRAM(S): 5 TABLET ORAL at 17:02

## 2024-01-03 RX ADMIN — Medication 10 MILLIGRAM(S): at 14:51

## 2024-01-03 RX ADMIN — Medication 500 MILLIGRAM(S): at 05:17

## 2024-01-03 RX ADMIN — CHLORHEXIDINE GLUCONATE 1 APPLICATION(S): 213 SOLUTION TOPICAL at 05:17

## 2024-01-03 RX ADMIN — TAMSULOSIN HYDROCHLORIDE 0.4 MILLIGRAM(S): 0.4 CAPSULE ORAL at 21:03

## 2024-01-03 RX ADMIN — ISOSORBIDE DINITRATE 5 MILLIGRAM(S): 5 TABLET ORAL at 05:17

## 2024-01-03 RX ADMIN — ISOSORBIDE DINITRATE 5 MILLIGRAM(S): 5 TABLET ORAL at 11:17

## 2024-01-03 RX ADMIN — Medication 1: at 08:06

## 2024-01-03 RX ADMIN — Medication 2: at 11:17

## 2024-01-03 RX ADMIN — ATORVASTATIN CALCIUM 40 MILLIGRAM(S): 80 TABLET, FILM COATED ORAL at 21:03

## 2024-01-03 RX ADMIN — APIXABAN 2.5 MILLIGRAM(S): 2.5 TABLET, FILM COATED ORAL at 21:03

## 2024-01-03 RX ADMIN — GABAPENTIN 300 MILLIGRAM(S): 400 CAPSULE ORAL at 11:17

## 2024-01-03 RX ADMIN — APIXABAN 2.5 MILLIGRAM(S): 2.5 TABLET, FILM COATED ORAL at 08:06

## 2024-01-03 RX ADMIN — Medication 20 MILLIGRAM(S): at 17:02

## 2024-01-03 RX ADMIN — Medication 81 MILLIGRAM(S): at 11:17

## 2024-01-03 NOTE — PROGRESS NOTE ADULT - SUBJECTIVE AND OBJECTIVE BOX
Chief complaint: Patient is a 89y old  Male who presents with a chief complaint of Cardiac Arrest (30 Dec 2023 12:13)    Interval history:  patient seen and examined at bedside, denies SOB, chest pain  OOB and ambulated with assistance around floor yesterday   CXR from yesterday still congested   IVC yesterday reported to be 2.1 and not collapsing   Creat 2.9 from 3.1    Past medical history is notable for: DM, HTN, HLD, CAD sp CABG    Review of systems: a complete 10- point review of systems was obtained and is negative except as stated in the interval history.    Vitals:    Vital Signs Last 24 Hrs  T(C): 36.6 (02 Jan 2024 09:00), Max: 36.6 (02 Jan 2024 00:00)  T(F): 97.8 (02 Jan 2024 09:00), Max: 97.9 (02 Jan 2024 00:00)  HR: 69 (02 Jan 2024 12:00) (69 - 69)  BP: 113/56 (02 Jan 2024 12:00) (95/54 - 149/74)  BP(mean): 78 (02 Jan 2024 12:00) (71 - 104)  RR: 20 (02 Jan 2024 12:00) (14 - 20)  SpO2: 98% (02 Jan 2024 12:00) (96% - 98%)    Parameters below as of 02 Jan 2024 12:00  Patient On (Oxygen Delivery Method): room air            Ins & outs:     12-28 @ 07:01  -  12-29 @ 07:00  --------------------------------------------------------  IN: 274 mL / OUT: 500 mL / NET: -226 mL    12-29 @ 07:01  -  12-30 @ 07:00  --------------------------------------------------------  IN: 39 mL / OUT: 500 mL / NET: -461 mL    I&O's Summary    30 Dec 2023 07:01  -  31 Dec 2023 07:00  --------------------------------------------------------  IN: 270 mL / OUT: 600 mL / NET: -330 mL    I&O's Summary    01 Jan 2024 07:01  -  02 Jan 2024 07:00  --------------------------------------------------------  IN: 680 mL / OUT: 850 mL / NET: -170 mL    02 Jan 2024 07:01  -  02 Jan 2024 13:19  --------------------------------------------------------  IN: 0 mL / OUT: 250 mL / NET: -250 mL              Weight trend:  Weight (kg): 72.575 (12-29)    Physical exam:  GENERAL: NAD, lying in bed comfortably  HEAD:  Atraumatic, normocephalic  EYES: EOMI, PERRLA, conjunctiva and sclera clear  ENT: Moist mucous membranes  NECK: Supple, no JVD  HEART: Regular rate and rhythm, no murmurs, rubs, or gallops, L chest ICD site with no swelling/hematoma, steristrips c/d/i  LUNGS: Unlabored respirations.  decreased BS at bases   ABDOMEN: Soft, nontender, nondistended, +BS  EXTREMITIES: 2+ peripheral pulses bilaterally. No clubbing, cyanosis, or edema  NERVOUS SYSTEM:  A&Ox3, no focal deficits   SKIN: No rashes or lesions    Data reviewed:  - Telemetry: V-paced on tele, HR 70's ?underlying afib     - ECG < from: 12 Lead ECG (12.28.23 @ 15:05) >  Diagnosis Line Atrial fibrillation with slow ventricular response  Right axis deviation  Left bundle branch block  Abnormal ECG        - Echo < from: TTE Echo Complete w/o Contrast w/ Doppler (12.29.23 @ 09:30) >  Summary:   1. Severely decreased global left ventricular systolic function with a   biplane EF of 31%. Mild eccentric left ventricular hypertrophy. Diastolic   function is indeterminate.   2. Normal right ventricular size with moderately reduced systolic   function.   3. Mildly enlarged left atrium.   4. Normalright atrial size.   5. Mild to moderate mitral valve regurgitation.   6. Sclerotic aortic valve with decreased opening. Focal calcification   along the left coronary cusp.   7. Mild-moderate tricuspid regurgitation.   8. Mild pulmonic valve regurgitation.   9. Severe pulmonary hypertension (PASP = 66mmHg). The IVC is dilated and   non-collapsing, indicating increased right atrial pressure.  10. There is no evidence of pericardial effusion.      - Radiology:   Xray Chest 2 Views PA/Lat (12.30.23 @ 08:43) >  Impression:    Bilateral opacities, left basilar focal atelectasis and cardiomegaly,   unchanged.    CT angio chest ct abdomen pelvis No aortic intramural hematoma, aneurysm or focal dissection. 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 Cholelithiasis and small pericholecystic inflammatory changes, may  represent acute cholecystitis.   Right lung spiculated mass and additional upper lobe lesion, as   described. Findings are concerning for neoplastic process.                   - Labs:                        10.7   10.02 )-----------( 135      ( 02 Jan 2024 06:46 )             33.5     01-02    131<L>  |  102  |  79<HH>  ----------------------------<  172<H>  4.3   |  17  |  2.9<H>    Ca    8.5      02 Jan 2024 06:46  Mg     3.0     01-02    TPro  5.8<L>  /  Alb  3.4<L>  /  TBili  0.7  /  DBili  0.2  /  AST  40  /  ALT  14  /  AlkPhos  188<H>  01-02    LIVER FUNCTIONS - ( 02 Jan 2024 10:45 )  Alb: 3.4 g/dL / Pro: 5.8 g/dL / ALK PHOS: 188 U/L / ALT: 14 U/L / AST: 40 U/L / GGT: x                     Lactate Trend  01-01 @ 10:25 Lactate:1.1            Cultures:    Medications:  MEDICATIONS  (STANDING):  MEDICATIONS  (STANDING):  apixaban 2.5 milliGRAM(s) Oral every 12 hours  aspirin  chewable 81 milliGRAM(s) Oral daily  atorvastatin 40 milliGRAM(s) Oral at bedtime  cephalexin 500 milliGRAM(s) Oral every 12 hours  chlorhexidine 2% Cloths 1 Application(s) Topical <User Schedule>  dextrose 5%. 1000 milliLiter(s) (100 mL/Hr) IV Continuous <Continuous>  dextrose 5%. 1000 milliLiter(s) (50 mL/Hr) IV Continuous <Continuous>  dextrose 50% Injectable 25 Gram(s) IV Push once  dextrose 50% Injectable 12.5 Gram(s) IV Push once  dextrose 50% Injectable 25 Gram(s) IV Push once  gabapentin 300 milliGRAM(s) Oral daily  glucagon  Injectable 1 milliGRAM(s) IntraMuscular once  hydrALAZINE 10 milliGRAM(s) Oral three times a day  influenza  Vaccine (HIGH DOSE) 0.7 milliLiter(s) IntraMuscular once  insulin lispro (ADMELOG) corrective regimen sliding scale   SubCutaneous three times a day before meals  isosorbide   dinitrate Tablet (ISORDIL) 5 milliGRAM(s) Oral three times a day  metoprolol succinate ER 50 milliGRAM(s) Oral daily  senna 2 Tablet(s) Oral at bedtime  tamsulosin 0.4 milliGRAM(s) Oral at bedtime    MEDICATIONS  (PRN):  dextrose Oral Gel 15 Gram(s) Oral once PRN Blood Glucose LESS THAN 70 milliGRAM(s)/deciliter                 Chief complaint: Patient is a 89y old  Male who presents with a chief complaint of Cardiac Arrest (30 Dec 2023 12:13)    Interval history:  Seen patient and examined at bedside this AM.   Patient feels well and improved.   Denies CP, SOB, and dizziness.    Past medical history is notable for: DM, HTN, HLD, CAD s/p CABG    Review of systems: a complete 10- point review of systems was obtained and is negative except as stated in the interval history.    Vitals:  T(C): 37.1 (01-03-24 @ 16:18), Max: 37.2 (01-02-24 @ 23:16)  T(F): 98.8 (01-03-24 @ 16:18), Max: 98.9 (01-02-24 @ 23:16)  HR: 69 (01-03-24 @ 16:18) (69 - 69)  BP: 119/75 (01-03-24 @ 16:18) (100/56 - 149/65)  BP(mean): 92 (01-03-24 @ 16:18) (79 - 93)  RR: 18 (01-03-24 @ 16:18) (17 - 18)  SpO2: 97% (01-03-24 @ 16:18) (97% - 99%)          Ins & outs:     01-02 @ 07:01  -  01-03 @ 07:00  --------------------------------------------------------  IN: 240 mL / OUT: 1000 mL / NET: -760 mL    01-03 @ 07:01  -  01-03 @ 16:30  --------------------------------------------------------  IN: 450 mL / OUT: 880 mL / NET: -430 mL          Weight trend:  Weight (kg): 72.575 (12-29)    Physical exam:  GENERAL: NAD, lying in bed comfortably  HEAD:  Atraumatic, normocephalic  EYES: EOMI, PERRLA, conjunctiva and sclera clear  ENT: Moist mucous membranes  NECK: Supple, no JVD  HEART: Regular rate and rhythm, no murmurs, rubs, or gallops, L chest ICD site with no swelling/hematoma, steri-strips c/d/i  LUNGS: Unlabored respirations. bilateral breath sounds are clear   ABDOMEN: Soft, nontender, nondistended, +BS  EXTREMITIES: 2+ peripheral pulses bilaterally. No clubbing, cyanosis, or edema  NERVOUS SYSTEM:  A&Ox3, no focal deficits   SKIN: No rashes or lesions    Data reviewed:  - Telemetry: V-paced underlying SR HR 69    - ECG < from: 12 Lead ECG (12.28.23 @ 15:05) >  Diagnosis Line Atrial fibrillation with slow ventricular response  Right axis deviation  Left bundle branch block  Abnormal ECG        - Echo < from: TTE Echo Complete w/o Contrast w/ Doppler (12.29.23 @ 09:30) >  Summary:   1. Severely decreased global left ventricular systolic function with a   biplane EF of 31%. Mild eccentric left ventricular hypertrophy. Diastolic   function is indeterminate.   2. Normal right ventricular size with moderately reduced systolic   function.   3. Mildly enlarged left atrium.   4. Normalright atrial size.   5. Mild to moderate mitral valve regurgitation.   6. Sclerotic aortic valve with decreased opening. Focal calcification   along the left coronary cusp.   7. Mild-moderate tricuspid regurgitation.   8. Mild pulmonic valve regurgitation.   9. Severe pulmonary hypertension (PASP = 66mmHg). The IVC is dilated and   non-collapsing, indicating increased right atrial pressure.  10. There is no evidence of pericardial effusion.      - Radiology:   Xray Chest 2 Views PA/Lat (12.30.23 @ 08:43) >  Impression:    Bilateral opacities, left basilar focal atelectasis and cardiomegaly,   unchanged.    CT angio chest ct abdomen pelvis No aortic intramural hematoma, aneurysm or focal dissection. 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 Cholelithiasis and small pericholecystic inflammatory changes, may  represent acute cholecystitis.   Right lung spiculated mass and additional upper lobe lesion, as   described. Findings are concerning for neoplastic process.    - Labs:                        10.3   9.45  )-----------( 158      ( 03 Jan 2024 05:43 )             31.2     01-03    131<L>  |  102  |  79<HH>  ----------------------------<  164<H>  4.0   |  16<L>  |  2.5<H>    Ca    8.4      03 Jan 2024 05:43  Phos  4.3     01-03  Mg     2.5     01-03    TPro  5.8<L>  /  Alb  3.4<L>  /  TBili  0.7  /  DBili  0.2  /  AST  40  /  ALT  14  /  AlkPhos  188<H>  01-02    LIVER FUNCTIONS - ( 02 Jan 2024 10:45 )  Alb: 3.4 g/dL / Pro: 5.8 g/dL / ALK PHOS: 188 U/L / ALT: 14 U/L / AST: 40 U/L / GGT: x           Lactate Trend  01-01 @ 10:25 Lactate:1.1     Urinalysis Basic - ( 03 Jan 2024 05:43 )    Color: x / Appearance: x / SG: x / pH: x  Gluc: 164 mg/dL / Ketone: x  / Bili: x / Urobili: x   Blood: x / Protein: x / Nitrite: x   Leuk Esterase: x / RBC: x / WBC x   Sq Epi: x / Non Sq Epi: x / Bacteria: x                Cultures:    Medications:  MEDICATIONS  (STANDING):  apixaban 2.5 milliGRAM(s) Oral every 12 hours  aspirin  chewable 81 milliGRAM(s) Oral daily  atorvastatin 40 milliGRAM(s) Oral at bedtime  cephalexin 500 milliGRAM(s) Oral every 12 hours  chlorhexidine 2% Cloths 1 Application(s) Topical <User Schedule>  dextrose 5%. 1000 milliLiter(s) (50 mL/Hr) IV Continuous <Continuous>  dextrose 5%. 1000 milliLiter(s) (100 mL/Hr) IV Continuous <Continuous>  dextrose 50% Injectable 25 Gram(s) IV Push once  dextrose 50% Injectable 25 Gram(s) IV Push once  dextrose 50% Injectable 12.5 Gram(s) IV Push once  gabapentin 300 milliGRAM(s) Oral daily  glucagon  Injectable 1 milliGRAM(s) IntraMuscular once  hydrALAZINE 10 milliGRAM(s) Oral three times a day  influenza  Vaccine (HIGH DOSE) 0.7 milliLiter(s) IntraMuscular once  insulin lispro (ADMELOG) corrective regimen sliding scale   SubCutaneous three times a day before meals  isosorbide   dinitrate Tablet (ISORDIL) 5 milliGRAM(s) Oral three times a day  metoprolol succinate ER 50 milliGRAM(s) Oral daily  senna 2 Tablet(s) Oral at bedtime  tamsulosin 0.4 milliGRAM(s) Oral at bedtime  torsemide 20 milliGRAM(s) Oral once    MEDICATIONS  (PRN):  acetaminophen     Tablet .. 650 milliGRAM(s) Oral every 8 hours PRN Moderate Pain (4 - 6)  dextrose Oral Gel 15 Gram(s) Oral once PRN Blood Glucose LESS THAN 70 milliGRAM(s)/deciliter

## 2024-01-03 NOTE — PROGRESS NOTE ADULT - ASSESSMENT
89M, hx of DM, HTN, HLD, CAD sp CABG (years ago), hypothyroidism, brought in by daughter to ED with 1 day of chest pain and 2 weeks of bilateral LE "heaviness" described as soreness and has not been ambulating normally since then. In the ED , found to have slow afib and high degree AVB. Sustained brief cardiac arrest in ED, achieved ROSC and was admitted to CCU. Had TVP placed. On 12/29th, he underwent Biv ICD implant by Dr. Lopez. He was transferred to  post implantation.     Impression and Plan:    # Atrial fibrillation  #. High-degree AV block / transient CHB  Transient CHB with brief  in-hospital cardiac arrest  s/p TVP in CCU  ECHO Severe LV dysfunction. EF 31%. Severe pulmonary hypertension (PASP = 66mmHg). The IVC is dilated and non-collapsing, indicating increased right atrial pressure.  -  SP BiV ICD Implant 12/29. CXR done with no pneumothorax.  Interrogation completed, numbers within appropriate range  - cont on metoprolol 25mg BID, plan to switch to Toprol 50mg 12/31  - Started on Keflex 500mg BID x 5 days on 12/31   - continue monitoring on cardiology telemetry unit  - CXR 12/30 worsened pulmonary congestion  - Eliquis restarted 1/1 evening  -LFTs were elevated on admission, now improving   -  PT c/s done on 12/31-->Sub-acute Rehab; vs home depending on progress    # CAD s/p CABG  # NSTEMI (possibly type II secondary to cardiac arrest / underlying CAD)  #ICM (severe dysfunction)-->new   #HTN  on admission troponin 4152  - cont BB , cont on ASA 81MG daily  - Start on isordil 5mg TID  - on hydralazine 10 mg tid   -  IV Lasix 40 mg 1/2   - CXR 1/1 still appears congested   - IVC 1/1 reported 2.1  cm and non collapsible   - LA 1.1 1/1  - HOLD on starting ACEi/ARB d/t MAUREEN ( hold home dose of benazepril 20mg ) -->may restart when renal fx stable   - possible cath for ischemic w/u when renal function improve and clinical improvement -->likely as outpatient     #MAUREEN  - Cr now as high 3.1-->2.9  - Avoid nephrotoxins  -monitor BUN/Cr      # Right lung mass possibly neoplastic note don CT scan   - CT angio 12/28 Right lung spiculated mass and additional upper lobe lesion, as described. Findings are concerning for neoplastic process.  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 diabetic neuropathy  A1C 7.1   - Cont on ISS while inpatient ( hold home PO diabetic meds for now d/t MAUREEN )   - F.S ACHS   - cont on gabapentin 300mg po daily     #HLD  - cont on atorvastatin 40mg po  - LDL 53    # BPH  - Cont on tamsulosin 0.4mg  - strict I & O    miscellaneous  CODE STATUS: Full code  diet: dash , carb consistent  DVT prophylaxis: Eliquis   Assessment:   89 year old Male with history of CAD s/p CABG presented with atrial flutter with CHB c/b brief cardiac arrest, now s/p placement of BiV ICD on 12/29 . TTE with LVE 31%, moderately reduced RV function, mild-moderate TR, and severe pHTN with dilated and noncollapsing IVC. Hospital course has been c/b MAUREEN (peaked Cr 3.1), most likely ATN in the setting of arrest and JOSE.     # Atrial fibrillation  #. High-degree AV block / transient CHB  Transient CHB with brief  in-hospital cardiac arrest  s/p TVP in CCU  ECHO Severe LV dysfunction. EF 31%. Severe pulmonary hypertension (PASP = 66mmHg). The IVC is dilated and non-collapsing, indicating increased right atrial pressure.  -  SP BiV ICD Implant 12/29. CXR done with no pneumothorax.  Interrogation completed, numbers within appropriate range  - cont on metoprolol 25mg BID, plan to switch to Toprol 50mg 12/31  - Started on Keflex 500mg BID x 5 days on 12/31   - continue monitoring on cardiology telemetry unit  - CXR 12/30 worsened pulmonary congestion  - Eliquis restarted 1/1 evening  -LFTs were elevated on admission, now improving   - f/u CMP in AM    # CAD s/p CABG  # NSTEMI (possibly type II secondary to cardiac arrest / underlying CAD)  #ICM (severe dysfunction)-->new   #HTN  on admission troponin 4152  - cont BB , cont on ASA 81MG daily  - Continue on hydralazine 10 mg TID and isordil 5mg TID  - s/p IV Lasix   - LA 1.1 1/1  - holding ACE/ARB 2/2 MAUREEN (although improving)  - POCUS IVC 1.7 cm & < 50% collapsing   - Start on torsemide 20 mg daily   -  PT recs ROXANA, dc plan for 1/4     - Plan for [facility] to assess CAD and r/o schemic cause of reduced EF, likely as outpatient       #MAUREEN  - peaked Cr 3.1  - Avoid nephrotoxins  - follow-up CMP in AM     # Right lung mass possibly neoplastic note don CT scan   - CT angio 12/28 Right lung spiculated mass and additional upper lobe lesion, as described. Findings are concerning for neoplastic process.  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 diabetic neuropathy  A1C 7.1   - Cont on ISS while inpatient ( hold home PO diabetic meds for now d/t MAUREEN )   - F.S ACHS   - cont on gabapentin 300mg po daily     #HLD  - cont on atorvastatin 40mg po  - LDL 53    # BPH  - Cont on tamsulosin 0.4mg  - strict I & O    miscellaneous  CODE STATUS: Full code  diet: dash , carb consistent  DVT prophylaxis: Eliquis    Dispo: [facility]OX[facility] 1/4  Assessment:   89 year old Male with history of CAD s/p CABG presented with atrial flutter with CHB c/b brief cardiac arrest, now s/p placement of BiV ICD on 12/29 . TTE with LVE 31%, moderately reduced RV function, mild-moderate TR, and severe pHTN with dilated and noncollapsing IVC. Hospital course has been c/b MAUREEN (peaked Cr 3.1), most likely ATN in the setting of arrest and JOSE.     # Atrial fibrillation  #. High-degree AV block / transient CHB  Transient CHB with brief  in-hospital cardiac arrest  s/p TVP in CCU  ECHO Severe LV dysfunction. EF 31%. Severe pulmonary hypertension (PASP = 66mmHg). The IVC is dilated and non-collapsing, indicating increased right atrial pressure.  -  SP BiV ICD Implant 12/29. CXR done with no pneumothorax.  Interrogation completed, numbers within appropriate range  - cont on metoprolol 25mg BID, plan to switch to Toprol 50mg 12/31  - Started on Keflex 500mg BID x 5 days on 12/31   - continue monitoring on cardiology telemetry unit  - CXR 12/30 worsened pulmonary congestion  - Eliquis restarted 1/1 evening  -LFTs were elevated on admission, now improving   - f/u CMP in AM    # CAD s/p CABG  # NSTEMI (possibly type II secondary to cardiac arrest / underlying CAD)  #ICM (severe dysfunction)-->new   #HTN  on admission troponin 4152  - cont BB , cont on ASA 81MG daily  - Continue on hydralazine 10 mg TID and isordil 5mg TID  - s/p IV Lasix   - LA 1.1 1/1  - holding ACE/ARB 2/2 MAUREEN (although improving)  - POCUS IVC 1.7 cm & < 50% collapsing   - Start on torsemide 20 mg daily   -  PT recs ROXANA, dc plan for 1/4     - Plan for [facility] to assess CAD and r/o schemic cause of reduced EF, likely as outpatient       #MAUREEN  - peaked Cr 3.1  - Avoid nephrotoxins  - follow-up CMP in AM     # Right lung mass possibly neoplastic note don CT scan   - CT angio 12/28 Right lung spiculated mass and additional upper lobe lesion, as described. Findings are concerning for neoplastic process.  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 diabetic neuropathy  A1C 7.1   - Cont on ISS while inpatient ( hold home PO diabetic meds for now d/t MAUREEN )   - F.S ACHS   - cont on gabapentin 300mg po daily     #HLD  - cont on atorvastatin 40mg po  - LDL 53    # BPH  - Cont on tamsulosin 0.4mg  - strict I & O    miscellaneous  CODE STATUS: Full code  diet: dash , carb consistent  DVT prophylaxis: Eliquis    Dispo: [facility] 1/4

## 2024-01-04 ENCOUNTER — TRANSCRIPTION ENCOUNTER (OUTPATIENT)
Age: 89
End: 2024-01-04

## 2024-01-04 LAB
ALBUMIN SERPL ELPH-MCNC: 3.1 G/DL — LOW (ref 3.5–5.2)
ALBUMIN SERPL ELPH-MCNC: 3.1 G/DL — LOW (ref 3.5–5.2)
ALP SERPL-CCNC: 192 U/L — HIGH (ref 30–115)
ALP SERPL-CCNC: 192 U/L — HIGH (ref 30–115)
ALT FLD-CCNC: 17 U/L — SIGNIFICANT CHANGE UP (ref 0–41)
ALT FLD-CCNC: 17 U/L — SIGNIFICANT CHANGE UP (ref 0–41)
ANION GAP SERPL CALC-SCNC: 13 MMOL/L — SIGNIFICANT CHANGE UP (ref 7–14)
ANION GAP SERPL CALC-SCNC: 13 MMOL/L — SIGNIFICANT CHANGE UP (ref 7–14)
AST SERPL-CCNC: 31 U/L — SIGNIFICANT CHANGE UP (ref 0–41)
AST SERPL-CCNC: 31 U/L — SIGNIFICANT CHANGE UP (ref 0–41)
BILIRUB SERPL-MCNC: 0.6 MG/DL — SIGNIFICANT CHANGE UP (ref 0.2–1.2)
BILIRUB SERPL-MCNC: 0.6 MG/DL — SIGNIFICANT CHANGE UP (ref 0.2–1.2)
BUN SERPL-MCNC: 73 MG/DL — CRITICAL HIGH (ref 10–20)
BUN SERPL-MCNC: 73 MG/DL — CRITICAL HIGH (ref 10–20)
CALCIUM SERPL-MCNC: 8.4 MG/DL — SIGNIFICANT CHANGE UP (ref 8.4–10.4)
CALCIUM SERPL-MCNC: 8.4 MG/DL — SIGNIFICANT CHANGE UP (ref 8.4–10.4)
CHLORIDE SERPL-SCNC: 104 MMOL/L — SIGNIFICANT CHANGE UP (ref 98–110)
CHLORIDE SERPL-SCNC: 104 MMOL/L — SIGNIFICANT CHANGE UP (ref 98–110)
CO2 SERPL-SCNC: 19 MMOL/L — SIGNIFICANT CHANGE UP (ref 17–32)
CO2 SERPL-SCNC: 19 MMOL/L — SIGNIFICANT CHANGE UP (ref 17–32)
CREAT SERPL-MCNC: 2.2 MG/DL — HIGH (ref 0.7–1.5)
CREAT SERPL-MCNC: 2.2 MG/DL — HIGH (ref 0.7–1.5)
EGFR: 28 ML/MIN/1.73M2 — LOW
EGFR: 28 ML/MIN/1.73M2 — LOW
GLUCOSE BLDC GLUCOMTR-MCNC: 175 MG/DL — HIGH (ref 70–99)
GLUCOSE BLDC GLUCOMTR-MCNC: 175 MG/DL — HIGH (ref 70–99)
GLUCOSE BLDC GLUCOMTR-MCNC: 189 MG/DL — HIGH (ref 70–99)
GLUCOSE BLDC GLUCOMTR-MCNC: 189 MG/DL — HIGH (ref 70–99)
GLUCOSE BLDC GLUCOMTR-MCNC: 234 MG/DL — HIGH (ref 70–99)
GLUCOSE BLDC GLUCOMTR-MCNC: 234 MG/DL — HIGH (ref 70–99)
GLUCOSE SERPL-MCNC: 185 MG/DL — HIGH (ref 70–99)
GLUCOSE SERPL-MCNC: 185 MG/DL — HIGH (ref 70–99)
MAGNESIUM SERPL-MCNC: 2.3 MG/DL — SIGNIFICANT CHANGE UP (ref 1.8–2.4)
MAGNESIUM SERPL-MCNC: 2.3 MG/DL — SIGNIFICANT CHANGE UP (ref 1.8–2.4)
NT-PROBNP SERPL-SCNC: HIGH PG/ML (ref 0–300)
NT-PROBNP SERPL-SCNC: HIGH PG/ML (ref 0–300)
POTASSIUM SERPL-MCNC: 3.4 MMOL/L — LOW (ref 3.5–5)
POTASSIUM SERPL-MCNC: 3.4 MMOL/L — LOW (ref 3.5–5)
POTASSIUM SERPL-SCNC: 3.4 MMOL/L — LOW (ref 3.5–5)
POTASSIUM SERPL-SCNC: 3.4 MMOL/L — LOW (ref 3.5–5)
PROT SERPL-MCNC: 5.2 G/DL — LOW (ref 6–8)
PROT SERPL-MCNC: 5.2 G/DL — LOW (ref 6–8)
SODIUM SERPL-SCNC: 136 MMOL/L — SIGNIFICANT CHANGE UP (ref 135–146)
SODIUM SERPL-SCNC: 136 MMOL/L — SIGNIFICANT CHANGE UP (ref 135–146)

## 2024-01-04 PROCEDURE — 93010 ELECTROCARDIOGRAM REPORT: CPT

## 2024-01-04 PROCEDURE — 99232 SBSQ HOSP IP/OBS MODERATE 35: CPT

## 2024-01-04 RX ORDER — POTASSIUM CHLORIDE 20 MEQ
40 PACKET (EA) ORAL ONCE
Refills: 0 | Status: COMPLETED | OUTPATIENT
Start: 2024-01-04 | End: 2024-01-04

## 2024-01-04 RX ORDER — FUROSEMIDE 40 MG
20 TABLET ORAL DAILY
Refills: 0 | Status: DISCONTINUED | OUTPATIENT
Start: 2024-01-05 | End: 2024-01-08

## 2024-01-04 RX ORDER — POTASSIUM CHLORIDE 20 MEQ
20 PACKET (EA) ORAL ONCE
Refills: 0 | Status: COMPLETED | OUTPATIENT
Start: 2024-01-04 | End: 2024-01-04

## 2024-01-04 RX ADMIN — ATORVASTATIN CALCIUM 40 MILLIGRAM(S): 80 TABLET, FILM COATED ORAL at 21:05

## 2024-01-04 RX ADMIN — Medication 10 MILLIGRAM(S): at 13:46

## 2024-01-04 RX ADMIN — Medication 500 MILLIGRAM(S): at 05:20

## 2024-01-04 RX ADMIN — CHLORHEXIDINE GLUCONATE 1 APPLICATION(S): 213 SOLUTION TOPICAL at 05:20

## 2024-01-04 RX ADMIN — Medication 2: at 11:38

## 2024-01-04 RX ADMIN — Medication 40 MILLIEQUIVALENT(S): at 08:12

## 2024-01-04 RX ADMIN — Medication 10 MILLIGRAM(S): at 21:05

## 2024-01-04 RX ADMIN — Medication 500 MILLIGRAM(S): at 17:46

## 2024-01-04 RX ADMIN — Medication 20 MILLIGRAM(S): at 05:20

## 2024-01-04 RX ADMIN — APIXABAN 2.5 MILLIGRAM(S): 2.5 TABLET, FILM COATED ORAL at 21:05

## 2024-01-04 RX ADMIN — TAMSULOSIN HYDROCHLORIDE 0.4 MILLIGRAM(S): 0.4 CAPSULE ORAL at 21:05

## 2024-01-04 RX ADMIN — APIXABAN 2.5 MILLIGRAM(S): 2.5 TABLET, FILM COATED ORAL at 08:12

## 2024-01-04 RX ADMIN — ISOSORBIDE DINITRATE 5 MILLIGRAM(S): 5 TABLET ORAL at 10:32

## 2024-01-04 RX ADMIN — Medication 10 MILLIGRAM(S): at 05:20

## 2024-01-04 RX ADMIN — ISOSORBIDE DINITRATE 5 MILLIGRAM(S): 5 TABLET ORAL at 17:44

## 2024-01-04 RX ADMIN — Medication 81 MILLIGRAM(S): at 11:09

## 2024-01-04 RX ADMIN — Medication 50 MILLIGRAM(S): at 05:20

## 2024-01-04 RX ADMIN — GABAPENTIN 300 MILLIGRAM(S): 400 CAPSULE ORAL at 11:09

## 2024-01-04 RX ADMIN — Medication 20 MILLIEQUIVALENT(S): at 11:09

## 2024-01-04 RX ADMIN — Medication 1: at 08:12

## 2024-01-04 RX ADMIN — SENNA PLUS 2 TABLET(S): 8.6 TABLET ORAL at 21:05

## 2024-01-04 RX ADMIN — ISOSORBIDE DINITRATE 5 MILLIGRAM(S): 5 TABLET ORAL at 05:20

## 2024-01-04 NOTE — DISCHARGE NOTE PROVIDER - PROVIDER TOKENS
PROVIDER:[TOKEN:[[id number]],SCHEDULEDAPPT:[01/12/2024]] PROVIDER:[TOKEN:[[id number]],SCHEDULEDAPPT:[01/12/2024]]

## 2024-01-04 NOTE — DISCHARGE NOTE PROVIDER - CARE PROVIDER_API CALL
Ran Jorge  Interventional Cardiology  [address] [place] [address], [address]  [place] [address]  Phone: [phone]  Fax: [phone]  Scheduled Appointment: 01/12/2024   Ran Jorge  Interventional Cardiology  [address] [place] [address], [address]  [place] [address]  Phone: [phone]  Fax: [phone]  Scheduled Appointment: 01/12/2024   stand-by assist

## 2024-01-04 NOTE — PROGRESS NOTE ADULT - SUBJECTIVE AND OBJECTIVE BOX
Chief complaint: Patient is a 89y old  Male who presents with a chief complaint of Cardiac Arrest (30 Dec 2023 12:13)    Interval history:  Seen patient and examined at bedside this AM.   Patient feels well and improved.   Denies CP, SOB, and dizziness.    Past medical history is notable for: DM, HTN, HLD, CAD s/p CABG    Review of systems: a complete 10- point review of systems was obtained and is negative except as stated in the interval history.    Vitals:    T(C): 36.8 (04 Jan 2024 07:41), Max: 37.1 (03 Jan 2024 16:18)  T(F): 98.2 (04 Jan 2024 07:41), Max: 98.8 (03 Jan 2024 16:18)  HR: 69 (04 Jan 2024 10:31) (69 - 70)  BP: 108/65 (04 Jan 2024 10:31) (108/65 - 149/65)  BP(mean): 81 (04 Jan 2024 10:31) (81 - 97)    RR: 18 (04 Jan 2024 10:31) (15 - 20)  SpO2: 99% (04 Jan 2024 10:31) (97% - 99%)    O2 Parameters below as of 04 Jan 2024 10:31  Patient On (Oxygen Delivery Method): room air        Ins & outs:   I&O's Summary    03 Jan 2024 07:01  -  04 Jan 2024 07:00  --------------------------------------------------------  IN: 690 mL / OUT: 2030 mL / NET: -1340 mL    04 Jan 2024 07:01  -  04 Jan 2024 14:09  --------------------------------------------------------  IN: 220 mL / OUT: 1000 mL / NET: -780 mL          Weight trend:  Weight (kg): 72.575 (12-29)    Physical exam:  GENERAL: NAD, lying in bed comfortably  HEAD:  Atraumatic, normocephalic  EYES: EOMI, PERRLA, conjunctiva and sclera clear  ENT: Moist mucous membranes  NECK: Supple, no JVD  HEART: Regular rate and rhythm, no murmurs, rubs, or gallops, L chest ICD site with no swelling/hematoma, steri-strips c/d/i  LUNGS: Unlabored respirations. bilateral breath sounds are clear   ABDOMEN: Soft, nontender, nondistended, +BS  EXTREMITIES: 2+ peripheral pulses bilaterally. No clubbing, cyanosis, or edema  NERVOUS SYSTEM:  A&Ox3, no focal deficits   SKIN: No rashes or lesions    Data reviewed:  - Telemetry: V-paced underlying SR HR 69    - ECG < from: 12 Lead ECG (12.28.23 @ 15:05) >  Diagnosis Line Atrial fibrillation with slow ventricular response  Right axis deviation  Left bundle branch block  Abnormal ECG        - Echo < from: TTE Echo Complete w/o Contrast w/ Doppler (12.29.23 @ 09:30) >  Summary:   1. Severely decreased global left ventricular systolic function with a   biplane EF of 31%. Mild eccentric left ventricular hypertrophy. Diastolic   function is indeterminate.   2. Normal right ventricular size with moderately reduced systolic   function.   3. Mildly enlarged left atrium.   4. Normalright atrial size.   5. Mild to moderate mitral valve regurgitation.   6. Sclerotic aortic valve with decreased opening. Focal calcification   along the left coronary cusp.   7. Mild-moderate tricuspid regurgitation.   8. Mild pulmonic valve regurgitation.   9. Severe pulmonary hypertension (PASP = 66mmHg). The IVC is dilated and   non-collapsing, indicating increased right atrial pressure.  10. There is no evidence of pericardial effusion.      - Radiology:   Xray Chest 2 Views PA/Lat (12.30.23 @ 08:43) >  Impression:    Bilateral opacities, left basilar focal atelectasis and cardiomegaly,   unchanged.    CT angio chest ct abdomen pelvis No aortic intramural hematoma, aneurysm or focal dissection. 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 Cholelithiasis and small pericholecystic inflammatory changes, may  represent acute cholecystitis.   Right lung spiculated mass and additional upper lobe lesion, as   described. Findings are concerning for neoplastic process.    - Labs:                        10.3   9.45  )-----------( 158      ( 03 Jan 2024 05:43 )             31.2     01-03    131<L>  |  102  |  79<HH>  ----------------------------<  164<H>  4.0   |  16<L>  |  2.5<H>    Ca    8.4      03 Jan 2024 05:43  Phos  4.3     01-03  Mg     2.5     01-03    TPro  5.8<L>  /  Alb  3.4<L>  /  TBili  0.7  /  DBili  0.2  /  AST  40  /  ALT  14  /  AlkPhos  188<H>  01-02    LIVER FUNCTIONS - ( 02 Jan 2024 10:45 )  Alb: 3.4 g/dL / Pro: 5.8 g/dL / ALK PHOS: 188 U/L / ALT: 14 U/L / AST: 40 U/L / GGT: x           Lactate Trend  01-01 @ 10:25 Lactate:1.1     Urinalysis Basic - ( 03 Jan 2024 05:43 )    Color: x / Appearance: x / SG: x / pH: x  Gluc: 164 mg/dL / Ketone: x  / Bili: x / Urobili: x   Blood: x / Protein: x / Nitrite: x   Leuk Esterase: x / RBC: x / WBC x   Sq Epi: x / Non Sq Epi: x / Bacteria: x                Cultures:    Medications:  MEDICATIONS  (STANDING):  apixaban 2.5 milliGRAM(s) Oral every 12 hours  aspirin  chewable 81 milliGRAM(s) Oral daily  atorvastatin 40 milliGRAM(s) Oral at bedtime  cephalexin 500 milliGRAM(s) Oral every 12 hours  chlorhexidine 2% Cloths 1 Application(s) Topical <User Schedule>  dextrose 5%. 1000 milliLiter(s) (50 mL/Hr) IV Continuous <Continuous>  dextrose 5%. 1000 milliLiter(s) (100 mL/Hr) IV Continuous <Continuous>  dextrose 50% Injectable 25 Gram(s) IV Push once  dextrose 50% Injectable 25 Gram(s) IV Push once  dextrose 50% Injectable 12.5 Gram(s) IV Push once  gabapentin 300 milliGRAM(s) Oral daily  glucagon  Injectable 1 milliGRAM(s) IntraMuscular once  hydrALAZINE 10 milliGRAM(s) Oral three times a day  influenza  Vaccine (HIGH DOSE) 0.7 milliLiter(s) IntraMuscular once  insulin lispro (ADMELOG) corrective regimen sliding scale   SubCutaneous three times a day before meals  isosorbide   dinitrate Tablet (ISORDIL) 5 milliGRAM(s) Oral three times a day  metoprolol succinate ER 50 milliGRAM(s) Oral daily  senna 2 Tablet(s) Oral at bedtime  tamsulosin 0.4 milliGRAM(s) Oral at bedtime  torsemide 20 milliGRAM(s) Oral once    MEDICATIONS  (PRN):  acetaminophen     Tablet .. 650 milliGRAM(s) Oral every 8 hours PRN Moderate Pain (4 - 6)  dextrose Oral Gel 15 Gram(s) Oral once PRN Blood Glucose LESS THAN 70 milliGRAM(s)/deciliter                 Chief complaint: Patient is a 89y old  Male who presents with a chief complaint of Cardiac Arrest (30 Dec 2023 12:13)    Interval history:  Seen patient and examined at bedside this AM.   Patient feels well and improved.   Denies CP, SOB, and dizziness.    Past medical history is notable for: DM, HTN, HLD, CAD s/p CABG    Review of systems: a complete 10- point review of systems was obtained and is negative except as stated in the interval history.    Vitals:    T(C): 36.8 (04 Jan 2024 07:41), Max: 37.1 (03 Jan 2024 16:18)  T(F): 98.2 (04 Jan 2024 07:41), Max: 98.8 (03 Jan 2024 16:18)  HR: 69 (04 Jan 2024 10:31) (69 - 70)  BP: 108/65 (04 Jan 2024 10:31) (108/65 - 149/65)  BP(mean): 81 (04 Jan 2024 10:31) (81 - 97)    RR: 18 (04 Jan 2024 10:31) (15 - 20)  SpO2: 99% (04 Jan 2024 10:31) (97% - 99%)    O2 Parameters below as of 04 Jan 2024 10:31  Patient On (Oxygen Delivery Method): room air        Ins & outs:   I&O's Summary    03 Jan 2024 07:01  -  04 Jan 2024 07:00  --------------------------------------------------------  IN: 690 mL / OUT: 2030 mL / NET: -1340 mL    04 Jan 2024 07:01  -  04 Jan 2024 14:09  --------------------------------------------------------  IN: 220 mL / OUT: 1000 mL / NET: -780 mL          Weight trend:  Weight (kg): 72.575 (12-29)    Physical exam:  GENERAL: NAD, lying in bed comfortably  HEAD:  Atraumatic, normocephalic  EYES: EOMI, PERRLA, conjunctiva and sclera clear  ENT: Moist mucous membranes  NECK: Supple, no JVD  HEART: Regular rate and rhythm, no murmurs, rubs, or gallops, L chest ICD site with no swelling/hematoma, steri-strips c/d/i  LUNGS: Unlabored respirations. bilateral breath sounds are clear   ABDOMEN: Soft, nontender, nondistended, +BS  EXTREMITIES: 2+ peripheral pulses bilaterally. No clubbing, cyanosis, or edema  NERVOUS SYSTEM:  A&Ox3, no focal deficits   SKIN: No rashes or lesions    Data reviewed:  - Telemetry: V-paced underlying SR HR 69    - ECG < from: 12 Lead ECG (12.28.23 @ 15:05) >  Diagnosis Line Atrial fibrillation with slow ventricular response  Right axis deviation  Left bundle branch block  Abnormal ECG    - Echo < from: TTE Echo Complete w/o Contrast w/ Doppler (12.29.23 @ 09:30) >  Summary:   1. Severely decreased global left ventricular systolic function with a   biplane EF of 31%. Mild eccentric left ventricular hypertrophy. Diastolic   function is indeterminate.   2. Normal right ventricular size with moderately reduced systolic   function.   3. Mildly enlarged left atrium.   4. Normalright atrial size.   5. Mild to moderate mitral valve regurgitation.   6. Sclerotic aortic valve with decreased opening. Focal calcification   along the left coronary cusp.   7. Mild-moderate tricuspid regurgitation.   8. Mild pulmonic valve regurgitation.   9. Severe pulmonary hypertension (PASP = 66mmHg). The IVC is dilated and   non-collapsing, indicating increased right atrial pressure.  10. There is no evidence of pericardial effusion.      - Radiology:   Xray Chest 2 Views PA/Lat (12.30.23 @ 08:43) >  Impression:    Bilateral opacities, left basilar focal atelectasis and cardiomegaly,   unchanged.    CT angio chest ct abdomen pelvis No aortic intramural hematoma, aneurysm or focal dissection. 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 Cholelithiasis and small pericholecystic inflammatory changes, may  represent acute cholecystitis.   Right lung spiculated mass and additional upper lobe lesion, as   described. Findings are concerning for neoplastic process.        - Labs:                        10.3   9.45  )-----------( 158      ( 03 Jan 2024 05:43 )             31.2     01-04    136  |  104  |  73<HH>  ----------------------------<  185<H>  3.4<L>   |  19  |  2.2<H>    Ca    8.4      04 Jan 2024 05:12  Phos  4.3     01-03  Mg     2.3     01-04    TPro  5.2<L>  /  Alb  3.1<L>  /  TBili  0.6  /  DBili  x   /  AST  31  /  ALT  17  /  AlkPhos  192<H>  01-04    LIVER FUNCTIONS - ( 04 Jan 2024 05:12 )  Alb: 3.1 g/dL / Pro: 5.2 g/dL / ALK PHOS: 192 U/L / ALT: 17 U/L / AST: 31 U/L / GGT: x             Lactate Trend  01-01 @ 10:25 Lactate:1.1     Urinalysis Basic - ( 04 Jan 2024 05:12 )    Color: x / Appearance: x / SG: x / pH: x  Gluc: 185 mg/dL / Ketone: x  / Bili: x / Urobili: x   Blood: x / Protein: x / Nitrite: x   Leuk Esterase: x / RBC: x / WBC x   Sq Epi: x / Non Sq Epi: x / Bacteria: x                  Cultures:    Medications:  MEDICATIONS  (STANDING):  apixaban 2.5 milliGRAM(s) Oral every 12 hours  aspirin  chewable 81 milliGRAM(s) Oral daily  atorvastatin 40 milliGRAM(s) Oral at bedtime  cephalexin 500 milliGRAM(s) Oral every 12 hours  chlorhexidine 2% Cloths 1 Application(s) Topical <User Schedule>  dextrose 5%. 1000 milliLiter(s) (50 mL/Hr) IV Continuous <Continuous>  dextrose 5%. 1000 milliLiter(s) (100 mL/Hr) IV Continuous <Continuous>  dextrose 50% Injectable 25 Gram(s) IV Push once  dextrose 50% Injectable 25 Gram(s) IV Push once  dextrose 50% Injectable 12.5 Gram(s) IV Push once  gabapentin 300 milliGRAM(s) Oral daily  glucagon  Injectable 1 milliGRAM(s) IntraMuscular once  hydrALAZINE 10 milliGRAM(s) Oral three times a day  influenza  Vaccine (HIGH DOSE) 0.7 milliLiter(s) IntraMuscular once  insulin lispro (ADMELOG) corrective regimen sliding scale   SubCutaneous three times a day before meals  isosorbide   dinitrate Tablet (ISORDIL) 5 milliGRAM(s) Oral three times a day  metoprolol succinate ER 50 milliGRAM(s) Oral daily  senna 2 Tablet(s) Oral at bedtime  tamsulosin 0.4 milliGRAM(s) Oral at bedtime  torsemide 20 milliGRAM(s) Oral once    MEDICATIONS  (PRN):  acetaminophen     Tablet .. 650 milliGRAM(s) Oral every 8 hours PRN Moderate Pain (4 - 6)  dextrose Oral Gel 15 Gram(s) Oral once PRN Blood Glucose LESS THAN 70 milliGRAM(s)/deciliter

## 2024-01-04 NOTE — DISCHARGE NOTE PROVIDER - NSDCFUSCHEDAPPT_GEN_ALL_CORE_FT
[facility]  [facility] [address]  Scheduled Appointment: 01/29/2024     [facility]  [facility] [address]  Scheduled Appointment: 01/29/2024     [facility]  ELECTROPH [address]  Scheduled Appointment: 01/29/2024    Ran Jorge  [facility]  CARDIOLOGY [address]  Scheduled Appointment: 02/02/2024     [facility]  ELECTROPH [address]  Scheduled Appointment: 01/29/2024    Ran Jorge  [facility]  CARDIOLOGY [address]  Scheduled Appointment: 02/02/2024

## 2024-01-04 NOTE — PROGRESS NOTE ADULT - ASSESSMENT
Assessment:   89 year old Male with history of CAD s/p CABG presented with atrial flutter with CHB c/b brief cardiac arrest, now s/p placement of BiV ICD on 12/29 . TTE with LVE 31%, moderately reduced RV function, mild-moderate TR, and severe pHTN with dilated and noncollapsing IVC. Hospital course has been c/b MAUREEN (peaked Cr 3.1), most likely ATN in the setting of arrest and JOSE.     # Atrial fibrillation  #High-degree AV block / transient CHB  Transient CHB with brief  in-hospital cardiac arrest  s/p TVP in CCU  ECHO Severe LV dysfunction. EF 31%. Severe pulmonary hypertension (PASP = 66mmHg). The IVC is dilated and non-collapsing, indicating increased right atrial pressure.  -  SP BiV ICD Implant 12/29. CXR done with no pneumothorax.  Interrogation completed, numbers within appropriate range  - cont on metoprolol 25mg BID, plan to switch to Toprol 50mg 12/31  - cont Keflex 500mg BID x 5 days on 12/31   - continue monitoring on cardiology telemetry unit  - CXR 12/30 worsened pulmonary congestion  - Conton eliquis 2.5mg po BID   -LFTs were elevated on admission, now improving   - f/u CMP in AM    # CAD s/p CABG  # NSTEMI (possibly type II secondary to cardiac arrest / underlying CAD)  #ICM (severe dysfunction)-->new   #HTN  on admission troponin 4152  - cont BB , cont on ASA 81MG daily  - Continue on hydralazine 10 mg TID and isordil 5mg TID  - s/p IV Lasix switched to torsemide 20mg.   - LA 1.1 1/1  - holding ACE/ARB 2/2 MAUREEN (although improving)  - POCUS IVC 1.7 cm & < 50% collapsing   - Start on torsemide 20 mg daily   -  PT recs ROXANA, dc plan for 1/4     - Plan for [facility] to assess CAD and r/o schemic cause of reduced EF, likely as outpatient       #MAUREEN  - peaked Cr 3.1  - Avoid nephrotoxins  - follow-up CMP in AM     # Right lung mass possibly neoplastic note don CT scan   - CT angio 12/28 Right lung spiculated mass and additional upper lobe lesion, as described. Findings are concerning for neoplastic process.  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 diabetic neuropathy  A1C 7.1   - Cont on ISS while inpatient ( hold home PO diabetic meds for now d/t MAUREEN )   - F.S ACHS   - cont on gabapentin 300mg po daily     #HLD  - cont on atorvastatin 40mg po  - LDL 53    # BPH  - Cont on tamsulosin 0.4mg  - strict I & O    miscellaneous  CODE STATUS: Full code  diet: dash , carb consistent  DVT prophylaxis: Eliquis    Dispo: ROXANA 1/4  Assessment:   89 year old Male with history of CAD s/p CABG presented with atrial flutter with CHB c/b brief cardiac arrest, now s/p placement of BiV ICD on 12/29 . TTE with LVE 31%, moderately reduced RV function, mild-moderate TR, and severe pHTN with dilated and noncollapsing IVC. Hospital course has been c/b MAUREEN (peaked Cr 3.1), most likely ATN in the setting of arrest and JOSE.     # Atrial fibrillation  #High-degree AV block / transient CHB  Transient CHB with brief  in-hospital cardiac arrest  s/p TVP in CCU  ECHO Severe LV dysfunction. EF 31%. Severe pulmonary hypertension (PASP = 66mmHg). The IVC is dilated and non-collapsing, indicating increased right atrial pressure.  -  SP BiV ICD Implant 12/29. CXR done with no pneumothorax.  Interrogation completed, numbers within appropriate range  - cont on metoprolol 25mg BID, plan to switch to Toprol 50mg 12/31  - cont Keflex 500mg BID x 5 days on 12/31   - continue monitoring on cardiology telemetry unit  - CXR 12/30 worsened pulmonary congestion  - Conton eliquis 2.5mg po BID   -LFTs were elevated on admission, now improving   - f/u CMP in AM    # CAD s/p CABG  # NSTEMI (possibly type II secondary to cardiac arrest / underlying CAD)  #ICM (severe dysfunction)-->new   #HTN  on admission troponin 4152  - cont BB , cont on ASA 81MG daily  - Continue on hydralazine 10 mg TID and isordil 5mg TID  - s/p IV Lasix switched to torsemide 20mg.   - LA 1.1 1/1  - holding ACE/ARB 2/2 MAUREEN (although improving)  - POCUS IVC 1.7 cm & < 50% collapsing   - Start on torsemide 20 mg daily   -  PT recs ROXANA, dc plan for 1/4     - Plan for [facility] to assess CAD and r/o schemic cause of reduced EF, likely as outpatient       #MAUREEN  - peaked Cr 3.1  - Avoid nephrotoxins  - follow-up CMP in AM     # Right lung mass possibly neoplastic note don CT scan   - CT angio 12/28 Right lung spiculated mass and additional upper lobe lesion, as described. Findings are concerning for neoplastic process.  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 diabetic neuropathy  A1C 7.1   - Cont on ISS while inpatient ( hold home PO diabetic meds for now d/t MAUREEN )   - F.S ACHS   - cont on gabapentin 300mg po daily     #HLD  - cont on atorvastatin 40mg po  - LDL 53    # BPH  - Cont on tamsulosin 0.4mg  - strict I & O    miscellaneous  CODE STATUS: Full code  diet: dash , carb consistent  DVT prophylaxis: Eliquis    Dispo: ROXANA 1/4  Assessment:   89 year old Male with history of CAD s/p CABG presented with atrial flutter with CHB c/b brief cardiac arrest, now s/p placement of BiV ICD on 12/29 . TTE with LVE 31%, moderately reduced RV function, mild-moderate TR, and severe pHTN with dilated and noncollapsing IVC. Hospital course has been c/b MAUREEN (peaked Cr 3.1), most likely ATN in the setting of arrest and JOSE.     # Atrial fibrillation  #High-degree AV block / transient CHB  Transient CHB with brief  in-hospital cardiac arrest  s/p TVP in CCU  ECHO Severe LV dysfunction. EF 31%. Severe pulmonary hypertension (PASP = 66mmHg). The IVC is dilated and non-collapsing, indicating increased right atrial pressure.  -  SP BiV ICD Implant 12/29. CXR done with no pneumothorax.  Interrogation completed, numbers within appropriate range  - cont on metoprolol 25mg BID, plan to switch to Toprol 50mg 12/31  - cont Keflex 500mg BID x 5 days on 12/31   - continue monitoring on cardiology telemetry unit  - CXR 12/30 worsened pulmonary congestion  - Conton eliquis 2.5mg po BID   -LFTs were elevated on admission, now improving   - f/u CMP in AM    # CAD s/p CABG  # NSTEMI (possibly type II secondary to cardiac arrest / underlying CAD)  #ICM (severe dysfunction)-->new   #HTN  on admission troponin 4152  - cont BB , cont on ASA 81MG daily  - Continue on hydralazine 10 mg TID and isordil 5mg TID  - POCUS IVC 1.7 cm & < 50% collapsing .   - cont holding ACE/ARB 2/2 MAUREEN (although improving)  - 1/3  s/p IV Lasix switched to torsemide 20mg. however with over diuresing ( output of 2L ). For discharge will switch back to home dosage of Lasix 20mg PO daily.  -Outpatient [facility] for ischemic w/u. He is to f/u with Dr. Jorge in [facility] markus 1/12th       #MAUREEN ( improving)   - peaked Cr 3.1. today 2.2   - Avoid nephrotoxins  - follow-up CMP in AM     # Right lung mass possibly neoplastic note don CT scan   - CT angio 12/28 Right lung spiculated mass and additional upper lobe lesion, as described. Findings are concerning for neoplastic process.  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 diabetic neuropathy  A1C 7.1   - Cont on ISS while inpatient ( hold home PO diabetic meds for now d/t MAUREEN )   - F.S ACHS   - cont on gabapentin 300mg po daily     #HLD  - cont on atorvastatin 40mg po  - LDL 53    # BPH  - Cont on tamsulosin 0.4mg  - strict I & O    miscellaneous  CODE STATUS: Full code  diet: dash , carb consistent  DVT prophylaxis: Eliquis    Dispo: ROXANA 1/4 -  PT reckandy SCHMIDT, pending authorization Assessment:   89 year old Male with history of CAD s/p CABG presented with atrial flutter with CHB c/b brief cardiac arrest, now s/p placement of BiV ICD on 12/29 . TTE with LVE 31%, moderately reduced RV function, mild-moderate TR, and severe pHTN with dilated and noncollapsing IVC. Hospital course has been c/b MAUREEN (peaked Cr 3.1), most likely ATN in the setting of arrest and JOSE.     # Atrial fibrillation  #High-degree AV block / transient CHB  Transient CHB with brief  in-hospital cardiac arrest  s/p TVP in CCU  ECHO Severe LV dysfunction. EF 31%. Severe pulmonary hypertension (PASP = 66mmHg). The IVC is dilated and non-collapsing, indicating increased right atrial pressure.  -  SP BiV ICD Implant 12/29. CXR done with no pneumothorax.  Interrogation completed, numbers within appropriate range  - cont on metoprolol 25mg BID, plan to switch to Toprol 50mg 12/31  - cont Keflex 500mg BID x 5 days on 12/31   - continue monitoring on cardiology telemetry unit  - CXR 12/30 worsened pulmonary congestion  - Conton eliquis 2.5mg po BID   -LFTs were elevated on admission, now improving   - f/u CMP in AM    # CAD s/p CABG  # NSTEMI (possibly type II secondary to cardiac arrest / underlying CAD)  #ICM (severe dysfunction)-->new   #HTN  on admission troponin 4152  - cont BB , cont on ASA 81MG daily  - Continue on hydralazine 10 mg TID and isordil 5mg TID  - POCUS IVC 1.7 cm & < 50% collapsing .   - cont holding ACE/ARB 2/2 MAUREEN (although improving)  - 1/3  s/p IV Lasix switched to torsemide 20mg. however with over diuresing ( output of 2L ). For discharge will switch back to home dosage of Lasix 20mg PO daily.  -Outpatient [facility] for ischemic w/u. He is to f/u with Dr. Jorge in [facility] mark[facility] 1/12th       #MAUREEN ( improving)   - peaked Cr 3.1. today 2.2   - Avoid nephrotoxins  - follow-up CMP in AM     # Right lung mass possibly neoplastic note don CT scan   - CT angio 12/28 Right lung spiculated mass and additional upper lobe lesion, as described. Findings are concerning for neoplastic process.  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 diabetic neuropathy  A1C 7.1   - Cont on ISS while inpatient ( hold home PO diabetic meds for now d/t MAUREEN )   - F.S ACHS   - cont on gabapentin 300mg po daily     #HLD  - cont on atorvastatin 40mg po  - LDL 53    # BPH  - Cont on tamsulosin 0.4mg  - strict I & O    miscellaneous  CODE STATUS: Full code  diet: dash , carb consistent  DVT prophylaxis: Eliquis    Dispo: ROXANA 1/4 -  PT reckandy SCHMIDT, pending authorization

## 2024-01-04 NOTE — DISCHARGE NOTE PROVIDER - HOSPITAL COURSE
89yo Male with history of BPH, HLD, HTN, DM, CAD sp CABG and preserved LVEF and daily exercise routine who presented with atrial flutter /AFib with CHB c/b brief cardiac arrest, now s/p placement of BiV ICD on 12/29th by Dr. Lopez. TTE with LVE 31%, moderately reduced RV function, mild-moderate TR, and severe pHTN (PASP = 66mmHg).  with dilated and noncollapsing IVC. CXR shows b/l opacities and effusion. He was diuresed with IV lasix x3 days and was switched to PO torsemide. Hospital course has been c/b MAUREEN  (peaked Cr 3.1), most likely ATN in the setting of arrest and JOSE. Due to MAUREEN holding ACE/ARB. He was started no hydralazine 10mg and isordil TID. other GDMT included toprol 50mg.     For newly reduced EF and given patient's excellent functional status, ischemic w/u LHC was consdiered. However, d/t MAUREEN he is to followup with Dr. Jorge outpatient to be scheduled for later date once renal function returns to baseline    sp Biv ICD, he completed x5 days of PO keflex. He is to following outpatient with Dr. Lopez n 1 month    For Aflutter/afib, he is to continue on eliquis 2.5mg BID     -LFTs were elevated on admission, now improving   - f/u CMP in AM    # CAD s/p CABG  # NSTEMI (possibly type II secondary to cardiac arrest / underlying CAD)  #ICM (severe dysfunction)-->new   #HTN  on admission troponin 4152  - cont BB , cont on ASA 81MG daily  - Continue on hydralazine 10 mg TID and isordil 5mg TID  - POCUS IVC 1.7 cm & < 50% collapsing .   - cont holding ACE/ARB 2/2 MAUREEN (although improving)  - 1/3  s/p IV Lasix switched to torsemide 20mg. however with over diuresing ( output of 2L ). For discharge will switch back to home dosage of Lasix 20mg PO daily.  -Outpatient LHC for ischemic w/u. He is to f/u with Dr. Jorge in offic markus 1/12th       #MAUREEN ( improving)   - peaked Cr 3.1. today 2.2   - Avoid nephrotoxins  - follow-up CMP in AM     # Right lung mass possibly neoplastic note don CT scan   - CT angio 12/28 Right lung spiculated mass and additional upper lobe lesion, as described. Findings are concerning for neoplastic process.  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 diabetic neuropathy  A1C 7.1   - Cont on ISS while inpatient ( hold home PO diabetic meds for now d/t MAUREEN )   - F.S ACHS   - cont on gabapentin 300mg po daily     #HLD  - cont on atorvastatin 40mg po  - LDL 53    # BPH  - Cont on tamsulosin 0.4mg  - strict I & O        1. CAD s/p CABG  2. ICM (severe LV dysfunction)  3. Atrial fibrillation  4. CHB s/p BiV-AICD  5. NSTEMI (possibly type II secondary to cardiac arrest / underlying CAD)  6. MAUREEN (possible JOSE component)    Plan:   - IVC today 1/03/24: 1.7 cm and 30% collapsing  - Start torsemide 20 mg PO daily   - Trend Cr and IVC  - Continue ASA 81 mg daily, apixaban 2.5 mg BID, hydralazine 10 mg TID, isordil 5 mg TID, and atorvastatin 40 mg nightly   - On Keflex for post-ICD abx  - Given patient's excellent functional status, would consider LHC when renal function returns to baseline  - Follow-up with Dr. Jorge on 1/12/24  - Discharge planning.     89yo Male with history of BPH, HLD, HTN, DM, Left CFA endarterectomy and Fem-bk POP PTFE 4/17/2020CAD sp CABG and preserved LVEF and daily exercise routine who presented with atrial flutter /AFib with CHB c/b brief cardiac arrest, now s/p placement of BiV ICD on 12/29th by Dr. Lopez. Status post  Biv ICD, he completed x 5 days of PO keflex. He is to follow up outpatient with Dr. Lopez in 1 month.    Also found to have  newly reduced EF, ICM (severe LV dysfunction). On echo shows LVEF 31%, moderately reduced RV function, mild-moderate TR, and severe pHTN (PASP = 66mmHg) with dilated and noncollapsing IVC. CXR shows b/l opacities and effusion. He was diuresed with IV lasix x3 days and was switched to PO diuretic lasix 20mg. Hospital course has been c/b MAUREEN  (peaked Cr 3.1), most likely ATN in the setting of arrest and JOSE. Due to MAUREEN holding ACE/ARB. He was started on hydralazine 10mg and isordil TID. Other GDMT included toprol 50mg.  Given patient's excellent functional status, ischemic w/u with LHC was considered. However, d/t MAUREEN he is to followup with Dr. Jorge outpatient to be scheduled for later date once renal function returns to baseline.    For Aflutter/Afib, he is to continue on Eliquis 2.5mg BID and BB.     Of note on admission, CT scan abd/pelvis, chest was obtained; which shows right lung mass. Findings are concerning for neoplastic process. He is to followup outpatient. 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 no acute intervention at this time.   He is hemodynamically stable for discharge to [facility].            89yo Male with history of BPH, HLD, HTN, DM, Left CFA endarterectomy and Fem-bk POP PTFE 4/17/2020CAD sp CABG and preserved LVEF and daily exercise routine who presented with atrial flutter /AFib with CHB c/b brief cardiac arrest, now s/p placement of BiV ICD on 12/29th by Dr. Lopez. Status post  Biv ICD, he completed x 5 days of PO keflex. He is to follow up outpatient with Dr. Lopez in 1 month.    Also found to have  newly reduced EF, ICM (severe LV dysfunction). On echo shows LVEF 31%, moderately reduced RV function, mild-moderate TR, and severe pHTN (PASP = 66mmHg) with dilated and noncollapsing IVC. CXR shows b/l opacities and effusion. He was diuresed with IV lasix x3 days and was switched to PO diuretic lasix 20mg. Hospital course has been c/b MAUREEN  (peaked Cr 3.1), most likely ATN in the setting of arrest and JOSE. Due to MAUREEN holding ACE/ARB. He was started on hydralazine 10mg and isordil TID. Other GDMT included toprol 50mg.  Given patient's excellent functional status, ischemic w/u with LHC was considered. However, d/t MAUREEN he is to followup with Dr. Jorge outpatient to be scheduled for later date once renal function returns to baseline.    For Aflutter/Afib, he is to continue on Eliquis 2.5mg BID and BB.     Of note on admission, CT scan abd/pelvis, chest was obtained; which shows right lung mass. Findings are concerning for neoplastic process. He is to followup outpatient. 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 no acute intervention at this time.   He is hemodynamically stable for discharge to [facility]            89yo Male with history of BPH, HLD, HTN, DM, Left CFA endarterectomy and Fem-bk POP PTFE 4/17/2020CAD sp CABG and preserved LVEF and daily exercise routine who presented with atrial flutter /AFib with CHB c/b brief cardiac arrest, now s/p placement of BiV ICD on 12/29th by Dr. Lopez. Status post  Biv ICD, he completed x 5 days of PO keflex. He is to follow up outpatient with Dr. Lopez in 1 month.    Also found to have  newly reduced EF, ICM (severe LV dysfunction). On echo shows LVEF 31%, moderately reduced RV function, mild-moderate TR, and severe pHTN (PASP = 66mmHg) with dilated and noncollapsing IVC. CXR shows b/l opacities and effusion. He was diuresed with IV lasix x3 days and was switched to PO diuretic lasix 20mg. Hospital course has been c/b MAUREEN  (peaked Cr 3.1), most likely ATN in the setting of arrest and JOSE. Due to MAUREEN holding ACE/ARB. He was started on hydralazine 10mg and isordil TID. Other GDMT included toprol 50mg.  Given patient's excellent functional status, ischemic w/u with LHC was considered. However, d/t MAUREEN he is to followup with Dr. Jorge outpatient to be scheduled for later date once renal function returns to baseline.    For Aflutter/Afib, he is to continue on Eliquis 2.5mg BID and BB.     Of note on admission, CT scan abd/pelvis, chest was obtained; which shows right lung mass. Findings are concerning for neoplastic process. He is to followup outpatient. 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 no acute intervention at this time.     He is hemodynamically stable for discharge to [facility].            89yo Male with history of BPH, HLD, HTN, DM, Left CFA endarterectomy and Fem-bk POP PTFE 4/17/2020CAD sp CABG and preserved LVEF and daily exercise routine who presented with atrial flutter /AFib with CHB c/b brief cardiac arrest, now s/p placement of BiV ICD on 12/29th by Dr. Lopez. Status post  Biv ICD, he completed x 5 days of PO keflex. He is to follow up outpatient with Dr. Lopez in 1 month.    Also found to have  newly reduced EF, ICM (severe LV dysfunction). On echo shows LVEF 31%, moderately reduced RV function, mild-moderate TR, and severe pHTN (PASP = 66mmHg) with dilated and noncollapsing IVC. CXR shows b/l opacities and effusion. He was diuresed with IV lasix x3 days and was switched to PO diuretic lasix 20mg. Hospital course has been c/b MAUREEN  (peaked Cr 3.1), most likely ATN in the setting of arrest and JOSE. Due to MAUREEN holding ACE/ARB. He was started on hydralazine 10mg and isordil TID. Other GDMT included toprol 50mg.  Given patient's excellent functional status, ischemic w/u with LHC was considered. However, d/t MAUREEN he is to followup with Dr. Jorge outpatient to be scheduled for later date once renal function returns to baseline.    For Aflutter/Afib, he is to continue on Eliquis 2.5mg BID and BB.     Of note on admission, CT scan abd/pelvis, chest was obtained; which shows right lung mass. Findings are concerning for neoplastic process. He is to followup outpatient. 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 no acute intervention at this time.     He is hemodynamically stable for discharge to [facility].

## 2024-01-04 NOTE — DISCHARGE NOTE PROVIDER - CARE PROVIDERS DIRECT ADDRESSES
[email or web address]

## 2024-01-04 NOTE — DISCHARGE NOTE PROVIDER - NSDCCPCAREPLAN_GEN_ALL_CORE_FT
PRINCIPAL DISCHARGE DIAGNOSIS  Diagnosis: Complete heart block  Assessment and Plan of Treatment: status post defribillator implant      SECONDARY DISCHARGE DIAGNOSES  Diagnosis: CAD (coronary artery disease)  Assessment and Plan of Treatment:     Diagnosis: Ischemic cardiomyopathy  Assessment and Plan of Treatment:

## 2024-01-04 NOTE — DISCHARGE NOTE PROVIDER - NSDCMRMEDTOKEN_GEN_ALL_CORE_FT
aspirin 81 mg oral tablet, chewable: 1 tab(s) orally once a day  atorvastatin 40 mg oral tablet: 1 tab(s) orally once a day (at bedtime)  benazepril 20 mg oral tablet: 1 tab(s) orally once a day  Farxiga 10 mg oral tablet: 1 tab(s) orally once a day  gabapentin 300 mg oral capsule: 1 cap(s) orally once a day  glipiZIDE 5 mg oral tablet: 1 tab(s) orally once a day  Januvia 25 mg oral tablet: 1 tab(s) orally once a day  Lasix 20 mg oral tablet: 1 tab(s) orally once a day  Metoprolol Tartrate 25 mg oral tablet: 1 tab(s) orally 2 times a day  tamsulosin 0.4 mg oral capsule: 1 cap(s) orally once a day   apixaban 2.5 mg oral tablet: 1 tab(s) orally every 12 hours  aspirin 81 mg oral tablet, chewable: 1 tab(s) orally once a day  atorvastatin 40 mg oral tablet: 1 tab(s) orally once a day (at bedtime)  Farxiga 10 mg oral tablet: 1 tab(s) orally once a day  gabapentin 300 mg oral capsule: 1 cap(s) orally once a day  glipiZIDE 5 mg oral tablet: 1 tab(s) orally once a day  hydrALAZINE 10 mg oral tablet: 1 tab(s) orally 3 times a day  isosorbide dinitrate 5 mg oral tablet: 1 tab(s) orally 3 times a day  Januvia 25 mg oral tablet: 1 tab(s) orally once a day  metoprolol succinate 50 mg oral tablet, extended release: 1 tab(s) orally once a day  tamsulosin 0.4 mg oral capsule: 1 cap(s) orally once a day  torsemide 20 mg oral tablet: 1 tab(s) orally once a day

## 2024-01-04 NOTE — DISCHARGE NOTE PROVIDER - NSDCCPTREATMENT_GEN_ALL_CORE_FT
PRINCIPAL PROCEDURE  Procedure: Implantation of biv ICD  Findings and Treatment: - Okay to shower   - Do not drive or operate heavy machinery for 1 week   - Do not submerge in water (example: baths, swimming) for 1 month.  - Do not lift your left arm greater than shoulder height for 6 weeks.  - Do not lift anything heavier than 5-10 lbs with your left arm for 6 weeks.  - Any sudden swelling, redness, fever, discharge, or severe pain, call the Electrophysiology Office at [phone].       PRINCIPAL PROCEDURE  Procedure: Implantation of biv ICD  Findings and Treatment: - Okay to shower   - Do not drive or operate heavy machinery for 1 week   - Do not submerge in water (example: baths, swimming) for 1 month.  - Do not lift your left arm greater than shoulder height for 6 weeks.  - Do not lift anything heavier than 5-10 lbs with your left arm for 6 weeks.  - Any sudden swelling, redness, fever, discharge, or severe pain, call the Electrophysiology Office at [phone].

## 2024-01-05 LAB
ALBUMIN SERPL ELPH-MCNC: 3.4 G/DL — LOW (ref 3.5–5.2)
ALBUMIN SERPL ELPH-MCNC: 3.4 G/DL — LOW (ref 3.5–5.2)
ALP SERPL-CCNC: 238 U/L — HIGH (ref 30–115)
ALP SERPL-CCNC: 238 U/L — HIGH (ref 30–115)
ALT FLD-CCNC: 22 U/L — SIGNIFICANT CHANGE UP (ref 0–41)
ALT FLD-CCNC: 22 U/L — SIGNIFICANT CHANGE UP (ref 0–41)
ANION GAP SERPL CALC-SCNC: 14 MMOL/L — SIGNIFICANT CHANGE UP (ref 7–14)
ANION GAP SERPL CALC-SCNC: 14 MMOL/L — SIGNIFICANT CHANGE UP (ref 7–14)
AST SERPL-CCNC: 34 U/L — SIGNIFICANT CHANGE UP (ref 0–41)
AST SERPL-CCNC: 34 U/L — SIGNIFICANT CHANGE UP (ref 0–41)
BILIRUB SERPL-MCNC: 1 MG/DL — SIGNIFICANT CHANGE UP (ref 0.2–1.2)
BILIRUB SERPL-MCNC: 1 MG/DL — SIGNIFICANT CHANGE UP (ref 0.2–1.2)
BUN SERPL-MCNC: 66 MG/DL — CRITICAL HIGH (ref 10–20)
BUN SERPL-MCNC: 66 MG/DL — CRITICAL HIGH (ref 10–20)
CALCIUM SERPL-MCNC: 8.7 MG/DL — SIGNIFICANT CHANGE UP (ref 8.4–10.5)
CALCIUM SERPL-MCNC: 8.7 MG/DL — SIGNIFICANT CHANGE UP (ref 8.4–10.5)
CHLORIDE SERPL-SCNC: 102 MMOL/L — SIGNIFICANT CHANGE UP (ref 98–110)
CHLORIDE SERPL-SCNC: 102 MMOL/L — SIGNIFICANT CHANGE UP (ref 98–110)
CO2 SERPL-SCNC: 20 MMOL/L — SIGNIFICANT CHANGE UP (ref 17–32)
CO2 SERPL-SCNC: 20 MMOL/L — SIGNIFICANT CHANGE UP (ref 17–32)
CREAT SERPL-MCNC: 2 MG/DL — HIGH (ref 0.7–1.5)
CREAT SERPL-MCNC: 2 MG/DL — HIGH (ref 0.7–1.5)
EGFR: 31 ML/MIN/1.73M2 — LOW
EGFR: 31 ML/MIN/1.73M2 — LOW
GLUCOSE BLDC GLUCOMTR-MCNC: 185 MG/DL — HIGH (ref 70–99)
GLUCOSE BLDC GLUCOMTR-MCNC: 185 MG/DL — HIGH (ref 70–99)
GLUCOSE BLDC GLUCOMTR-MCNC: 201 MG/DL — HIGH (ref 70–99)
GLUCOSE BLDC GLUCOMTR-MCNC: 201 MG/DL — HIGH (ref 70–99)
GLUCOSE BLDC GLUCOMTR-MCNC: 223 MG/DL — HIGH (ref 70–99)
GLUCOSE BLDC GLUCOMTR-MCNC: 223 MG/DL — HIGH (ref 70–99)
GLUCOSE BLDC GLUCOMTR-MCNC: 225 MG/DL — HIGH (ref 70–99)
GLUCOSE BLDC GLUCOMTR-MCNC: 225 MG/DL — HIGH (ref 70–99)
GLUCOSE SERPL-MCNC: 178 MG/DL — HIGH (ref 70–99)
GLUCOSE SERPL-MCNC: 178 MG/DL — HIGH (ref 70–99)
MAGNESIUM SERPL-MCNC: 2.1 MG/DL — SIGNIFICANT CHANGE UP (ref 1.8–2.4)
MAGNESIUM SERPL-MCNC: 2.1 MG/DL — SIGNIFICANT CHANGE UP (ref 1.8–2.4)
POTASSIUM SERPL-MCNC: 3.9 MMOL/L — SIGNIFICANT CHANGE UP (ref 3.5–5)
POTASSIUM SERPL-MCNC: 3.9 MMOL/L — SIGNIFICANT CHANGE UP (ref 3.5–5)
POTASSIUM SERPL-SCNC: 3.9 MMOL/L — SIGNIFICANT CHANGE UP (ref 3.5–5)
POTASSIUM SERPL-SCNC: 3.9 MMOL/L — SIGNIFICANT CHANGE UP (ref 3.5–5)
PROT SERPL-MCNC: 5.9 G/DL — LOW (ref 6–8)
PROT SERPL-MCNC: 5.9 G/DL — LOW (ref 6–8)
SODIUM SERPL-SCNC: 136 MMOL/L — SIGNIFICANT CHANGE UP (ref 135–146)
SODIUM SERPL-SCNC: 136 MMOL/L — SIGNIFICANT CHANGE UP (ref 135–146)

## 2024-01-05 PROCEDURE — 99232 SBSQ HOSP IP/OBS MODERATE 35: CPT

## 2024-01-05 RX ADMIN — ISOSORBIDE DINITRATE 5 MILLIGRAM(S): 5 TABLET ORAL at 05:31

## 2024-01-05 RX ADMIN — Medication 1: at 08:11

## 2024-01-05 RX ADMIN — Medication 81 MILLIGRAM(S): at 11:57

## 2024-01-05 RX ADMIN — ATORVASTATIN CALCIUM 40 MILLIGRAM(S): 80 TABLET, FILM COATED ORAL at 21:15

## 2024-01-05 RX ADMIN — CHLORHEXIDINE GLUCONATE 1 APPLICATION(S): 213 SOLUTION TOPICAL at 05:31

## 2024-01-05 RX ADMIN — Medication 2: at 11:56

## 2024-01-05 RX ADMIN — ISOSORBIDE DINITRATE 5 MILLIGRAM(S): 5 TABLET ORAL at 11:57

## 2024-01-05 RX ADMIN — Medication 10 MILLIGRAM(S): at 13:08

## 2024-01-05 RX ADMIN — Medication 10 MILLIGRAM(S): at 05:30

## 2024-01-05 RX ADMIN — APIXABAN 2.5 MILLIGRAM(S): 2.5 TABLET, FILM COATED ORAL at 21:16

## 2024-01-05 RX ADMIN — APIXABAN 2.5 MILLIGRAM(S): 2.5 TABLET, FILM COATED ORAL at 08:11

## 2024-01-05 RX ADMIN — TAMSULOSIN HYDROCHLORIDE 0.4 MILLIGRAM(S): 0.4 CAPSULE ORAL at 21:16

## 2024-01-05 RX ADMIN — Medication 2: at 17:06

## 2024-01-05 RX ADMIN — Medication 10 MILLIGRAM(S): at 21:16

## 2024-01-05 RX ADMIN — ISOSORBIDE DINITRATE 5 MILLIGRAM(S): 5 TABLET ORAL at 17:06

## 2024-01-05 RX ADMIN — GABAPENTIN 300 MILLIGRAM(S): 400 CAPSULE ORAL at 11:57

## 2024-01-05 RX ADMIN — Medication 20 MILLIGRAM(S): at 05:31

## 2024-01-05 RX ADMIN — Medication 50 MILLIGRAM(S): at 05:31

## 2024-01-05 RX ADMIN — SENNA PLUS 2 TABLET(S): 8.6 TABLET ORAL at 21:15

## 2024-01-05 NOTE — PROGRESS NOTE ADULT - TIME BILLING
Care coordination with CM  Discussion with family re: ROXANA versus home  Bedside evaluation and exam  Update for patient and son regarding delays in discharge

## 2024-01-05 NOTE — PROGRESS NOTE ADULT - SUBJECTIVE AND OBJECTIVE BOX
Chief complaint: Patient is a 89y old  Male who presents with a chief complaint of Cardiac Arrest (30 Dec 2023 12:13)    Interval history:  Doing well  No complaints  Awaiting Auth for ROXANA    Past medical history is notable for: DM, HTN, HLD, CAD s/p CABG    Review of systems: a complete 10- point review of systems was obtained and is negative except as stated in the interval history.    Vitals:  ICU Vital Signs Last 24 Hrs  T(C): 36.7 (05 Jan 2024 08:00), Max: 36.8 (05 Jan 2024 00:00)  T(F): 98 (05 Jan 2024 08:00), Max: 98.2 (05 Jan 2024 00:00)  HR: 69 (05 Jan 2024 13:11) (69 - 69)  BP: 119/57 (05 Jan 2024 13:11) (108/55 - 149/72)  BP(mean): 72 (05 Jan 2024 13:11) (72 - 104)  RR: 18 (05 Jan 2024 13:11) (13 - 23)  SpO2: 96% (05 Jan 2024 08:00) (96% - 99%)    O2 Parameters below as of 05 Jan 2024 08:00  Patient On (Oxygen Delivery Method): room air            Physical exam:  GENERAL: NAD, lying in bed comfortably  HEAD:  Atraumatic, normocephalic  EYES: EOMI, PERRLA, conjunctiva and sclera clear  ENT: Moist mucous membranes  NECK: Supple, no JVD  HEART: Regular rate and rhythm, no murmurs, rubs, or gallops, L chest ICD site with no swelling/hematoma, steri-strips c/d/i  LUNGS: Unlabored respirations. bilateral breath sounds are clear   ABDOMEN: Soft, nontender, nondistended, +BS  EXTREMITIES: 2+ peripheral pulses bilaterally. No clubbing, cyanosis, or edema  NERVOUS SYSTEM:  A&Ox3, no focal deficits   SKIN: No rashes or lesions    Data reviewed:  - Telemetry: V-paced underlying SR HR 69    - ECG < from: 12 Lead ECG (12.28.23 @ 15:05) >  Diagnosis Line Atrial fibrillation with slow ventricular response  Right axis deviation  Left bundle branch block  Abnormal ECG    - Echo < from: TTE Echo Complete w/o Contrast w/ Doppler (12.29.23 @ 09:30) >  Summary:   1. Severely decreased global left ventricular systolic function with a   biplane EF of 31%. Mild eccentric left ventricular hypertrophy. Diastolic   function is indeterminate.   2. Normal right ventricular size with moderately reduced systolic   function.   3. Mildly enlarged left atrium.   4. Normalright atrial size.   5. Mild to moderate mitral valve regurgitation.   6. Sclerotic aortic valve with decreased opening. Focal calcification   along the left coronary cusp.   7. Mild-moderate tricuspid regurgitation.   8. Mild pulmonic valve regurgitation.   9. Severe pulmonary hypertension (PASP = 66mmHg). The IVC is dilated and   non-collapsing, indicating increased right atrial pressure.  10. There is no evidence of pericardial effusion.      - Radiology:   Xray Chest 2 Views PA/Lat (12.30.23 @ 08:43) >  Impression:    Bilateral opacities, left basilar focal atelectasis and cardiomegaly,   unchanged.    CT angio chest ct abdomen pelvis No aortic intramural hematoma, aneurysm or focal dissection. 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 Cholelithiasis and small pericholecystic inflammatory changes, may  represent acute cholecystitis.   Right lung spiculated mass and additional upper lobe lesion, as   described. Findings are concerning for neoplastic process.        - Labs:                              Cultures:    Medications:  MEDICATIONS  (STANDING):  apixaban 2.5 milliGRAM(s) Oral every 12 hours  aspirin  chewable 81 milliGRAM(s) Oral daily  atorvastatin 40 milliGRAM(s) Oral at bedtime  cephalexin 500 milliGRAM(s) Oral every 12 hours  chlorhexidine 2% Cloths 1 Application(s) Topical <User Schedule>  dextrose 5%. 1000 milliLiter(s) (50 mL/Hr) IV Continuous <Continuous>  dextrose 5%. 1000 milliLiter(s) (100 mL/Hr) IV Continuous <Continuous>  dextrose 50% Injectable 25 Gram(s) IV Push once  dextrose 50% Injectable 25 Gram(s) IV Push once  dextrose 50% Injectable 12.5 Gram(s) IV Push once  gabapentin 300 milliGRAM(s) Oral daily  glucagon  Injectable 1 milliGRAM(s) IntraMuscular once  hydrALAZINE 10 milliGRAM(s) Oral three times a day  influenza  Vaccine (HIGH DOSE) 0.7 milliLiter(s) IntraMuscular once  insulin lispro (ADMELOG) corrective regimen sliding scale   SubCutaneous three times a day before meals  isosorbide   dinitrate Tablet (ISORDIL) 5 milliGRAM(s) Oral three times a day  metoprolol succinate ER 50 milliGRAM(s) Oral daily  senna 2 Tablet(s) Oral at bedtime  tamsulosin 0.4 milliGRAM(s) Oral at bedtime  torsemide 20 milliGRAM(s) Oral once    MEDICATIONS  (PRN):  acetaminophen     Tablet .. 650 milliGRAM(s) Oral every 8 hours PRN Moderate Pain (4 - 6)  dextrose Oral Gel 15 Gram(s) Oral once PRN Blood Glucose LESS THAN 70 milliGRAM(s)/deciliter

## 2024-01-05 NOTE — PROGRESS NOTE ADULT - ASSESSMENT
Assessment:   89 year old Male with history of CAD s/p CABG presented with atrial flutter with CHB c/b brief cardiac arrest, now s/p placement of BiV ICD on 12/29 . TTE with LVE 31%, moderately reduced RV function, mild-moderate TR, and severe pHTN with dilated and noncollapsing IVC. Hospital course has been c/b MAUREEN (peaked Cr 3.1), most likely ATN in the setting of arrest and JOSE.     #High-degree AV block / transient CHB  Transient CHB with brief  in-hospital cardiac arrest  s/p TVP in CCU  ECHO Severe LV dysfunction. EF 31%. Severe pulmonary hypertension (PASP = 66mmHg). The IVC is dilated and non-collapsing, indicating increased right atrial pressure.  -  SP BiV ICD Implant 12/29. CXR done with no pneumothorax.  Interrogation completed, numbers within appropriate range        # Atrial fibrillation  - cont  Toprol 50mg 12/31  - Cont eliquis 2.5mg po BID   # CAD s/p CABG  # NSTEMI (possibly type II secondary to cardiac arrest / underlying CAD)  #ICM (severe dysfunction)-->new   #HTN  on admission troponin 4152  - cont BB , cont on ASA 81MG daily  - Continue on hydralazine 10 mg TID and isordil 5mg TID  - POCUS IVC 1.7 cm & < 50% collapsing .   - cont holding ACE/ARB 2/2 MAUREEN (although improving)  - Cont Lasix 20mg PO daily.  - f/u with Dr. Jorge in office on 1/12th       #MAUREEN   - Creat continues to impvoe  - Avoid nephrotoxins      # Right lung mass possibly neoplastic note don CT scan   - CT angio 12/28 Right lung spiculated mass and additional upper lobe lesion, as described. Findings are concerning for neoplastic process.  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 diabetic neuropathy  A1C 7.1   - Cont on ISS while inpatient ( hold home PO diabetic meds for now d/t MAUREEN )   - F.S ACHS   - cont on gabapentin 300mg po daily     #HLD  - cont on atorvastatin 40mg po  - LDL 53    # BPH  - Cont on tamsulosin 0.4mg  - strict I & O        Dispo: ROXANA pending authorization

## 2024-01-06 LAB
GLUCOSE BLDC GLUCOMTR-MCNC: 168 MG/DL — HIGH (ref 70–99)
GLUCOSE BLDC GLUCOMTR-MCNC: 168 MG/DL — HIGH (ref 70–99)
GLUCOSE BLDC GLUCOMTR-MCNC: 180 MG/DL — HIGH (ref 70–99)
GLUCOSE BLDC GLUCOMTR-MCNC: 180 MG/DL — HIGH (ref 70–99)
GLUCOSE BLDC GLUCOMTR-MCNC: 193 MG/DL — HIGH (ref 70–99)
GLUCOSE BLDC GLUCOMTR-MCNC: 193 MG/DL — HIGH (ref 70–99)
GLUCOSE BLDC GLUCOMTR-MCNC: 237 MG/DL — HIGH (ref 70–99)
GLUCOSE BLDC GLUCOMTR-MCNC: 237 MG/DL — HIGH (ref 70–99)
RAPID RVP RESULT: SIGNIFICANT CHANGE UP
RAPID RVP RESULT: SIGNIFICANT CHANGE UP
SARS-COV-2 RNA SPEC QL NAA+PROBE: SIGNIFICANT CHANGE UP
SARS-COV-2 RNA SPEC QL NAA+PROBE: SIGNIFICANT CHANGE UP

## 2024-01-06 PROCEDURE — 99232 SBSQ HOSP IP/OBS MODERATE 35: CPT

## 2024-01-06 RX ADMIN — APIXABAN 2.5 MILLIGRAM(S): 2.5 TABLET, FILM COATED ORAL at 08:08

## 2024-01-06 RX ADMIN — Medication 10 MILLIGRAM(S): at 15:54

## 2024-01-06 RX ADMIN — ISOSORBIDE DINITRATE 5 MILLIGRAM(S): 5 TABLET ORAL at 05:56

## 2024-01-06 RX ADMIN — Medication 1: at 17:08

## 2024-01-06 RX ADMIN — SENNA PLUS 2 TABLET(S): 8.6 TABLET ORAL at 21:47

## 2024-01-06 RX ADMIN — Medication 10 MILLIGRAM(S): at 21:47

## 2024-01-06 RX ADMIN — CHLORHEXIDINE GLUCONATE 1 APPLICATION(S): 213 SOLUTION TOPICAL at 05:55

## 2024-01-06 RX ADMIN — Medication 1: at 08:07

## 2024-01-06 RX ADMIN — Medication 20 MILLIGRAM(S): at 05:56

## 2024-01-06 RX ADMIN — TAMSULOSIN HYDROCHLORIDE 0.4 MILLIGRAM(S): 0.4 CAPSULE ORAL at 21:47

## 2024-01-06 RX ADMIN — Medication 1: at 11:27

## 2024-01-06 RX ADMIN — ATORVASTATIN CALCIUM 40 MILLIGRAM(S): 80 TABLET, FILM COATED ORAL at 21:48

## 2024-01-06 RX ADMIN — Medication 81 MILLIGRAM(S): at 11:27

## 2024-01-06 RX ADMIN — Medication 50 MILLIGRAM(S): at 05:56

## 2024-01-06 RX ADMIN — ISOSORBIDE DINITRATE 5 MILLIGRAM(S): 5 TABLET ORAL at 17:08

## 2024-01-06 RX ADMIN — GABAPENTIN 300 MILLIGRAM(S): 400 CAPSULE ORAL at 11:26

## 2024-01-06 RX ADMIN — Medication 10 MILLIGRAM(S): at 05:56

## 2024-01-06 RX ADMIN — ISOSORBIDE DINITRATE 5 MILLIGRAM(S): 5 TABLET ORAL at 11:27

## 2024-01-06 NOTE — PROGRESS NOTE ADULT - SUBJECTIVE AND OBJECTIVE BOX
Chief complaint: Patient is a 89y old  Male who presents with a chief complaint of Cardiac Arrest (30 Dec 2023 12:13)    Interval history:  Doing well  No complaints  Awaiting Auth for ROXANA    Past medical history is notable for: DM, HTN, HLD, CAD s/p CABG    Review of systems: a complete 10- point review of systems was obtained and is negative except as stated in the interval history.    Vitals:  ICU Vital Signs Last 24 Hrs  T(C): 36.7 (05 Jan 2024 08:00), Max: 36.8 (05 Jan 2024 00:00)  T(F): 98 (05 Jan 2024 08:00), Max: 98.2 (05 Jan 2024 00:00)  HR: 69 (05 Jan 2024 13:11) (69 - 69)  BP: 119/57 (05 Jan 2024 13:11) (108/55 - 149/72)  BP(mean): 72 (05 Jan 2024 13:11) (72 - 104)  RR: 18 (05 Jan 2024 13:11) (13 - 23)  SpO2: 96% (05 Jan 2024 08:00) (96% - 99%)    O2 Parameters below as of 05 Jan 2024 08:00  Patient On (Oxygen Delivery Method): room air            Physical exam:  GENERAL: NAD, lying in bed comfortably  HEAD:  Atraumatic, normocephalic  EYES: EOMI, PERRLA, conjunctiva and sclera clear  ENT: Moist mucous membranes  NECK: Supple, no JVD  HEART: Regular rate and rhythm, no murmurs, rubs, or gallops, L chest ICD site with no swelling/hematoma, steri-strips c/d/i  LUNGS: Unlabored respirations. bilateral breath sounds are clear   ABDOMEN: Soft, nontender, nondistended, +BS  EXTREMITIES: 2+ peripheral pulses bilaterally. No clubbing, cyanosis, or edema  NERVOUS SYSTEM:  A&Ox3, no focal deficits   SKIN: No rashes or lesions    Data reviewed:  - Telemetry: V-paced underlying SR HR 69    - ECG < from: 12 Lead ECG (12.28.23 @ 15:05) >  Diagnosis Line Atrial fibrillation with slow ventricular response  Right axis deviation  Left bundle branch block  Abnormal ECG    - Echo < from: TTE Echo Complete w/o Contrast w/ Doppler (12.29.23 @ 09:30) >  Summary:   1. Severely decreased global left ventricular systolic function with a   biplane EF of 31%. Mild eccentric left ventricular hypertrophy. Diastolic   function is indeterminate.   2. Normal right ventricular size with moderately reduced systolic   function.   3. Mildly enlarged left atrium.   4. Normalright atrial size.   5. Mild to moderate mitral valve regurgitation.   6. Sclerotic aortic valve with decreased opening. Focal calcification   along the left coronary cusp.   7. Mild-moderate tricuspid regurgitation.   8. Mild pulmonic valve regurgitation.   9. Severe pulmonary hypertension (PASP = 66mmHg). The IVC is dilated and   non-collapsing, indicating increased right atrial pressure.  10. There is no evidence of pericardial effusion.      - Radiology:   Xray Chest 2 Views PA/Lat (12.30.23 @ 08:43) >  Impression:    Bilateral opacities, left basilar focal atelectasis and cardiomegaly,   unchanged.    CT angio chest ct abdomen pelvis No aortic intramural hematoma, aneurysm or focal dissection. 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 Cholelithiasis and small pericholecystic inflammatory changes, may  represent acute cholecystitis.   Right lung spiculated mass and additional upper lobe lesion, as   described. Findings are concerning for neoplastic process.        - Labs:                              Cultures:    Medications:  MEDICATIONS  (STANDING):  apixaban 2.5 milliGRAM(s) Oral every 12 hours  aspirin  chewable 81 milliGRAM(s) Oral daily  atorvastatin 40 milliGRAM(s) Oral at bedtime  cephalexin 500 milliGRAM(s) Oral every 12 hours  chlorhexidine 2% Cloths 1 Application(s) Topical <User Schedule>  dextrose 5%. 1000 milliLiter(s) (50 mL/Hr) IV Continuous <Continuous>  dextrose 5%. 1000 milliLiter(s) (100 mL/Hr) IV Continuous <Continuous>  dextrose 50% Injectable 25 Gram(s) IV Push once  dextrose 50% Injectable 25 Gram(s) IV Push once  dextrose 50% Injectable 12.5 Gram(s) IV Push once  gabapentin 300 milliGRAM(s) Oral daily  glucagon  Injectable 1 milliGRAM(s) IntraMuscular once  hydrALAZINE 10 milliGRAM(s) Oral three times a day  influenza  Vaccine (HIGH DOSE) 0.7 milliLiter(s) IntraMuscular once  insulin lispro (ADMELOG) corrective regimen sliding scale   SubCutaneous three times a day before meals  isosorbide   dinitrate Tablet (ISORDIL) 5 milliGRAM(s) Oral three times a day  metoprolol succinate ER 50 milliGRAM(s) Oral daily  senna 2 Tablet(s) Oral at bedtime  tamsulosin 0.4 milliGRAM(s) Oral at bedtime  torsemide 20 milliGRAM(s) Oral once    MEDICATIONS  (PRN):  acetaminophen     Tablet .. 650 milliGRAM(s) Oral every 8 hours PRN Moderate Pain (4 - 6)  dextrose Oral Gel 15 Gram(s) Oral once PRN Blood Glucose LESS THAN 70 milliGRAM(s)/deciliter                 Chief complaint: Patient is a 89y old  Male who presents with a chief complaint of Cardiac Arrest (30 Dec 2023 12:13)    Interval history:  Seen and examined patient at bedside this AM.   Pt is OOB to chair, without complaints.   Denies CP, SOB, dizziness. No overnight events.    Past medical history is notable for: DM, HTN, HLD, CAD s/p CABG    Review of systems: a complete 10- point review of systems was obtained and is negative except as stated in the interval history.    Vitals:  T(C): 36.7 (06 Jan 2024 15:18), Max: 37 (06 Jan 2024 00:00)  T(F): 98 (06 Jan 2024 15:18), Max: 98.6 (06 Jan 2024 00:00)  HR: 69 (06 Jan 2024 15:18) (69 - 69)  BP: 129/60 (06 Jan 2024 15:18) (115/59 - 152/73)  BP(mean): 87 (06 Jan 2024 15:18) (82 - 105)  RR: 18 (06 Jan 2024 15:18) (13 - 20)  SpO2: 98% (06 Jan 2024 11:30) (97% - 100%)    O2 Parameters below as of 06 Jan 2024 11:30  Patient On (Oxygen Delivery Method): room air      Physical exam:  GENERAL: NAD, sitting up in chair.  HEAD:  Atraumatic, normocephalic  EYES: EOMI, PERRLA, conjunctiva and sclera clear  ENT: Moist mucous membranes  NECK: Supple, no JVD  HEART: Regular rate and rhythm, no murmurs, rubs, or gallops, L chest ICD site with no swelling/hematoma, steri-strips c/d/i  LUNGS: Unlabored respirations. bilateral breath sounds are clear   ABDOMEN: Soft, nontender, nondistended, +BS  EXTREMITIES: 2+ peripheral pulses bilaterally. No clubbing, cyanosis, or edema  NERVOUS SYSTEM:  A&Ox3, no focal deficits   SKIN: No rashes or lesions    Data reviewed:  - Telemetry: V-paced underlying SR HR 69    - ECG < from: 12 Lead ECG (12.28.23 @ 15:05) >  Diagnosis Line Atrial fibrillation with slow ventricular response  Right axis deviation  Left bundle branch block  Abnormal ECG    - Echo < from: TTE Echo Complete w/o Contrast w/ Doppler (12.29.23 @ 09:30) >  Summary:   1. Severely decreased global left ventricular systolic function with a   biplane EF of 31%. Mild eccentric left ventricular hypertrophy. Diastolic   function is indeterminate.   2. Normal right ventricular size with moderately reduced systolic   function.   3. Mildly enlarged left atrium.   4. Normalright atrial size.   5. Mild to moderate mitral valve regurgitation.   6. Sclerotic aortic valve with decreased opening. Focal calcification   along the left coronary cusp.   7. Mild-moderate tricuspid regurgitation.   8. Mild pulmonic valve regurgitation.   9. Severe pulmonary hypertension (PASP = 66mmHg). The IVC is dilated and   non-collapsing, indicating increased right atrial pressure.  10. There is no evidence of pericardial effusion.      - Radiology:   Xray Chest 2 Views PA/Lat (12.30.23 @ 08:43) >  Impression:    Bilateral opacities, left basilar focal atelectasis and cardiomegaly,   unchanged.    CT angio chest ct abdomen pelvis No aortic intramural hematoma, aneurysm or focal dissection. 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 Cholelithiasis and small pericholecystic inflammatory changes, may  represent acute cholecystitis.   Right lung spiculated mass and additional upper lobe lesion, as   described. Findings are concerning for neoplastic process.        - Labs:                           Cultures:    Medications:  MEDICATIONS  (STANDING):  apixaban 2.5 milliGRAM(s) Oral every 12 hours  aspirin  chewable 81 milliGRAM(s) Oral daily  atorvastatin 40 milliGRAM(s) Oral at bedtime  cephalexin 500 milliGRAM(s) Oral every 12 hours  chlorhexidine 2% Cloths 1 Application(s) Topical <User Schedule>  dextrose 5%. 1000 milliLiter(s) (50 mL/Hr) IV Continuous <Continuous>  dextrose 5%. 1000 milliLiter(s) (100 mL/Hr) IV Continuous <Continuous>  dextrose 50% Injectable 25 Gram(s) IV Push once  dextrose 50% Injectable 25 Gram(s) IV Push once  dextrose 50% Injectable 12.5 Gram(s) IV Push once  gabapentin 300 milliGRAM(s) Oral daily  glucagon  Injectable 1 milliGRAM(s) IntraMuscular once  hydrALAZINE 10 milliGRAM(s) Oral three times a day  influenza  Vaccine (HIGH DOSE) 0.7 milliLiter(s) IntraMuscular once  insulin lispro (ADMELOG) corrective regimen sliding scale   SubCutaneous three times a day before meals  isosorbide   dinitrate Tablet (ISORDIL) 5 milliGRAM(s) Oral three times a day  metoprolol succinate ER 50 milliGRAM(s) Oral daily  senna 2 Tablet(s) Oral at bedtime  tamsulosin 0.4 milliGRAM(s) Oral at bedtime  torsemide 20 milliGRAM(s) Oral once    MEDICATIONS  (PRN):  acetaminophen     Tablet .. 650 milliGRAM(s) Oral every 8 hours PRN Moderate Pain (4 - 6)  dextrose Oral Gel 15 Gram(s) Oral once PRN Blood Glucose LESS THAN 70 milliGRAM(s)/deciliter

## 2024-01-06 NOTE — PROGRESS NOTE ADULT - ASSESSMENT
Assessment:   89 year old Male with history of CAD s/p CABG presented with atrial flutter with CHB c/b brief cardiac arrest, now s/p placement of BiV ICD on 12/29 . TTE with LVE 31%, moderately reduced RV function, mild-moderate TR, and severe pHTN with dilated and noncollapsing IVC. Hospital course has been c/b MAUREEN (peaked Cr 3.1), most likely ATN in the setting of arrest and JOSE.     #High-degree AV block / transient CHB  Transient CHB with brief  in-hospital cardiac arrest  s/p TVP in CCU  ECHO Severe LV dysfunction. EF 31%. Severe pulmonary hypertension (PASP = 66mmHg). The IVC is dilated and non-collapsing, indicating increased right atrial pressure.  -  SP BiV ICD Implant 12/29. CXR done with no pneumothorax.  Interrogation completed, numbers within appropriate range        # Atrial fibrillation  - cont  Toprol 50mg 12/31  - Cont eliquis 2.5mg po BID   # CAD s/p CABG  # NSTEMI (possibly type II secondary to cardiac arrest / underlying CAD)  #ICM (severe dysfunction)-->new   #HTN  on admission troponin 4152  - cont BB , cont on ASA 81MG daily  - Continue on hydralazine 10 mg TID and isordil 5mg TID  - POCUS IVC 1.7 cm & < 50% collapsing .   - cont holding ACE/ARB 2/2 MAUREEN (although improving)  - Cont Lasix 20mg PO daily.  - f/u with Dr. Jorge in office on 1/12th       #MAUREEN   - Creat continues to impvoe  - Avoid nephrotoxins      # Right lung mass possibly neoplastic note don CT scan   - CT angio 12/28 Right lung spiculated mass and additional upper lobe lesion, as described. Findings are concerning for neoplastic process.  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 diabetic neuropathy  A1C 7.1   - Cont on ISS while inpatient ( hold home PO diabetic meds for now d/t MAUREEN )   - F.S ACHS   - cont on gabapentin 300mg po daily     #HLD  - cont on atorvastatin 40mg po  - LDL 53    # BPH  - Cont on tamsulosin 0.4mg  - strict I & O        Dispo: ROXANA pending authorization Assessment:   89 year old Male with history of CAD s/p CABG presented with atrial flutter with CHB c/b brief cardiac arrest, now s/p placement of BiV ICD on 12/29 . TTE with LVE 31%, moderately reduced RV function, mild-moderate TR, and severe pHTN with dilated and noncollapsing IVC. Hospital course has been c/b MAUREEN (peaked Cr 3.1), most likely ATN in the setting of arrest and OJSE. Awaiting authorization for ROXANA.    #High-degree AV block / transient CHB  Transient CHB with brief  in-hospital cardiac arrest  s/p TVP in CCU  ECHO Severe LV dysfunction. EF 31%. Severe pulmonary hypertension (PASP = 66mmHg). The IVC is dilated and non-collapsing, indicating increased right atrial pressure.  -  SP BiV ICD Implant 12/29. CXR done with no pneumothorax.  Interrogation completed, numbers within appropriate range  - if pt stays until Tuesday, plan for LHC on Monday 1/8 if Cr returns to baseline.       # Atrial fibrillation  - cont  Toprol 50mg 12/31  - Hold eliquis 2.5mg po BID   # CAD s/p CABG  # NSTEMI (possibly type II secondary to cardiac arrest / underlying CAD)  #ICM (severe dysfunction)-->new   #HTN  on admission troponin 4152  - cont BB , cont on ASA 81MG daily  - Continue on hydralazine 10 mg TID and isordil 5mg TID  - POCUS IVC 1.7 cm & < 50% collapsing .   - cont holding ACE/ARB 2/2 MAUREEN (although improving)  - Cont Lasix 20mg PO daily.  - f/u with Dr. Jorge in office on 1/12th       #MAUREEN   - Creat continues to improve  - Avoid nephrotoxins  - f/u BMP 1/7 AM       # Right lung mass possibly neoplastic note don CT scan   - CT angio 12/28 Right lung spiculated mass and additional upper lobe lesion, as described. Findings are concerning for neoplastic process.  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 diabetic neuropathy  A1C 7.1   - Cont on ISS while inpatient ( hold home PO diabetic meds for now d/t MAUREEN )   - F.S ACHS   - cont on gabapentin 300mg po daily     #HLD  - cont on atorvastatin 40mg po  - LDL 53    # BPH  - Cont on tamsulosin 0.4mg  - strict I & O      Dispo: ROXANA pending authorization Assessment:   89 year old Male with history of CAD s/p CABG presented with atrial flutter with CHB c/b brief cardiac arrest, now s/p placement of BiV ICD on 12/29 . TTE with LVE 31%, moderately reduced RV function, mild-moderate TR, and severe pHTN with dilated and noncollapsing IVC. Hospital course has been c/b MAUREEN (peaked Cr 3.1), most likely ATN in the setting of arrest and JOSE. Awaiting authorization for ROXANA.    #High-degree AV block / transient CHB  Transient CHB with brief  in-hospital cardiac arrest  s/p TVP in CCU  ECHO Severe LV dysfunction. EF 31%. Severe pulmonary hypertension (PASP = 66mmHg). The IVC is dilated and non-collapsing, indicating increased right atrial pressure.  -  SP BiV ICD Implant 12/29. CXR done with no pneumothorax.  Interrogation completed, numbers within appropriate range  - if pt stays until Tuesday, plan for LHC on Monday 1/8 if Cr returns to baseline.       # Atrial fibrillation  - cont  Toprol 50mg 12/31  - Hold eliquis 2.5mg po BID   # CAD s/p CABG  # NSTEMI (possibly type II secondary to cardiac arrest / underlying CAD)  #ICM (severe dysfunction)-->new   #HTN  on admission troponin 4152  - cont BB , cont on ASA 81MG daily  - Continue on hydralazine 10 mg TID and isordil 5mg TID  - POCUS IVC 1.7 cm & < 50% collapsing .   - cont holding ACE/ARB 2/2 MAUREEN (although improving)  - Cont Lasix 20mg PO daily.  - f/u with Dr. Jorge in office on 1/12th       #MAUREEN   - Creat continues to improve  - Avoid nephrotoxins  - f/u BMP 1/7 AM       # Right lung mass possibly neoplastic note don CT scan   - CT angio 12/28 Right lung spiculated mass and additional upper lobe lesion, as described. Findings are concerning for neoplastic process.  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 diabetic neuropathy  A1C 7.1   - Cont on ISS while inpatient ( hold home PO diabetic meds for now d/t MAUREEN )   - F.S ACHS   - cont on gabapentin 300mg po daily     #HLD  - cont on atorvastatin 40mg po  - LDL 53    # BPH  - Cont on tamsulosin 0.4mg  - strict I & O      Dispo: ROXANA pending authorization

## 2024-01-07 LAB
ANION GAP SERPL CALC-SCNC: 12 MMOL/L — SIGNIFICANT CHANGE UP (ref 7–14)
ANION GAP SERPL CALC-SCNC: 12 MMOL/L — SIGNIFICANT CHANGE UP (ref 7–14)
BUN SERPL-MCNC: 54 MG/DL — HIGH (ref 10–20)
BUN SERPL-MCNC: 54 MG/DL — HIGH (ref 10–20)
CALCIUM SERPL-MCNC: 8.8 MG/DL — SIGNIFICANT CHANGE UP (ref 8.4–10.5)
CALCIUM SERPL-MCNC: 8.8 MG/DL — SIGNIFICANT CHANGE UP (ref 8.4–10.5)
CHLORIDE SERPL-SCNC: 104 MMOL/L — SIGNIFICANT CHANGE UP (ref 98–110)
CHLORIDE SERPL-SCNC: 104 MMOL/L — SIGNIFICANT CHANGE UP (ref 98–110)
CO2 SERPL-SCNC: 20 MMOL/L — SIGNIFICANT CHANGE UP (ref 17–32)
CO2 SERPL-SCNC: 20 MMOL/L — SIGNIFICANT CHANGE UP (ref 17–32)
CREAT SERPL-MCNC: 1.7 MG/DL — HIGH (ref 0.7–1.5)
CREAT SERPL-MCNC: 1.7 MG/DL — HIGH (ref 0.7–1.5)
EGFR: 38 ML/MIN/1.73M2 — LOW
EGFR: 38 ML/MIN/1.73M2 — LOW
GLUCOSE BLDC GLUCOMTR-MCNC: 197 MG/DL — HIGH (ref 70–99)
GLUCOSE BLDC GLUCOMTR-MCNC: 197 MG/DL — HIGH (ref 70–99)
GLUCOSE BLDC GLUCOMTR-MCNC: 234 MG/DL — HIGH (ref 70–99)
GLUCOSE BLDC GLUCOMTR-MCNC: 234 MG/DL — HIGH (ref 70–99)
GLUCOSE BLDC GLUCOMTR-MCNC: 242 MG/DL — HIGH (ref 70–99)
GLUCOSE BLDC GLUCOMTR-MCNC: 242 MG/DL — HIGH (ref 70–99)
GLUCOSE BLDC GLUCOMTR-MCNC: 243 MG/DL — HIGH (ref 70–99)
GLUCOSE BLDC GLUCOMTR-MCNC: 243 MG/DL — HIGH (ref 70–99)
GLUCOSE SERPL-MCNC: 193 MG/DL — HIGH (ref 70–99)
GLUCOSE SERPL-MCNC: 193 MG/DL — HIGH (ref 70–99)
POTASSIUM SERPL-MCNC: 3.7 MMOL/L — SIGNIFICANT CHANGE UP (ref 3.5–5)
POTASSIUM SERPL-MCNC: 3.7 MMOL/L — SIGNIFICANT CHANGE UP (ref 3.5–5)
POTASSIUM SERPL-SCNC: 3.7 MMOL/L — SIGNIFICANT CHANGE UP (ref 3.5–5)
POTASSIUM SERPL-SCNC: 3.7 MMOL/L — SIGNIFICANT CHANGE UP (ref 3.5–5)
SODIUM SERPL-SCNC: 136 MMOL/L — SIGNIFICANT CHANGE UP (ref 135–146)
SODIUM SERPL-SCNC: 136 MMOL/L — SIGNIFICANT CHANGE UP (ref 135–146)

## 2024-01-07 PROCEDURE — 99232 SBSQ HOSP IP/OBS MODERATE 35: CPT

## 2024-01-07 RX ORDER — APIXABAN 2.5 MG/1
2.5 TABLET, FILM COATED ORAL EVERY 12 HOURS
Refills: 0 | Status: DISCONTINUED | OUTPATIENT
Start: 2024-01-07 | End: 2024-01-09

## 2024-01-07 RX ADMIN — TAMSULOSIN HYDROCHLORIDE 0.4 MILLIGRAM(S): 0.4 CAPSULE ORAL at 21:12

## 2024-01-07 RX ADMIN — Medication 2: at 17:17

## 2024-01-07 RX ADMIN — Medication 81 MILLIGRAM(S): at 11:48

## 2024-01-07 RX ADMIN — APIXABAN 2.5 MILLIGRAM(S): 2.5 TABLET, FILM COATED ORAL at 17:15

## 2024-01-07 RX ADMIN — ISOSORBIDE DINITRATE 5 MILLIGRAM(S): 5 TABLET ORAL at 17:15

## 2024-01-07 RX ADMIN — Medication 10 MILLIGRAM(S): at 13:14

## 2024-01-07 RX ADMIN — Medication 20 MILLIGRAM(S): at 06:46

## 2024-01-07 RX ADMIN — Medication 10 MILLIGRAM(S): at 06:46

## 2024-01-07 RX ADMIN — Medication 2: at 11:52

## 2024-01-07 RX ADMIN — Medication 50 MILLIGRAM(S): at 06:46

## 2024-01-07 RX ADMIN — ATORVASTATIN CALCIUM 40 MILLIGRAM(S): 80 TABLET, FILM COATED ORAL at 21:12

## 2024-01-07 RX ADMIN — Medication 1: at 08:38

## 2024-01-07 RX ADMIN — ISOSORBIDE DINITRATE 5 MILLIGRAM(S): 5 TABLET ORAL at 06:46

## 2024-01-07 RX ADMIN — ISOSORBIDE DINITRATE 5 MILLIGRAM(S): 5 TABLET ORAL at 11:48

## 2024-01-07 RX ADMIN — Medication 10 MILLIGRAM(S): at 21:12

## 2024-01-07 RX ADMIN — GABAPENTIN 300 MILLIGRAM(S): 400 CAPSULE ORAL at 11:48

## 2024-01-07 RX ADMIN — CHLORHEXIDINE GLUCONATE 1 APPLICATION(S): 213 SOLUTION TOPICAL at 06:45

## 2024-01-07 NOTE — PROGRESS NOTE ADULT - ASSESSMENT
Assessment:   89 year old Male with history of CAD s/p CABG presented with atrial flutter with CHB c/b brief cardiac arrest, now s/p placement of BiV ICD on 12/29 . TTE with LVE 31%, moderately reduced RV function, mild-moderate TR, and severe pHTN with dilated and noncollapsing IVC. Hospital course has been c/b MAUREEN (peaked Cr 3.1), most likely ATN in the setting of arrest and JOSE. Awaiting authorization for ROXANA.    #High-degree AV block / transient CHB  Transient CHB with brief  in-hospital cardiac arrest  s/p TVP in CCU  ECHO Severe LV dysfunction. EF 31%. Severe pulmonary hypertension (PASP = 66mmHg). The IVC is dilated and non-collapsing, indicating increased right atrial pressure.  -  SP BiV ICD Implant 12/29. CXR done with no pneumothorax.  Interrogation completed, numbers within appropriate range    # Atrial fibrillation  - cont  Toprol 50mg 12/31  - eliquis 2.5mg po BID--> currenlt on hold fopr possible LHC     # CAD s/p CABG  NSTEMI (possibly type II secondary to cardiac arrest / underlying CAD)  New LV dysfunction  -Possible LHC this admission if renal function remains stable  -Eliquis on hold  - cont BB , cont on ASA 81MG daily  - Continue on hydralazine 10 mg TID and isordil 5mg TID    #HFrEF 2/2 ICMP  - POCUS IVC 1.7 cm & < 50% collapsing .   - cont holding ACE/ARB 2/2 MAUREEN (although improving)  - Cont Lasix 20mg PO daily.  - f/u with Dr. 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 Findings are concerning for neoplastic process.  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 diabetic neuropathy  A1C 7.1   - Cont on ISS while inpatient ( hold home PO diabetic meds for now d/t MAUREEN )   - F.S ACHS   - cont on gabapentin 300mg po daily     #HLD  - cont on atorvastatin 40mg po  - LDL 53    # BPH  - Cont on tamsulosin 0.4mg  - strict I & O      Dispo: ROXANA pending authorization

## 2024-01-07 NOTE — PROGRESS NOTE ADULT - SUBJECTIVE AND OBJECTIVE BOX
Chief complaint: Patient is a 89y old  Male who presents with a chief complaint ofCHF     Interval history:  Seen and examined patient at bedside this AM.   No new complaints  Doing well  Denies CP, SOB, dizziness. No overnight events.    Past medical history is notable for: DM, HTN, HLD, CAD s/p CABG    Review of systems: a complete 10- point review of systems was obtained and is negative except as stated in the interval history.    Vitals:  ICU Vital Signs Last 24 Hrs  T(C): 36.7 (07 Jan 2024 07:21), Max: 37 (07 Jan 2024 00:03)  T(F): 98 (07 Jan 2024 07:21), Max: 98.6 (07 Jan 2024 00:03)  HR: 69 (07 Jan 2024 07:21) (69 - 71)  BP: 124/60 (07 Jan 2024 07:21) (124/60 - 146/68)  BP(mean): 86 (07 Jan 2024 07:21) (86 - 108)  RR: 18 (07 Jan 2024 07:21) (18 - 18)  SpO2: 98% (07 Jan 2024 07:21) (97% - 98%)    O2 Parameters below as of 07 Jan 2024 07:21  Patient On (Oxygen Delivery Method): room air              Physical exam:  GENERAL: NAD, sitting up in chair.  HEAD:  Atraumatic, normocephalic  EYES: EOMI, PERRLA, conjunctiva and sclera clear  ENT: Moist mucous membranes  NECK: Supple, no JVD  HEART: Regular rate and rhythm, no murmurs, rubs, or gallops, L chest ICD site with no swelling/hematoma, steri-strips c/d/i  LUNGS: Unlabored respirations. bilateral breath sounds are clear   ABDOMEN: Soft, nontender, nondistended, +BS  EXTREMITIES: 2+ peripheral pulses bilaterally. No clubbing, cyanosis, or edema  NERVOUS SYSTEM:  A&Ox3, no focal deficits   SKIN: No rashes or lesions    Data reviewed:  - Telemetry: V-paced underlying SR HR 69    - ECG < from: 12 Lead ECG (12.28.23 @ 15:05) >  Diagnosis Line Atrial fibrillation with slow ventricular response  Right axis deviation  Left bundle branch block  Abnormal ECG    - Echo < from: TTE Echo Complete w/o Contrast w/ Doppler (12.29.23 @ 09:30) >  Summary:   1. Severely decreased global left ventricular systolic function with a   biplane EF of 31%. Mild eccentric left ventricular hypertrophy. Diastolic   function is indeterminate.   2. Normal right ventricular size with moderately reduced systolic   function.   3. Mildly enlarged left atrium.   4. Normalright atrial size.   5. Mild to moderate mitral valve regurgitation.   6. Sclerotic aortic valve with decreased opening. Focal calcification   along the left coronary cusp.   7. Mild-moderate tricuspid regurgitation.   8. Mild pulmonic valve regurgitation.   9. Severe pulmonary hypertension (PASP = 66mmHg). The IVC is dilated and   non-collapsing, indicating increased right atrial pressure.  10. There is no evidence of pericardial effusion.      - Radiology:   Xray Chest 2 Views PA/Lat (12.30.23 @ 08:43) >  Impression:    Bilateral opacities, left basilar focal atelectasis and cardiomegaly,   unchanged.    CT angio chest ct abdomen pelvis No aortic intramural hematoma, aneurysm or focal dissection. 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 Cholelithiasis and small pericholecystic inflammatory changes, may  represent acute cholecystitis.   Right lung spiculated mass and additional upper lobe lesion, as   described. Findings are concerning for neoplastic process.        - Labs:                           Cultures:    Medications:  MEDICATIONS  (STANDING):  MEDICATIONS  (STANDING):  aspirin  chewable 81 milliGRAM(s) Oral daily  atorvastatin 40 milliGRAM(s) Oral at bedtime  chlorhexidine 2% Cloths 1 Application(s) Topical <User Schedule>  dextrose 5%. 1000 milliLiter(s) (100 mL/Hr) IV Continuous <Continuous>  dextrose 5%. 1000 milliLiter(s) (50 mL/Hr) IV Continuous <Continuous>  dextrose 50% Injectable 25 Gram(s) IV Push once  dextrose 50% Injectable 12.5 Gram(s) IV Push once  dextrose 50% Injectable 25 Gram(s) IV Push once  furosemide    Tablet 20 milliGRAM(s) Oral daily  gabapentin 300 milliGRAM(s) Oral daily  glucagon  Injectable 1 milliGRAM(s) IntraMuscular once  hydrALAZINE 10 milliGRAM(s) Oral three times a day  influenza  Vaccine (HIGH DOSE) 0.7 milliLiter(s) IntraMuscular once  insulin lispro (ADMELOG) corrective regimen sliding scale   SubCutaneous three times a day before meals  isosorbide   dinitrate Tablet (ISORDIL) 5 milliGRAM(s) Oral three times a day  metoprolol succinate ER 50 milliGRAM(s) Oral daily  senna 2 Tablet(s) Oral at bedtime  tamsulosin 0.4 milliGRAM(s) Oral at bedtime    MEDICATIONS  (PRN):  acetaminophen     Tablet .. 650 milliGRAM(s) Oral every 8 hours PRN Moderate Pain (4 - 6)  dextrose Oral Gel 15 Gram(s) Oral once PRN Blood Glucose LESS THAN 70 milliGRAM(s)/deciliter

## 2024-01-08 LAB
ANION GAP SERPL CALC-SCNC: 12 MMOL/L — SIGNIFICANT CHANGE UP (ref 7–14)
ANION GAP SERPL CALC-SCNC: 12 MMOL/L — SIGNIFICANT CHANGE UP (ref 7–14)
BUN SERPL-MCNC: 52 MG/DL — HIGH (ref 10–20)
BUN SERPL-MCNC: 52 MG/DL — HIGH (ref 10–20)
CALCIUM SERPL-MCNC: 8.8 MG/DL — SIGNIFICANT CHANGE UP (ref 8.4–10.4)
CALCIUM SERPL-MCNC: 8.8 MG/DL — SIGNIFICANT CHANGE UP (ref 8.4–10.4)
CHLORIDE SERPL-SCNC: 103 MMOL/L — SIGNIFICANT CHANGE UP (ref 98–110)
CHLORIDE SERPL-SCNC: 103 MMOL/L — SIGNIFICANT CHANGE UP (ref 98–110)
CO2 SERPL-SCNC: 20 MMOL/L — SIGNIFICANT CHANGE UP (ref 17–32)
CO2 SERPL-SCNC: 20 MMOL/L — SIGNIFICANT CHANGE UP (ref 17–32)
CREAT SERPL-MCNC: 1.7 MG/DL — HIGH (ref 0.7–1.5)
CREAT SERPL-MCNC: 1.7 MG/DL — HIGH (ref 0.7–1.5)
EGFR: 38 ML/MIN/1.73M2 — LOW
EGFR: 38 ML/MIN/1.73M2 — LOW
GLUCOSE BLDC GLUCOMTR-MCNC: 176 MG/DL — HIGH (ref 70–99)
GLUCOSE BLDC GLUCOMTR-MCNC: 176 MG/DL — HIGH (ref 70–99)
GLUCOSE BLDC GLUCOMTR-MCNC: 187 MG/DL — HIGH (ref 70–99)
GLUCOSE BLDC GLUCOMTR-MCNC: 187 MG/DL — HIGH (ref 70–99)
GLUCOSE BLDC GLUCOMTR-MCNC: 206 MG/DL — HIGH (ref 70–99)
GLUCOSE BLDC GLUCOMTR-MCNC: 206 MG/DL — HIGH (ref 70–99)
GLUCOSE BLDC GLUCOMTR-MCNC: 266 MG/DL — HIGH (ref 70–99)
GLUCOSE BLDC GLUCOMTR-MCNC: 266 MG/DL — HIGH (ref 70–99)
GLUCOSE SERPL-MCNC: 216 MG/DL — HIGH (ref 70–99)
GLUCOSE SERPL-MCNC: 216 MG/DL — HIGH (ref 70–99)
HCT VFR BLD CALC: 32.3 % — LOW (ref 42–52)
HCT VFR BLD CALC: 32.3 % — LOW (ref 42–52)
HGB BLD-MCNC: 10.2 G/DL — LOW (ref 14–18)
HGB BLD-MCNC: 10.2 G/DL — LOW (ref 14–18)
MCHC RBC-ENTMCNC: 29.2 PG — SIGNIFICANT CHANGE UP (ref 27–31)
MCHC RBC-ENTMCNC: 29.2 PG — SIGNIFICANT CHANGE UP (ref 27–31)
MCHC RBC-ENTMCNC: 31.6 G/DL — LOW (ref 32–37)
MCHC RBC-ENTMCNC: 31.6 G/DL — LOW (ref 32–37)
MCV RBC AUTO: 92.6 FL — SIGNIFICANT CHANGE UP (ref 80–94)
MCV RBC AUTO: 92.6 FL — SIGNIFICANT CHANGE UP (ref 80–94)
NRBC # BLD: 0 /100 WBCS — SIGNIFICANT CHANGE UP (ref 0–0)
NRBC # BLD: 0 /100 WBCS — SIGNIFICANT CHANGE UP (ref 0–0)
PLATELET # BLD AUTO: 181 K/UL — SIGNIFICANT CHANGE UP (ref 130–400)
PLATELET # BLD AUTO: 181 K/UL — SIGNIFICANT CHANGE UP (ref 130–400)
PMV BLD: 10.1 FL — SIGNIFICANT CHANGE UP (ref 7.4–10.4)
PMV BLD: 10.1 FL — SIGNIFICANT CHANGE UP (ref 7.4–10.4)
POTASSIUM SERPL-MCNC: 4 MMOL/L — SIGNIFICANT CHANGE UP (ref 3.5–5)
POTASSIUM SERPL-MCNC: 4 MMOL/L — SIGNIFICANT CHANGE UP (ref 3.5–5)
POTASSIUM SERPL-SCNC: 4 MMOL/L — SIGNIFICANT CHANGE UP (ref 3.5–5)
POTASSIUM SERPL-SCNC: 4 MMOL/L — SIGNIFICANT CHANGE UP (ref 3.5–5)
RBC # BLD: 3.49 M/UL — LOW (ref 4.7–6.1)
RBC # BLD: 3.49 M/UL — LOW (ref 4.7–6.1)
RBC # FLD: 14.2 % — SIGNIFICANT CHANGE UP (ref 11.5–14.5)
RBC # FLD: 14.2 % — SIGNIFICANT CHANGE UP (ref 11.5–14.5)
SODIUM SERPL-SCNC: 135 MMOL/L — SIGNIFICANT CHANGE UP (ref 135–146)
SODIUM SERPL-SCNC: 135 MMOL/L — SIGNIFICANT CHANGE UP (ref 135–146)
WBC # BLD: 8.64 K/UL — SIGNIFICANT CHANGE UP (ref 4.8–10.8)
WBC # BLD: 8.64 K/UL — SIGNIFICANT CHANGE UP (ref 4.8–10.8)
WBC # FLD AUTO: 8.64 K/UL — SIGNIFICANT CHANGE UP (ref 4.8–10.8)
WBC # FLD AUTO: 8.64 K/UL — SIGNIFICANT CHANGE UP (ref 4.8–10.8)

## 2024-01-08 PROCEDURE — 99232 SBSQ HOSP IP/OBS MODERATE 35: CPT

## 2024-01-08 RX ORDER — APIXABAN 2.5 MG/1
1 TABLET, FILM COATED ORAL
Qty: 60 | Refills: 3
Start: 2024-01-08 | End: 2024-05-06

## 2024-01-08 RX ORDER — ISOSORBIDE DINITRATE 5 MG/1
1 TABLET ORAL
Qty: 90 | Refills: 3
Start: 2024-01-08 | End: 2024-05-06

## 2024-01-08 RX ORDER — BENAZEPRIL HYDROCHLORIDE 40 MG/1
1 TABLET ORAL
Refills: 0 | DISCHARGE

## 2024-01-08 RX ORDER — FUROSEMIDE 40 MG
1 TABLET ORAL
Refills: 0 | DISCHARGE

## 2024-01-08 RX ORDER — METOPROLOL TARTRATE 50 MG
1 TABLET ORAL
Qty: 30 | Refills: 3
Start: 2024-01-08 | End: 2024-05-06

## 2024-01-08 RX ORDER — METOPROLOL TARTRATE 50 MG
1 TABLET ORAL
Refills: 0 | DISCHARGE

## 2024-01-08 RX ORDER — HYDRALAZINE HCL 50 MG
1 TABLET ORAL
Qty: 90 | Refills: 3
Start: 2024-01-08 | End: 2024-05-06

## 2024-01-08 RX ADMIN — Medication 10 MILLIGRAM(S): at 05:05

## 2024-01-08 RX ADMIN — ISOSORBIDE DINITRATE 5 MILLIGRAM(S): 5 TABLET ORAL at 05:05

## 2024-01-08 RX ADMIN — Medication 2: at 16:35

## 2024-01-08 RX ADMIN — INFLUENZA VIRUS VACCINE 0.7 MILLILITER(S): 15; 15; 15; 15 SUSPENSION INTRAMUSCULAR at 14:36

## 2024-01-08 RX ADMIN — Medication 81 MILLIGRAM(S): at 10:46

## 2024-01-08 RX ADMIN — APIXABAN 2.5 MILLIGRAM(S): 2.5 TABLET, FILM COATED ORAL at 17:09

## 2024-01-08 RX ADMIN — Medication 1: at 08:02

## 2024-01-08 RX ADMIN — ATORVASTATIN CALCIUM 40 MILLIGRAM(S): 80 TABLET, FILM COATED ORAL at 21:17

## 2024-01-08 RX ADMIN — TAMSULOSIN HYDROCHLORIDE 0.4 MILLIGRAM(S): 0.4 CAPSULE ORAL at 21:17

## 2024-01-08 RX ADMIN — Medication 10 MILLIGRAM(S): at 13:38

## 2024-01-08 RX ADMIN — SENNA PLUS 2 TABLET(S): 8.6 TABLET ORAL at 21:18

## 2024-01-08 RX ADMIN — Medication 50 MILLIGRAM(S): at 05:06

## 2024-01-08 RX ADMIN — GABAPENTIN 300 MILLIGRAM(S): 400 CAPSULE ORAL at 10:46

## 2024-01-08 RX ADMIN — CHLORHEXIDINE GLUCONATE 1 APPLICATION(S): 213 SOLUTION TOPICAL at 05:06

## 2024-01-08 RX ADMIN — Medication 10 MILLIGRAM(S): at 21:17

## 2024-01-08 RX ADMIN — APIXABAN 2.5 MILLIGRAM(S): 2.5 TABLET, FILM COATED ORAL at 05:06

## 2024-01-08 RX ADMIN — Medication 3: at 11:33

## 2024-01-08 RX ADMIN — ISOSORBIDE DINITRATE 5 MILLIGRAM(S): 5 TABLET ORAL at 10:44

## 2024-01-08 RX ADMIN — Medication 20 MILLIGRAM(S): at 05:06

## 2024-01-08 RX ADMIN — ISOSORBIDE DINITRATE 5 MILLIGRAM(S): 5 TABLET ORAL at 16:21

## 2024-01-08 RX ADMIN — Medication 20 MILLIGRAM(S): at 13:40

## 2024-01-08 NOTE — PROGRESS NOTE ADULT - REASON FOR ADMISSION
Cardiac Arrest

## 2024-01-08 NOTE — PROGRESS NOTE ADULT - ASSESSMENT
Assessment:   89 year old Male with history of CAD s/p CABG presented with atrial flutter with CHB c/b brief cardiac arrest, now s/p placement of BiV ICD on 12/29 . TTE with LVE 31%, moderately reduced RV function, mild-moderate TR, and severe pHTN with dilated and noncollapsing IVC. Hospital course has been c/b MAUREEN (peaked Cr 3.1), most likely ATN in the setting of arrest and JOSE. Awaiting authorization for ROXANA.    #High-degree AV block / transient CHB  Transient CHB with brief  in-hospital cardiac arrest  s/p TVP in CCU  ECHO Severe LV dysfunction. EF 31%. Severe pulmonary hypertension (PASP = 66mmHg). The IVC is dilated and non-collapsing, indicating increased right atrial pressure.  -  SP BiV ICD Implant 12/29. CXR done with no pneumothorax.  Interrogation completed, numbers within appropriate range    # Atrial fibrillation  - cont  Toprol 50mg 12/31  - eliquis 2.5mg po BID--> currenlt on hold fopr possible LHC     # CAD s/p CABG  NSTEMI (possibly type II secondary to cardiac arrest / underlying CAD)  New LV dysfunction  -Possible LHC this admission if renal function remains stable  -Eliquis on hold  - cont BB , cont on ASA 81MG daily  - Continue on hydralazine 10 mg TID and isordil 5mg TID    #HFrEF 2/2 ICMP  - POCUS IVC 1.7 cm & < 50% collapsing .   - cont holding ACE/ARB 2/2 MAUREEN (although improving)  - Cont Lasix 20mg PO daily.  - f/u with Dr. 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 Findings are concerning for neoplastic process.  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 diabetic neuropathy  A1C 7.1   - Cont on ISS while inpatient ( hold home PO diabetic meds for now d/t MAUREEN )   - F.S ACHS   - cont on gabapentin 300mg po daily     #HLD  - cont on atorvastatin 40mg po  - LDL 53    # BPH  - Cont on tamsulosin 0.4mg  - strict I & O      Dispo: ROXANA pending authorization  Suspect DC to ROXANA 1/9/24 in AM once COVID swab results

## 2024-01-08 NOTE — PROGRESS NOTE ADULT - NS ATTEND AMEND GEN_ALL_CORE FT
89yoM with history of CABG and preserved LVEF and daily exercise routine who presented with atrial flutter with CHB c/b brief cardiac arrest, now s/p placement of BiV ICD. TTE with LVE 31%, moderately reduced RV function, mild-moderate TR, and severe pHTN with dilated and noncollapsing IVC. Hospital course has been c/b MAUREEN, most likely ATN in the setting of arrest +/- JOSE.     Plan:   - Continue ASA 81 mg daily, apixaban 2.5 mg BID, hydralazine 10 mg TID, isordil 5 mg TID, Lasix 20 mg daily, and atorvastatin 40 mg nightly   - Given patient's excellent functional status, would consider LHC when renal function returns to baseline  - Follow-up with Dr. Jorge on 1/12/24, will change date as patient's discharge has been delayed  - Awaiting insurance authorization for ROXANA  - Diabetes medications on hold given MAUREEN, will consider resuming if Cr returns to baseline  - Lab holiday tomorrow
89yoM with history of CABG and preserved LVEF and daily exercise routine who presented with atrial flutter with CHB c/b brief cardiac arrest, now s/p placement of BiV ICD. TTE with LVE 31%, moderately reduced RV function, mild-moderate TR, and severe pHTN with dilated and noncollapsing IVC. Hospital course has been c/b MAUREEN, most likely ATN in the setting of arrest +/- JOSE.     Plan:   - One-time dose of Lasix 40 mg IV  - Trend Cr  - Continue ASA 81 mg daily, apixaban 2.5 mg BID, hydralazine 10 mg TID, isordil 5 mg TID, and atorvastatin 40 mg nightly   - On Keflex for post-ICD abx  - Given patient's excellent functional status, would consider LHC when renal function returns to baseline
89yoM with history of CABG and preserved LVEF and daily exercise routine who presented with atrial flutter with CHB c/b brief cardiac arrest, now s/p placement of BiV ICD. TTE with LVEF 31%, moderately reduced RV function, mild-moderate TR, and severe pHTN with dilated and noncollapsing IVC. Hospital course has been c/b MAUREEN, most likely ATN in the setting of arrest +/- JOSE.     Patient's Cr continues to improve. He is currently medically stable on oral medications, awaiting insurance approval for ROXANA.     Given the patient will be hospitalized until Monday, we will plan for possible LHC. I explained to the patient that we will not delay his discharge for the procedure, but if he remains in the hospital, we can potentially complete his work-up.     Plan:   - Continue ASA 81 mg daily, hydralazine 10 mg TID, isordil 5 mg TID, Lasix 20 mg daily, and atorvastatin 40 mg nightly   - Hold apixaban 2.5 mg BID, if patient is staying until Tuesday, we can potentially perform a LHC on Monday if Cr returns to baseline  - Once Cr at baseline, can consider losartan instead of hydralazine and isordil  - Follow-up with Dr. Jorge on 1/12/24, will change date as patient's discharge has been delayed  - Awaiting insurance authorization for ROXANA  - Diabetes medications on hold given MAUREEN, will consider resuming if Cr returns to baseline
Breathing comfortable.  Hemodynamics stable / BP better controlled.    CXR minimally congested.  Borderline IVC.  Normal lactate.    1. CAD s/p CABG  2. ICM (severe LV dysfunction)  3. Atrial fibrillation  4. CHB s/p BiV-AICD  5. NSTEMI (possibly type II secondary to cardiac arrest / underlying CAD)  6. MAUREEN (possible JOSE component)    - Cont Toprol  - Cont Hydralazine / Isosorbide (change to Entresto when renal function stable)  - Cont Eliquis  - Judicious IV Lasix to maintain euvolemic  - Tentative cath when renal function improves / stable
s/p BiV-AICD    Feels better.  Breathing comfortable.  Hemodynamics stable / hypertensive.    1. CAD s/p CABG  2. ICM (severe LV dysfunction)  3. Atrial fibrillation  4. CHB s/p BiV-AICD  5. NSTEMI (possibly type II secondary to cardiac arrest / underlying CAD)  6. MAUREEN    - Change metoprolol tartrate to succinate  - Start Hydralazine / Isosorbide (change to Entresto when renal function stable)  - Start anticoagulation per EP    Spoke with patient and daughter (bedside).  No prior knowledge of LV dysfunction.  Lives independently, walks 3-avenues to bus and goes to gym daily.  Given good functional / cognitive capacity, cath reasonable when renal function improves / stable.
89yoM with history of CABG and preserved LVEF and daily exercise routine who presented with atrial flutter with CHB c/b brief cardiac arrest, now s/p placement of BiV ICD. TTE with LVEF 31%, moderately reduced RV function, mild-moderate TR, and severe pHTN with dilated and noncollapsing IVC. Hospital course has been c/b MAUREEN, most likely ATN in the setting of arrest +/- JOSE.     Patient's Cr continues to improve. He is currently medically stable on oral medications, awaiting insurance approval for ROXANA.     Case discussed with Dr. Jorge, no need for inpatient LHC. Will resume apixaban.     Plan:   - Continue ASA 81 mg daily, hydralazine 10 mg TID, isordil 5 mg TID, Lasix 20 mg daily, and atorvastatin 40 mg nightly   - Resume apixaban 2.5 mg BID  - In the outpatient setting, can consider losartan instead of hydralazine and isordil  - Follow-up with Dr. Jorge on 1/12/24, will change date as patient's discharge has been delayed  - Awaiting insurance authorization for ROXANA  - Diabetes medications on hold given prior MAUREEN, can resume at discharge
Breathing comfortable.  Hemodynamics stable / hypertensive.  Volume overload / pulmonary edema    1. CAD s/p CABG  2. ICM (severe LV dysfunction)  3. Atrial fibrillation  4. CHB s/p BiV-AICD  5. NSTEMI (possibly type II secondary to cardiac arrest / underlying CAD)  6. MAUREEN    - Cont Toprol  - Cont Hydralazine / Isosorbide (change to Entresto when renal function stable)  - Start anticoagulation per EP  - IV Lasix  - Tentative cath when renal function improves / stable
Patient seen and evaluated by me on 1/08/24.     89yoM with history of CABG and preserved LVEF and daily exercise routine who presented with atrial flutter with CHB c/b brief cardiac arrest, now s/p placement of BiV ICD. TTE with LVEF 31%, moderately reduced RV function, mild-moderate TR, and severe pHTN with dilated and noncollapsing IVC. Hospital course has been c/b MAUREEN, most likely ATN in the setting of arrest +/- JOSE. Cr now at 1.7, which is his presumed new baseline.     Plan:   - Continue ASA 81 mg daily, apixaban 2.5 mg BID, hydralazine 10 mg TID, isordil 5 mg TID, and atorvastatin 40 mg nightly   - Discontinue Lasix 20 mg daily  - Start torsemide 20 mg daily   - In the outpatient setting, can consider losartan instead of hydralazine and isordil  - Follow-up with Dr. Jorge on 1/12/24, will change date as patient's discharge has been delayed  - Awaiting insurance authorization for ROXANA  - Diabetes medications on hold given prior MAUREEN, can resume at discharge
89yoM with history of CABG and preserved LVEF and daily exercise routine who presented with atrial flutter with CHB c/b brief cardiac arrest, now s/p placement of BiV ICD. TTE with LVE 31%, moderately reduced RV function, mild-moderate TR, and severe pHTN with dilated and noncollapsing IVC. Hospital course has been c/b MAUREEN, most likely ATN in the setting of arrest +/- JOSE.     Plan:   - IVC today 1/03/24: 1.7 cm and 30% collapsing  - Start torsemide 20 mg PO daily   - Trend Cr and IVC  - Continue ASA 81 mg daily, apixaban 2.5 mg BID, hydralazine 10 mg TID, isordil 5 mg TID, and atorvastatin 40 mg nightly   - On Keflex for post-ICD abx  - Given patient's excellent functional status, would consider LHC when renal function returns to baseline  - Follow-up with Dr. Jorge on 1/12/24  - Discharge planning
89yoM with history of CABG and preserved LVEF and daily exercise routine who presented with atrial flutter with CHB c/b brief cardiac arrest, now s/p placement of BiV ICD. TTE with LVE 31%, moderately reduced RV function, mild-moderate TR, and severe pHTN with dilated and noncollapsing IVC. Hospital course has been c/b MAUREEN, most likely ATN in the setting of arrest +/- JOSE.     Plan:   - 2030cc UOP with torsemide 20 mg PO   - Discontinue torsemide  - Resume home Lasix 20 mg PO daily, as I suspect the patient has post-ATN diuresis and when he Cr returns to baseline, he will have the same diuretic requirement as before his hospitalization  - Continue ASA 81 mg daily, apixaban 2.5 mg BID, hydralazine 10 mg TID, isordil 5 mg TID, and atorvastatin 40 mg nightly   - On Keflex for post-ICD abx, last dose tonight  - Given patient's excellent functional status, would consider LHC when renal function returns to baseline  - Follow-up with Dr. Jorge on 1/12/24  - Discharge planning  - Diabetes medications on hold given MAUREEN

## 2024-01-08 NOTE — PROGRESS NOTE ADULT - SUBJECTIVE AND OBJECTIVE BOX
Chief complaint: Patient is a 89y old  Male who presents with a chief complaint of CHF     Interval history:  Awaiting Authorization for ROXANA  No new complaints  Doing well  Denies CP, SOB, dizziness. No overnight events.    Past medical history is notable for: DM, HTN, HLD, CAD s/p CABG    Review of systems: a complete 10- point review of systems was obtained and is negative except as stated in the interval history.    Vitals:  ICU Vital Signs Last 24 Hrs  T(C): 36.8 (08 Jan 2024 08:23), Max: 36.8 (08 Jan 2024 08:23)  T(F): 98.2 (08 Jan 2024 08:23), Max: 98.2 (08 Jan 2024 08:23)  HR: 69 (08 Jan 2024 13:37) (69 - 70)  BP: 126/63 (08 Jan 2024 13:37) (126/63 - 160/74)  BP(mean): 87 (08 Jan 2024 13:37) (87 - 107)  RR: 20 (08 Jan 2024 13:37) (18 - 21)    O2 Parameters below as of 08 Jan 2024 13:37  Patient On (Oxygen Delivery Method): room air                  Physical exam:  GENERAL: NAD, sitting up in chair.  HEAD:  Atraumatic, normocephalic  EYES: EOMI, PERRLA, conjunctiva and sclera clear  ENT: Moist mucous membranes  NECK: Supple, no JVD  HEART: Regular rate and rhythm, no murmurs, rubs, or gallops, L chest ICD site with no swelling/hematoma, steri-strips c/d/i  LUNGS: Unlabored respirations. bilateral breath sounds are clear   ABDOMEN: Soft, nontender, nondistended, +BS  EXTREMITIES: 2+ peripheral pulses bilaterally. No clubbing, cyanosis, or edema  NERVOUS SYSTEM:  A&Ox3, no focal deficits   SKIN: No rashes or lesions    Data reviewed:  - Telemetry: V-paced underlying SR HR 69    - ECG < from: 12 Lead ECG (12.28.23 @ 15:05) >  Diagnosis Line Atrial fibrillation with slow ventricular response  Right axis deviation  Left bundle branch block  Abnormal ECG    - Echo < from: TTE Echo Complete w/o Contrast w/ Doppler (12.29.23 @ 09:30) >  Summary:   1. Severely decreased global left ventricular systolic function with a   biplane EF of 31%. Mild eccentric left ventricular hypertrophy. Diastolic   function is indeterminate.   2. Normal right ventricular size with moderately reduced systolic   function.   3. Mildly enlarged left atrium.   4. Normalright atrial size.   5. Mild to moderate mitral valve regurgitation.   6. Sclerotic aortic valve with decreased opening. Focal calcification   along the left coronary cusp.   7. Mild-moderate tricuspid regurgitation.   8. Mild pulmonic valve regurgitation.   9. Severe pulmonary hypertension (PASP = 66mmHg). The IVC is dilated and   non-collapsing, indicating increased right atrial pressure.  10. There is no evidence of pericardial effusion.      - Radiology:   Xray Chest 2 Views PA/Lat (12.30.23 @ 08:43) >  Impression:    Bilateral opacities, left basilar focal atelectasis and cardiomegaly,   unchanged.    CT angio chest ct abdomen pelvis No aortic intramural hematoma, aneurysm or focal dissection. 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 Cholelithiasis and small pericholecystic inflammatory changes, may  represent acute cholecystitis.   Right lung spiculated mass and additional upper lobe lesion, as   described. Findings are concerning for neoplastic process.        - Labs:                                   10.2   8.64  )-----------( 181      ( 08 Jan 2024 05:29 )             32.3   01-08    135  |  103  |  52<H>  ----------------------------<  216<H>  4.0   |  20  |  1.7<H>    Ca    8.8      08 Jan 2024 05:29                  Cultures:    Medications:  MEDICATIONS  (STANDING):  apixaban 2.5 milliGRAM(s) Oral every 12 hours  aspirin  chewable 81 milliGRAM(s) Oral daily  atorvastatin 40 milliGRAM(s) Oral at bedtime  chlorhexidine 2% Cloths 1 Application(s) Topical <User Schedule>  dextrose 5%. 1000 milliLiter(s) (100 mL/Hr) IV Continuous <Continuous>  dextrose 5%. 1000 milliLiter(s) (50 mL/Hr) IV Continuous <Continuous>  dextrose 50% Injectable 12.5 Gram(s) IV Push once  dextrose 50% Injectable 25 Gram(s) IV Push once  dextrose 50% Injectable 25 Gram(s) IV Push once  gabapentin 300 milliGRAM(s) Oral daily  glucagon  Injectable 1 milliGRAM(s) IntraMuscular once  hydrALAZINE 10 milliGRAM(s) Oral three times a day  insulin lispro (ADMELOG) corrective regimen sliding scale   SubCutaneous three times a day before meals  isosorbide   dinitrate Tablet (ISORDIL) 5 milliGRAM(s) Oral three times a day  metoprolol succinate ER 50 milliGRAM(s) Oral daily  senna 2 Tablet(s) Oral at bedtime  tamsulosin 0.4 milliGRAM(s) Oral at bedtime  torsemide 20 milliGRAM(s) Oral daily    MEDICATIONS  (PRN):  acetaminophen     Tablet .. 650 milliGRAM(s) Oral every 8 hours PRN Moderate Pain (4 - 6)  dextrose Oral Gel 15 Gram(s) Oral once PRN Blood Glucose LESS THAN 70 milliGRAM(s)/deciliter      MEDICATIONS  (PRN):  acetaminophen     Tablet .. 650 milliGRAM(s) Oral every 8 hours PRN Moderate Pain (4 - 6)  dextrose Oral Gel 15 Gram(s) Oral once PRN Blood Glucose LESS THAN 70 milliGRAM(s)/deciliter

## 2024-01-08 NOTE — PROGRESS NOTE ADULT - PROVIDER SPECIALTY LIST ADULT
Cardiology
Cardiology
Electrophysiology
Cardiology

## 2024-01-09 ENCOUNTER — TRANSCRIPTION ENCOUNTER (OUTPATIENT)
Age: 89
End: 2024-01-09

## 2024-01-09 VITALS
SYSTOLIC BLOOD PRESSURE: 142 MMHG | HEART RATE: 69 BPM | RESPIRATION RATE: 19 BRPM | DIASTOLIC BLOOD PRESSURE: 72 MMHG | OXYGEN SATURATION: 97 % | TEMPERATURE: 98 F

## 2024-01-09 LAB
FLUAV AG NPH QL: SIGNIFICANT CHANGE UP
FLUAV AG NPH QL: SIGNIFICANT CHANGE UP
FLUBV AG NPH QL: SIGNIFICANT CHANGE UP
FLUBV AG NPH QL: SIGNIFICANT CHANGE UP
GLUCOSE BLDC GLUCOMTR-MCNC: 194 MG/DL — HIGH (ref 70–99)
GLUCOSE BLDC GLUCOMTR-MCNC: 194 MG/DL — HIGH (ref 70–99)
GLUCOSE BLDC GLUCOMTR-MCNC: 257 MG/DL — HIGH (ref 70–99)
GLUCOSE BLDC GLUCOMTR-MCNC: 257 MG/DL — HIGH (ref 70–99)
RAPID RVP RESULT: SIGNIFICANT CHANGE UP
RAPID RVP RESULT: SIGNIFICANT CHANGE UP
RSV RNA NPH QL NAA+NON-PROBE: SIGNIFICANT CHANGE UP
RSV RNA NPH QL NAA+NON-PROBE: SIGNIFICANT CHANGE UP
SARS-COV-2 RNA SPEC QL NAA+PROBE: SIGNIFICANT CHANGE UP

## 2024-01-09 PROCEDURE — 99239 HOSP IP/OBS DSCHRG MGMT >30: CPT

## 2024-01-09 RX ADMIN — ISOSORBIDE DINITRATE 5 MILLIGRAM(S): 5 TABLET ORAL at 05:05

## 2024-01-09 RX ADMIN — ISOSORBIDE DINITRATE 5 MILLIGRAM(S): 5 TABLET ORAL at 10:45

## 2024-01-09 RX ADMIN — Medication 50 MILLIGRAM(S): at 05:04

## 2024-01-09 RX ADMIN — Medication 3: at 12:10

## 2024-01-09 RX ADMIN — Medication 20 MILLIGRAM(S): at 12:25

## 2024-01-09 RX ADMIN — GABAPENTIN 300 MILLIGRAM(S): 400 CAPSULE ORAL at 10:46

## 2024-01-09 RX ADMIN — Medication 10 MILLIGRAM(S): at 13:54

## 2024-01-09 RX ADMIN — Medication 20 MILLIGRAM(S): at 05:05

## 2024-01-09 RX ADMIN — CHLORHEXIDINE GLUCONATE 1 APPLICATION(S): 213 SOLUTION TOPICAL at 05:07

## 2024-01-09 RX ADMIN — Medication 81 MILLIGRAM(S): at 10:46

## 2024-01-09 RX ADMIN — Medication 1: at 07:41

## 2024-01-09 RX ADMIN — APIXABAN 2.5 MILLIGRAM(S): 2.5 TABLET, FILM COATED ORAL at 05:05

## 2024-01-09 RX ADMIN — Medication 10 MILLIGRAM(S): at 05:04

## 2024-01-09 NOTE — DISCHARGE NOTE NURSING/CASE MANAGEMENT/SOCIAL WORK - NSDCPEFALRISK_GEN_ALL_CORE
For information on Fall & Injury Prevention, visit: [email or web address] OR  [email or web address] OR  https://www.cdc.gov/steadi/patient.html For information on Fall & Injury Prevention, visit: [email or web address] OR  [email or web address] OR  https://www.cdc.gov/steadi/patient.html

## 2024-01-09 NOTE — DISCHARGE NOTE NURSING/CASE MANAGEMENT/SOCIAL WORK - INFLUENZA IMMUNIZATION DATE (APPROXIMATE):
Cartilage Graft Text: The defect edges were debeveled with a #15 scalpel blade.  Given the location of the defect, shape of the defect, the fact the defect involved a full thickness cartilage defect a cartilage graft was deemed most appropriate.  An appropriate donor site was identified, cleansed, and anesthetized. The cartilage graft was then harvested and transferred to the recipient site, oriented appropriately and then sutured into place.  The secondary defect was then repaired using a primary closure. 08-Jan-2024

## 2024-01-09 NOTE — DISCHARGE NOTE NURSING/CASE MANAGEMENT/SOCIAL WORK - NSDCVIVACCINE_GEN_ALL_CORE_FT
influenza, high-dose, quadrivalent; 08-Jan-2024 14:36; Temi Hernandez (RN); [employer]; [id number] (Exp. Date: 08-Jun-2024); IntraMuscular; Deltoid Left.; 0.7 milliLiter(s); VIS (VIS Published: 06-Aug-2021, VIS Presented: 08-Jan-2024);    influenza, high-dose, quadrivalent; 08-Jan-2024 14:36; Temi Hernandez (RN); [employer]; [id number] (Exp. Date: 08-Jun-2024); IntraMuscular; Deltoid Left.; 0.7 milliLiter(s); VIS (VIS Published: 06-Aug-2021, VIS Presented: 08-Jan-2024);

## 2024-01-09 NOTE — DISCHARGE NOTE NURSING/CASE MANAGEMENT/SOCIAL WORK - PATIENT PORTAL LINK FT
You can access the FollowMyHealth Patient Portal offered by [facility] by registering at the following website: [email or web address]. By joining [employer]’s FollowMyHealth portal, you will also be able to view your health information using other applications (apps) compatible with our system. You can access the FollowMyHealth Patient Portal offered by [facility] by registering at the following website: [email or web address]. By joining [employer]’s FollowMyHealth portal, you will also be able to view your health information using other applications (apps) compatible with our system.

## 2024-01-13 DIAGNOSIS — I48.91 UNSPECIFIED ATRIAL FIBRILLATION: ICD-10-CM

## 2024-01-13 DIAGNOSIS — I11.0 HYPERTENSIVE HEART DISEASE WITH HEART FAILURE: ICD-10-CM

## 2024-01-13 DIAGNOSIS — I77.4 CELIAC ARTERY COMPRESSION SYNDROME: ICD-10-CM

## 2024-01-13 DIAGNOSIS — I44.2 ATRIOVENTRICULAR BLOCK, COMPLETE: ICD-10-CM

## 2024-01-13 DIAGNOSIS — I48.92 UNSPECIFIED ATRIAL FLUTTER: ICD-10-CM

## 2024-01-13 DIAGNOSIS — I21.A1 MYOCARDIAL INFARCTION TYPE 2: ICD-10-CM

## 2024-01-13 DIAGNOSIS — I46.2 CARDIAC ARREST DUE TO UNDERLYING CARDIAC CONDITION: ICD-10-CM

## 2024-01-13 DIAGNOSIS — Z23 ENCOUNTER FOR IMMUNIZATION: ICD-10-CM

## 2024-01-13 DIAGNOSIS — E11.42 TYPE 2 DIABETES MELLITUS WITH DIABETIC POLYNEUROPATHY: ICD-10-CM

## 2024-01-13 DIAGNOSIS — I70.92 CHRONIC TOTAL OCCLUSION OF ARTERY OF THE EXTREMITIES: ICD-10-CM

## 2024-01-13 DIAGNOSIS — I44.7 LEFT BUNDLE-BRANCH BLOCK, UNSPECIFIED: ICD-10-CM

## 2024-01-13 DIAGNOSIS — I25.5 ISCHEMIC CARDIOMYOPATHY: ICD-10-CM

## 2024-01-13 DIAGNOSIS — R00.1 BRADYCARDIA, UNSPECIFIED: ICD-10-CM

## 2024-01-13 DIAGNOSIS — Z79.4 LONG TERM (CURRENT) USE OF INSULIN: ICD-10-CM

## 2024-01-13 DIAGNOSIS — T50.8X5A ADVERSE EFFECT OF DIAGNOSTIC AGENTS, INITIAL ENCOUNTER: ICD-10-CM

## 2024-01-13 DIAGNOSIS — Z79.82 LONG TERM (CURRENT) USE OF ASPIRIN: ICD-10-CM

## 2024-01-13 DIAGNOSIS — N40.0 BENIGN PROSTATIC HYPERPLASIA WITHOUT LOWER URINARY TRACT SYMPTOMS: ICD-10-CM

## 2024-01-13 DIAGNOSIS — I27.20 PULMONARY HYPERTENSION, UNSPECIFIED: ICD-10-CM

## 2024-01-13 DIAGNOSIS — N14.11 CONTRAST-INDUCED NEPHROPATHY: ICD-10-CM

## 2024-01-13 DIAGNOSIS — Z79.890 HORMONE REPLACEMENT THERAPY: ICD-10-CM

## 2024-01-13 DIAGNOSIS — Z95.1 PRESENCE OF AORTOCORONARY BYPASS GRAFT: ICD-10-CM

## 2024-01-13 DIAGNOSIS — I25.10 ATHEROSCLEROTIC HEART DISEASE OF NATIVE CORONARY ARTERY WITHOUT ANGINA PECTORIS: ICD-10-CM

## 2024-01-13 DIAGNOSIS — K80.20 CALCULUS OF GALLBLADDER WITHOUT CHOLECYSTITIS WITHOUT OBSTRUCTION: ICD-10-CM

## 2024-01-13 DIAGNOSIS — N17.0 ACUTE KIDNEY FAILURE WITH TUBULAR NECROSIS: ICD-10-CM

## 2024-01-13 DIAGNOSIS — E78.5 HYPERLIPIDEMIA, UNSPECIFIED: ICD-10-CM

## 2024-01-13 DIAGNOSIS — I36.1 NONRHEUMATIC TRICUSPID (VALVE) INSUFFICIENCY: ICD-10-CM

## 2024-01-13 DIAGNOSIS — I50.20 UNSPECIFIED SYSTOLIC (CONGESTIVE) HEART FAILURE: ICD-10-CM

## 2024-01-13 DIAGNOSIS — Z79.01 LONG TERM (CURRENT) USE OF ANTICOAGULANTS: ICD-10-CM

## 2024-01-13 DIAGNOSIS — D38.1 NEOPLASM OF UNCERTAIN BEHAVIOR OF TRACHEA, BRONCHUS AND LUNG: ICD-10-CM

## 2024-01-13 DIAGNOSIS — Z79.891 LONG TERM (CURRENT) USE OF OPIATE ANALGESIC: ICD-10-CM

## 2024-01-13 DIAGNOSIS — Z95.820 PERIPHERAL VASCULAR ANGIOPLASTY STATUS WITH IMPLANTS AND GRAFTS: ICD-10-CM

## 2024-01-13 DIAGNOSIS — E03.9 HYPOTHYROIDISM, UNSPECIFIED: ICD-10-CM

## 2024-01-24 RX ORDER — METFORMIN HYDROCHLORIDE 1000 MG/1
1000 TABLET, COATED ORAL
Refills: 0 | Status: DISCONTINUED | COMMUNITY
End: 2024-01-24

## 2024-01-24 RX ORDER — AMLODIPINE BESYLATE 5 MG/1
5 TABLET ORAL DAILY
Qty: 30 | Refills: 0 | Status: DISCONTINUED | COMMUNITY
Start: 2020-05-19 | End: 2024-01-24

## 2024-01-24 RX ORDER — METOPROLOL TARTRATE 75 MG/1
TABLET, FILM COATED ORAL
Refills: 0 | Status: DISCONTINUED | COMMUNITY
End: 2024-01-24

## 2024-01-24 RX ORDER — MUPIROCIN 20 MG/G
2 OINTMENT TOPICAL 3 TIMES DAILY
Qty: 1 | Refills: 0 | Status: DISCONTINUED | COMMUNITY
Start: 2020-09-08 | End: 2024-01-24

## 2024-01-24 RX ORDER — MUPIROCIN 20 MG/G
2 OINTMENT TOPICAL 3 TIMES DAILY
Qty: 1 | Refills: 3 | Status: DISCONTINUED | COMMUNITY
Start: 2020-10-14 | End: 2024-01-24

## 2024-01-24 RX ORDER — MUPIROCIN 20 MG/G
2 OINTMENT TOPICAL TWICE DAILY
Qty: 1 | Refills: 3 | Status: DISCONTINUED | COMMUNITY
Start: 2020-07-09 | End: 2024-01-24

## 2024-01-24 RX ORDER — BENAZEPRIL HYDROCHLORIDE 20 MG/1
20 TABLET, FILM COATED ORAL
Refills: 0 | Status: DISCONTINUED | COMMUNITY
End: 2024-01-24

## 2024-01-24 RX ORDER — MUPIROCIN 20 MG/G
2 OINTMENT TOPICAL DAILY
Qty: 1 | Refills: 2 | Status: DISCONTINUED | COMMUNITY
Start: 2021-04-22 | End: 2024-01-24

## 2024-01-24 RX ORDER — TAMSULOSIN HYDROCHLORIDE 0.4 MG/1
0.4 CAPSULE ORAL
Refills: 0 | Status: DISCONTINUED | COMMUNITY
End: 2024-01-24

## 2024-01-24 RX ORDER — SIMVASTATIN 40 MG/1
40 TABLET, FILM COATED ORAL
Refills: 0 | Status: DISCONTINUED | COMMUNITY
End: 2024-01-24

## 2024-01-29 ENCOUNTER — APPOINTMENT (OUTPATIENT)
Dept: ELECTROPHYSIOLOGY | Facility: CLINIC | Age: 89
End: 2024-01-29
Payer: MEDICARE

## 2024-01-29 VITALS
BODY MASS INDEX: 24.59 KG/M2 | TEMPERATURE: 97.1 F | HEIGHT: 66 IN | WEIGHT: 153 LBS | HEART RATE: 69 BPM | DIASTOLIC BLOOD PRESSURE: 58 MMHG | SYSTOLIC BLOOD PRESSURE: 114 MMHG | RESPIRATION RATE: 18 BRPM

## 2024-01-29 DIAGNOSIS — Z51.89 ENCOUNTER FOR OTHER SPECIFIED AFTERCARE: ICD-10-CM

## 2024-01-29 DIAGNOSIS — I46.9 CARDIAC ARREST, CAUSE UNSPECIFIED: ICD-10-CM

## 2024-01-29 DIAGNOSIS — Z86.79 PERSONAL HISTORY OF OTHER DISEASES OF THE CIRCULATORY SYSTEM: ICD-10-CM

## 2024-01-29 DIAGNOSIS — Z45.02 ENCOUNTER FOR ADJUSTMENT AND MANAGEMENT OF AUTOMATIC IMPLANTABLE CARDIAC DEFIBRILLATOR: ICD-10-CM

## 2024-01-29 PROCEDURE — 93000 ELECTROCARDIOGRAM COMPLETE: CPT | Mod: 59

## 2024-01-29 PROCEDURE — 99214 OFFICE O/P EST MOD 30 MIN: CPT

## 2024-01-29 PROCEDURE — 93283 PRGRMG EVAL IMPLANTABLE DFB: CPT

## 2024-01-29 RX ORDER — BACITRACIN 500 [IU]/G
500 OINTMENT TOPICAL DAILY
Qty: 60 | Refills: 3 | Status: COMPLETED | COMMUNITY
Start: 2020-10-05 | End: 2024-01-29

## 2024-01-29 RX ORDER — ASPIRIN 81 MG
81 TABLET, DELAYED RELEASE (ENTERIC COATED) ORAL
Refills: 0 | Status: ACTIVE | COMMUNITY

## 2024-01-29 NOTE — HISTORY OF PRESENT ILLNESS
[de-identified] : 89M w/ a PMHx of DM, HTN, HLD, CAD, CABG, AFIB, hypothyroidism Patient presented to the [facility] ER 12/28/23 with 1 day of chest pain and 2 weeks of bilateral LE pain, he stated his legs felt heavy, described as soreness and has not been ambulating normally since then. In the ER he was found to be in complete heart block, went for ct scan and returned pulseless, code blue was called, patient received 1 epi and awoke during compressions mid-cycle was alert and oriented. TVP was placed in ER and patient was admitted to CCU. Pt now s/p CRT-D implanted on 12/29/23.  He presents today for initial WCK and device interrogation. Pt resides in [facility]. He is in a wheelchair. Pt accompanied by his daughter.

## 2024-01-29 NOTE — ASSESSMENT
[FreeTextEntry1] : # High-degree AV block / transient CHB # Transient CHB with brief in-hospital cardiac arrest # ICM # s/p BiV-ICD Implant # Atrial fibrillation  - Incision site healing well, clean, dry, no drainage, no redness, no bruising. I discussed with patient post-operative care in great details. I answered all questions to their satisfaction. Patient was pleased with the result of their procedure. Advised pt to avoid carrying more than 5 lb and lifting his left arm above his shoulder for a total of 6 weeks from procedure date. Also advised pt to avoid fishing, swimming and golfing for a total of 3 mo to avoid lead dislodgment. - Device interrogated and reprogrammed as described in procedure. Device function normal. All parameters stable. Optivol below threshold. No events. See Device Printout - Remote monitoring - patient is enrolled. I discussed with the patient and daughter, schedule, process, as well as associated co-pay that may not be covered by their insurance. - Patient is on Eliquis 2.5mg BID. No bleeding reported.  - BP controlled. - F/U with gen cards. Pt has appt with Dr. Jorge 2/2/2024. - Pt is undergoing PT at [facility]  I have also advised the patient to go to the nearest emergency room if he experiences any chest pain, dyspnea, syncope, or has any other compelling symptoms.  F/U 6-7mo / PRN

## 2024-01-29 NOTE — CARDIOLOGY SUMMARY
[de-identified] : 01/29/2024: BiV pacing @ 70bpm [de-identified] : TTE Echo Complete w/o Contrast w/ Doppler (12.29.23 @ 09:30) >Summary:  1. Severely decreased global left ventricular systolic function with a biplane EF of 31%. Mild eccentric left ventricular hypertrophy. Diastolic function is indeterminate.  2. Normal right ventricular size with moderately reduced systolic function.  3. Mildly enlarged left atrium.  4. Normalright atrial size.  5. Mild to moderate mitral valve regurgitation.  6. Sclerotic aortic valve with decreased opening. Focal calcification along the left coronary cusp.  7. Mild-moderate tricuspid regurgitation.  8. Mild pulmonic valve regurgitation.  9. Severe pulmonary hypertension (PASP = 66mmHg). The IVC is dilated and non-collapsing, indicating increased right atrial pressure. 10. There is no evidence of pericardial effusion.

## 2024-01-29 NOTE — PROCEDURE
[Complete Heart Block] : complete heart block [See Device Printout] : See device printout [CRT-D] : Cardiac resynchronization therapy defibrillator [VVI] : VVI [Threshold Testing Performed] : Threshold testing was performed [Sensing Amplitude ___mv] : sensing amplitude was [unfilled] mv [Lead Imp:  ___ohms] : lead impedance was [unfilled] ohms [___V @] : [unfilled] V [___ ms] : [unfilled] ms [None] : none [de-identified] : [employer] [de-identified] : Magdy  [id number] CRT-D [de-identified] : [id number] [de-identified] : 12/29/2023 [de-identified] : Longevity: 7.6 YEARS [de-identified] : No events. BP 91%

## 2024-01-29 NOTE — PHYSICAL EXAM
[General Appearance - Well Developed] : well developed [Normal Appearance] : normal appearance [Well Groomed] : well groomed [General Appearance - Well Nourished] : well nourished [No Deformities] : no deformities [General Appearance - In No Acute Distress] : no acute distress [Heart Rate And Rhythm] : heart rate and rhythm were normal [] : no respiratory distress [Left Infraclavicular] : left infraclavicular area [Clean] : clean [Dry] : dry [Well-Healed] : well-healed

## 2024-02-02 ENCOUNTER — APPOINTMENT (OUTPATIENT)
Dept: CARDIOLOGY | Facility: CLINIC | Age: 89
End: 2024-02-02
Payer: MEDICARE

## 2024-02-02 ENCOUNTER — RESULT CHARGE (OUTPATIENT)
Age: 89
End: 2024-02-02

## 2024-02-02 VITALS
WEIGHT: 153 LBS | HEART RATE: 69 BPM | SYSTOLIC BLOOD PRESSURE: 124 MMHG | DIASTOLIC BLOOD PRESSURE: 60 MMHG | HEIGHT: 66 IN | BODY MASS INDEX: 24.59 KG/M2

## 2024-02-02 PROCEDURE — 93000 ELECTROCARDIOGRAM COMPLETE: CPT

## 2024-02-02 PROCEDURE — 99214 OFFICE O/P EST MOD 30 MIN: CPT

## 2024-02-02 RX ORDER — DAPAGLIFLOZIN 10 MG/1
10 TABLET, FILM COATED ORAL DAILY
Refills: 0 | Status: ACTIVE | COMMUNITY

## 2024-02-02 RX ORDER — TAMSULOSIN HYDROCHLORIDE 0.4 MG/1
0.4 CAPSULE ORAL DAILY
Refills: 0 | Status: ACTIVE | COMMUNITY

## 2024-02-02 RX ORDER — LORATADINE 10 MG/1
10 TABLET ORAL
Qty: 90 | Refills: 0 | Status: ACTIVE | COMMUNITY
Start: 2023-05-19

## 2024-02-02 RX ORDER — GLIPIZIDE 5 MG/1
5 TABLET, FILM COATED, EXTENDED RELEASE ORAL
Qty: 90 | Refills: 0 | Status: ACTIVE | COMMUNITY
Start: 2023-03-17

## 2024-02-02 RX ORDER — GABAPENTIN 300 MG/1
300 CAPSULE ORAL
Refills: 0 | Status: ACTIVE | COMMUNITY

## 2024-02-02 RX ORDER — ATORVASTATIN CALCIUM 40 MG/1
40 TABLET, FILM COATED ORAL
Refills: 0 | Status: ACTIVE | COMMUNITY

## 2024-02-02 RX ORDER — GLIPIZIDE 5 MG/1
5 TABLET ORAL DAILY
Refills: 0 | Status: DISCONTINUED | COMMUNITY
End: 2024-02-02

## 2024-02-02 RX ORDER — SITAGLIPTIN 25 MG/1
25 TABLET, FILM COATED ORAL DAILY
Refills: 0 | Status: ACTIVE | COMMUNITY

## 2024-02-02 RX ORDER — FUROSEMIDE 20 MG/1
20 TABLET ORAL
Qty: 30 | Refills: 0 | Status: DISCONTINUED | COMMUNITY
Start: 2023-05-19 | End: 2024-02-02

## 2024-02-02 RX ORDER — APIXABAN 2.5 MG/1
2.5 TABLET, FILM COATED ORAL
Refills: 0 | Status: ACTIVE | COMMUNITY

## 2024-02-02 RX ORDER — ISOSORBIDE DINITRATE 5 MG/1
5 TABLET ORAL
Refills: 0 | Status: ACTIVE | COMMUNITY

## 2024-02-02 RX ORDER — DAPAGLIFLOZIN 10 MG/1
10 TABLET, FILM COATED ORAL
Refills: 0 | Status: DISCONTINUED | COMMUNITY
End: 2024-02-02

## 2024-02-02 RX ORDER — HYDRALAZINE HYDROCHLORIDE 10 MG/1
10 TABLET ORAL
Refills: 0 | Status: ACTIVE | COMMUNITY

## 2024-02-02 RX ORDER — METOPROLOL SUCCINATE 50 MG/1
50 TABLET, EXTENDED RELEASE ORAL
Qty: 30 | Refills: 0 | Status: ACTIVE | COMMUNITY
Start: 2024-01-08

## 2024-02-02 RX ORDER — TORSEMIDE 20 MG/1
20 TABLET ORAL DAILY
Refills: 0 | Status: ACTIVE | COMMUNITY

## 2024-02-02 NOTE — HISTORY OF PRESENT ILLNESS
[FreeTextEntry1] : 89-year-old male hospitalized in December. Presents with daughter.  CAD status post CABG  Presented with atrial fibrillation post complete heart block, NSTEMI (likely type II), and acute decompensated systolic CHF.  ECHO demonstrated moderate to severe biventricular dysfunction, moderate tricuspid regurgitation, moderate to severe pulmonary hypertension.  Status post BiV-AICD.  CHF medications initiated.  Cath deferred secondary to acute kidney injury.  Feels well.  Discharged to nursing home.  Recovering, but remains weak and deconditioned.  Some difficulty balancing.  Prior to above, walked several blocks and exercised regularly.   PMH/PSH: Diabetes Hypertension Hyperlipidemia CKD BPH PAD status post surgery

## 2024-02-02 NOTE — DISCUSSION/SUMMARY
[FreeTextEntry1] : Increase torsemide to 20 mg every 48+40 mg every 48 Continue Eliquis Continue Toprol Continue isosorbide + hydralazine Continue Farxiga Continue Lipitor Labs Defer cardiac catheterization at present (benefit not clearly greater than risk) Follow-up 2 months [EKG obtained to assist in diagnosis and management of assessed problem(s)] : EKG obtained to assist in diagnosis and management of assessed problem(s)

## 2024-02-02 NOTE — PHYSICAL EXAM
[de-identified] : Well-appearing, no distress [de-identified] : Regular, normal S1-S2, no murmur, rub, gallop [de-identified] : CTA [de-identified] : Warm, 2+ edema [de-identified] : Alert, normal affect, logical conversation

## 2024-02-02 NOTE — ASSESSMENT
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

## 2024-04-08 ENCOUNTER — APPOINTMENT (OUTPATIENT)
Dept: CARDIOLOGY | Facility: CLINIC | Age: 89
End: 2024-04-08
Payer: MEDICARE

## 2024-04-08 VITALS
DIASTOLIC BLOOD PRESSURE: 74 MMHG | BODY MASS INDEX: 24.59 KG/M2 | HEIGHT: 66 IN | SYSTOLIC BLOOD PRESSURE: 130 MMHG | HEART RATE: 72 BPM | WEIGHT: 153 LBS

## 2024-04-08 PROCEDURE — 99214 OFFICE O/P EST MOD 30 MIN: CPT

## 2024-04-08 PROCEDURE — 93000 ELECTROCARDIOGRAM COMPLETE: CPT

## 2024-04-08 RX ORDER — ATORVASTATIN CALCIUM 40 MG/1
40 TABLET, FILM COATED ORAL
Refills: 0 | Status: DISCONTINUED | COMMUNITY
End: 2024-04-08

## 2024-04-08 NOTE — DISCUSSION/SUMMARY
[FreeTextEntry1] : Continue Torsemide Continue Eliquis Continue Toprol Continue isosorbide + hydralazine Continue Farxiga Continue Lipitor Labs PT Defer cardiac catheterization at present (benefit not clearly greater than risk) Follow-up 3 months [EKG obtained to assist in diagnosis and management of assessed problem(s)] : EKG obtained to assist in diagnosis and management of assessed problem(s)

## 2024-04-08 NOTE — REASON FOR VISIT
[FreeTextEntry1] : Presents with daughter. Remains at nursing home.  Does exercises in wheelchair.  Unable to walk unassisted.  Only has PT once per week.  No interval cardiac symptoms.  Breathing comfortable.  No lightheadedness or syncope.  Labs reviewed (2/2024): Creatinine 2.0 BNP 11, 926

## 2024-04-08 NOTE — ASSESSMENT
[FreeTextEntry1] : CAD status post CABG No angina  Ischemic cardiomyopathy Severe LV dysfunction Recent NSTEMI (likely type II)  AF + high degree AV block status post BiV AICD  Chronic systolic congestive heart failure Mild volume overload.  Multifactorial LE edema (longstanding)  BP controlled

## 2024-04-08 NOTE — PHYSICAL EXAM
[de-identified] : Well-appearing, no distress [de-identified] : CTA [de-identified] : Regular, normal S1-S2, no murmur, rub, gallop [de-identified] : Warm, 1+ edema [de-identified] : Alert, normal affect, logical conversation

## 2024-05-21 ENCOUNTER — APPOINTMENT (OUTPATIENT)
Dept: CARDIOLOGY | Facility: CLINIC | Age: 89
End: 2024-05-21

## 2024-06-19 ENCOUNTER — APPOINTMENT (OUTPATIENT)
Dept: CARDIOLOGY | Facility: CLINIC | Age: 89
End: 2024-06-19

## 2024-07-01 ENCOUNTER — APPOINTMENT (OUTPATIENT)
Dept: CARDIOLOGY | Facility: CLINIC | Age: 89
End: 2024-07-01
Payer: MEDICARE

## 2024-07-01 VITALS
HEART RATE: 70 BPM | HEIGHT: 66 IN | DIASTOLIC BLOOD PRESSURE: 66 MMHG | SYSTOLIC BLOOD PRESSURE: 126 MMHG | WEIGHT: 153 LBS | BODY MASS INDEX: 24.59 KG/M2

## 2024-07-01 VITALS — WEIGHT: 153 LBS | HEART RATE: 70 BPM | HEIGHT: 66 IN | BODY MASS INDEX: 24.59 KG/M2

## 2024-07-01 DIAGNOSIS — I48.19 OTHER PERSISTENT ATRIAL FIBRILLATION: ICD-10-CM

## 2024-07-01 DIAGNOSIS — I50.22 CHRONIC SYSTOLIC (CONGESTIVE) HEART FAILURE: ICD-10-CM

## 2024-07-01 DIAGNOSIS — I25.10 ATHEROSCLEROTIC HEART DISEASE OF NATIVE CORONARY ARTERY W/OUT ANGINA PECTORIS: ICD-10-CM

## 2024-07-01 DIAGNOSIS — I44.39 OTHER ATRIOVENTRICULAR BLOCK: ICD-10-CM

## 2024-07-01 DIAGNOSIS — I10 ESSENTIAL (PRIMARY) HYPERTENSION: ICD-10-CM

## 2024-07-01 DIAGNOSIS — I25.5 ISCHEMIC CARDIOMYOPATHY: ICD-10-CM

## 2024-07-01 PROCEDURE — 93000 ELECTROCARDIOGRAM COMPLETE: CPT

## 2024-07-01 PROCEDURE — 99214 OFFICE O/P EST MOD 30 MIN: CPT

## 2024-07-19 ENCOUNTER — APPOINTMENT (OUTPATIENT)
Dept: CARDIOLOGY | Facility: CLINIC | Age: 89
End: 2024-07-19

## 2024-07-19 ENCOUNTER — NON-APPOINTMENT (OUTPATIENT)
Age: 89
End: 2024-07-19

## 2024-07-19 PROCEDURE — 93295 DEV INTERROG REMOTE 1/2/MLT: CPT

## 2024-07-19 PROCEDURE — 93296 REM INTERROG EVL PM/IDS: CPT

## 2024-08-27 NOTE — PRE-ANESTHESIA EVALUATION ADULT - NSANTHOSAYNRD_GEN_A_CORE
Caller: Jose Doshi    [facility] [address] [place], [place] - [address] [facility] - [phone] [facility] [phone] 
No. YANICK screening performed.  STOP BANG Legend: 0-2 = LOW Risk; 3-4 = INTERMEDIATE Risk; 5-8 = HIGH Risk
No. YANICK screening performed.  STOP BANG Legend: 0-2 = LOW Risk; 3-4 = INTERMEDIATE Risk; 5-8 = HIGH Risk

## 2024-09-30 ENCOUNTER — APPOINTMENT (OUTPATIENT)
Dept: ELECTROPHYSIOLOGY | Facility: CLINIC | Age: 89
End: 2024-09-30
Payer: MEDICARE

## 2024-09-30 VITALS
HEIGHT: 66 IN | BODY MASS INDEX: 23.78 KG/M2 | HEART RATE: 69 BPM | WEIGHT: 148 LBS | DIASTOLIC BLOOD PRESSURE: 63 MMHG | SYSTOLIC BLOOD PRESSURE: 116 MMHG

## 2024-09-30 DIAGNOSIS — I48.19 OTHER PERSISTENT ATRIAL FIBRILLATION: ICD-10-CM

## 2024-09-30 DIAGNOSIS — I44.39 OTHER ATRIOVENTRICULAR BLOCK: ICD-10-CM

## 2024-09-30 DIAGNOSIS — Z45.02 ENCOUNTER FOR ADJUSTMENT AND MANAGEMENT OF AUTOMATIC IMPLANTABLE CARDIAC DEFIBRILLATOR: ICD-10-CM

## 2024-09-30 PROCEDURE — 93284 PRGRMG EVAL IMPLANTABLE DFB: CPT

## 2024-09-30 PROCEDURE — 99214 OFFICE O/P EST MOD 30 MIN: CPT | Mod: 25

## 2024-09-30 NOTE — HISTORY OF PRESENT ILLNESS
[de-identified] : 89M w/ a PMHx of DM, HTN, HLD, CAD, CABG, AFIB, hypothyroidism Patient presented to the [facility] ER 12/28/23 with 1 day of chest pain and 2 weeks of bilateral LE pain, he stated his legs felt heavy, described as soreness and has not been ambulating normally since then. In the ER he was found to be in complete heart block, went for ct scan and returned pulseless, code blue was called, patient received 1 epi and awoke during compressions mid-cycle was alert and oriented. TVP was placed in ER and patient was admitted to CCU. Pt now s/p CRT-D implanted on 12/29/23.  He presents today for routine device interrogation. Pt resides in [facility]. He is in a wheelchair most of the time at NH but can walk with walker. Pt accompanied by his daughter.

## 2024-09-30 NOTE — ASSESSMENT
[FreeTextEntry1] : # High-degree AV block / transient CHB # Transient CHB with brief in-hospital cardiac arrest # ICM # s/p BiV-ICD Implant # Atrial fibrillation  - Incision site well healed, no redness, no bruising. The patient has no pain. - Device interrogated and reprogrammed as described in procedure. Device function normal. All parameters stable. No events. See Device Printout - Remote monitoring - patient is enrolled. Wrote to NH to keep monitor plugged in at all times. - Patient is on Eliquis 2.5mg BID. No bleeding reported.  - BP controlled. - F/U with gen Manicube. Pt has appt with Dr. Jorge 11/18/2024.   F/U 7-9 mo / PRN

## 2024-09-30 NOTE — PROCEDURE
[Complete Heart Block] : complete heart block [See Device Printout] : See device printout [CRT-D] : Cardiac resynchronization therapy defibrillator [VVI] : VVI [Threshold Testing Performed] : Threshold testing was performed [___V @] : [unfilled] V [___ ms] : [unfilled] ms [None] : none [Sensing Amplitude ___mv] : sensing amplitude was [unfilled] mv [Lead Imp:  ___ohms] : lead impedance was [unfilled] ohms [de-identified] : [employer] [de-identified] : Magdy  [id number] CRT-D [de-identified] : [id number] [de-identified] : 12/29/2023 [de-identified] : Longevity: 7.6 YEARS [de-identified] : No events. BP 97% Flat histograms, but patient is feeling well with no complaints

## 2024-09-30 NOTE — CARDIOLOGY SUMMARY
[de-identified] : 01/29/2024: BiV pacing @ 70bpm [de-identified] : TTE Echo Complete w/o Contrast w/ Doppler (12.29.23 @ 09:30) >Summary:  1. Severely decreased global left ventricular systolic function with a biplane EF of 31%. Mild eccentric left ventricular hypertrophy. Diastolic function is indeterminate.  2. Normal right ventricular size with moderately reduced systolic function.  3. Mildly enlarged left atrium.  4. Normalright atrial size.  5. Mild to moderate mitral valve regurgitation.  6. Sclerotic aortic valve with decreased opening. Focal calcification along the left coronary cusp.  7. Mild-moderate tricuspid regurgitation.  8. Mild pulmonic valve regurgitation.  9. Severe pulmonary hypertension (PASP = 66mmHg). The IVC is dilated and non-collapsing, indicating increased right atrial pressure. 10. There is no evidence of pericardial effusion. [de-identified] : 12/29/2023: CRT-D ([place]) implanted

## 2024-11-18 ENCOUNTER — APPOINTMENT (OUTPATIENT)
Dept: CARDIOLOGY | Facility: CLINIC | Age: 89
End: 2024-11-18

## 2024-12-30 ENCOUNTER — APPOINTMENT (OUTPATIENT)
Dept: CARDIOLOGY | Facility: CLINIC | Age: 88
End: 2024-12-30
Payer: MEDICARE

## 2024-12-30 ENCOUNTER — NON-APPOINTMENT (OUTPATIENT)
Age: 88
End: 2024-12-30

## 2024-12-30 PROCEDURE — 93296 REM INTERROG EVL PM/IDS: CPT

## 2024-12-30 PROCEDURE — 93295 DEV INTERROG REMOTE 1/2/MLT: CPT

## 2025-04-02 ENCOUNTER — APPOINTMENT (OUTPATIENT)
Dept: CARDIOLOGY | Facility: CLINIC | Age: 89
End: 2025-04-02

## 2025-04-02 PROCEDURE — 93296 REM INTERROG EVL PM/IDS: CPT

## 2025-04-02 PROCEDURE — 93295 DEV INTERROG REMOTE 1/2/MLT: CPT

## 2025-04-11 ENCOUNTER — EMERGENCY (EMERGENCY)
Facility: HOSPITAL | Age: 89
LOS: 0 days | Discharge: HOME | End: 2025-04-11
Attending: EMERGENCY MEDICINE
Payer: MEDICARE

## 2025-04-11 VITALS
OXYGEN SATURATION: 99 % | RESPIRATION RATE: 18 BRPM | TEMPERATURE: 98 F | HEART RATE: 70 BPM | HEIGHT: 66 IN | DIASTOLIC BLOOD PRESSURE: 97 MMHG | SYSTOLIC BLOOD PRESSURE: 152 MMHG | WEIGHT: 160.06 LBS

## 2025-04-11 VITALS — DIASTOLIC BLOOD PRESSURE: 85 MMHG | HEART RATE: 69 BPM | SYSTOLIC BLOOD PRESSURE: 168 MMHG

## 2025-04-11 DIAGNOSIS — E78.5 HYPERLIPIDEMIA, UNSPECIFIED: ICD-10-CM

## 2025-04-11 DIAGNOSIS — M79.644 PAIN IN RIGHT FINGER(S): ICD-10-CM

## 2025-04-11 DIAGNOSIS — I25.10 ATHEROSCLEROTIC HEART DISEASE OF NATIVE CORONARY ARTERY WITHOUT ANGINA PECTORIS: ICD-10-CM

## 2025-04-11 DIAGNOSIS — I50.9 HEART FAILURE, UNSPECIFIED: ICD-10-CM

## 2025-04-11 DIAGNOSIS — Z88.8 ALLERGY STATUS TO OTHER DRUGS, MEDICAMENTS AND BIOLOGICAL SUBSTANCES: ICD-10-CM

## 2025-04-11 DIAGNOSIS — L08.9 LOCAL INFECTION OF THE SKIN AND SUBCUTANEOUS TISSUE, UNSPECIFIED: ICD-10-CM

## 2025-04-11 DIAGNOSIS — E11.9 TYPE 2 DIABETES MELLITUS WITHOUT COMPLICATIONS: ICD-10-CM

## 2025-04-11 DIAGNOSIS — Z87.891 PERSONAL HISTORY OF NICOTINE DEPENDENCE: ICD-10-CM

## 2025-04-11 DIAGNOSIS — I11.0 HYPERTENSIVE HEART DISEASE WITH HEART FAILURE: ICD-10-CM

## 2025-04-11 PROCEDURE — 73130 X-RAY EXAM OF HAND: CPT | Mod: 26,RT

## 2025-04-11 PROCEDURE — 99284 EMERGENCY DEPT VISIT MOD MDM: CPT | Mod: FS,25

## 2025-04-11 PROCEDURE — 73130 X-RAY EXAM OF HAND: CPT | Mod: RT

## 2025-04-11 PROCEDURE — 10060 I&D ABSCESS SIMPLE/SINGLE: CPT

## 2025-04-11 PROCEDURE — 10061 I&D ABSCESS COMP/MULTIPLE: CPT

## 2025-04-11 PROCEDURE — 99283 EMERGENCY DEPT VISIT LOW MDM: CPT | Mod: 25

## 2025-04-11 RX ORDER — CEPHALEXIN 250 MG/1
500 CAPSULE ORAL EVERY 12 HOURS
Refills: 0 | Status: DISCONTINUED | OUTPATIENT
Start: 2025-04-11 | End: 2025-04-11

## 2025-04-11 RX ORDER — LIDOCAINE HCL/PF 10 MG/ML
20 VIAL (ML) INJECTION ONCE
Refills: 0 | Status: COMPLETED | OUTPATIENT
Start: 2025-04-11 | End: 2025-04-11

## 2025-04-11 RX ORDER — CEFADROXIL 500 MG/1
1 CAPSULE ORAL
Qty: 20 | Refills: 0
Start: 2025-04-11 | End: 2025-04-20

## 2025-04-11 RX ADMIN — Medication 20 MILLILITER(S): at 15:09

## 2025-04-11 NOTE — ED ADULT NURSE NOTE - NSFALLDEVICES_ED_ALL_ED
Other Burow's Advancement Flap Text: The defect edges were debeveled with a #15 scalpel blade.  Given the location of the defect and the proximity to free margins a Burow's advancement flap was deemed most appropriate.  Using a sterile surgical marker, the appropriate advancement flap was drawn incorporating the defect and placing the expected incisions within the relaxed skin tension lines where possible.    The area thus outlined was incised deep to adipose tissue with a #15 scalpel blade.  The skin margins were undermined to an appropriate distance in all directions utilizing iris scissors.

## 2025-04-11 NOTE — ED PROVIDER NOTE - ATTENDING APP SHARED VISIT CONTRIBUTION OF CARE
89-year-old male past med history of hypertension, hyperlipidemia, diabetes, CAD manage, CHF presents patient has right leg splinted to his right thumb on his wooden desk approximately 2 days ago.  Patient was at [facility] assisted living.  No other injuries.  Was trying to get it out but was unable to so came in for evaluation    CONSTITUTIONAL: Well-developed; well-nourished; in no acute distress.   SKIN: warm, dry. + tendernss and edema to distal right thumb. no erythema and drainage.   HEAD: Normocephalic; atraumatic.  EXT: Normal ROM.  5/5 strength in all 4 extremities.   NEURO: Alert, oriented, grossly unremarkable. neurovascularly intact  PSYCH: Cooperative, appropriate.

## 2025-04-11 NOTE — ED ADULT NURSE NOTE - NSFALLASSISTNEEDED_ED_ALL_ED
Patients last appointment 5/13/2022.   Patients next scheduled appointment   Future Appointments   Date Time Provider Nicole Juarez   5/26/2022  9:30 AM [facility] STRESS ROOM 1 CHEL Simpson   5/26/2022  9:30 AM [facility] RM1 CHEL Vega Standing/Walking/Toileting

## 2025-04-11 NOTE — ED PROVIDER NOTE - NSFOLLOWUPINSTRUCTIONS_ED_ALL_ED_FT
clean with soap and water daily, take duricef 500mg 2 times a day , see PMD 2-3 days for wound check

## 2025-04-11 NOTE — ED ADULT NURSE NOTE - NSFALLHARMRISKINTERV_ED_ALL_ED

## 2025-04-11 NOTE — ED PROVIDER NOTE - CLINICAL SUMMARY MEDICAL DECISION MAKING FREE TEXT BOX
Patient presents with a splinter to his right thumb.  On exploration of the thumb pus drainage from the area.  No splinter identified.  X-ray done which was negative for foreign body.  Antibiotics given.  Discharged back to nursing home facility.  Return precautions discussed

## 2025-04-11 NOTE — ED PROVIDER NOTE - PATIENT PORTAL LINK FT
You can access the FollowMyHealth Patient Portal offered by [facility] by registering at the following website: [email or web address]. By joining [employer]’s FollowMyHealth portal, you will also be able to view your health information using other applications (apps) compatible with our system.

## 2025-04-11 NOTE — ED PROVIDER NOTE - OBJECTIVE STATEMENT
Patient sent from NH for wood splinter in right thumb from desk 2 days ago,  C/o pain in area, no fever ,no numbness

## 2025-05-27 ENCOUNTER — OUTPATIENT (OUTPATIENT)
Dept: OUTPATIENT SERVICES | Facility: HOSPITAL | Age: 89
LOS: 1 days | End: 2025-05-27
Payer: MEDICARE

## 2025-05-27 ENCOUNTER — APPOINTMENT (OUTPATIENT)
Dept: PULMONOLOGY | Facility: CLINIC | Age: 89
End: 2025-05-27

## 2025-05-27 DIAGNOSIS — Z00.8 ENCOUNTER FOR OTHER GENERAL EXAMINATION: ICD-10-CM

## 2025-05-27 DIAGNOSIS — R05.9 COUGH, UNSPECIFIED: ICD-10-CM

## 2025-05-27 PROCEDURE — 71250 CT THORAX DX C-: CPT | Mod: 26

## 2025-05-27 PROCEDURE — 71250 CT THORAX DX C-: CPT

## 2025-05-28 DIAGNOSIS — R05.9 COUGH, UNSPECIFIED: ICD-10-CM

## 2025-06-24 ENCOUNTER — APPOINTMENT (OUTPATIENT)
Dept: ELECTROPHYSIOLOGY | Facility: CLINIC | Age: 89
End: 2025-06-24
Payer: MEDICARE

## 2025-06-24 VITALS
DIASTOLIC BLOOD PRESSURE: 60 MMHG | HEIGHT: 66 IN | WEIGHT: 136 LBS | SYSTOLIC BLOOD PRESSURE: 130 MMHG | BODY MASS INDEX: 21.86 KG/M2 | HEART RATE: 70 BPM

## 2025-06-24 PROCEDURE — 93284 PRGRMG EVAL IMPLANTABLE DFB: CPT

## 2025-06-24 PROCEDURE — 99214 OFFICE O/P EST MOD 30 MIN: CPT

## 2025-06-24 PROCEDURE — 93000 ELECTROCARDIOGRAM COMPLETE: CPT | Mod: 59

## 2025-08-05 ENCOUNTER — APPOINTMENT (OUTPATIENT)
Age: 89
End: 2025-08-05
Payer: MEDICARE

## 2025-08-05 ENCOUNTER — NON-APPOINTMENT (OUTPATIENT)
Age: 89
End: 2025-08-05

## 2025-08-05 VITALS
SYSTOLIC BLOOD PRESSURE: 112 MMHG | OXYGEN SATURATION: 99 % | TEMPERATURE: 97.9 F | DIASTOLIC BLOOD PRESSURE: 67 MMHG | RESPIRATION RATE: 16 BRPM | HEART RATE: 70 BPM

## 2025-08-05 VITALS — BODY MASS INDEX: 20.57 KG/M2 | WEIGHT: 123.46 LBS | HEIGHT: 64.96 IN

## 2025-08-05 DIAGNOSIS — Z86.79 PERSONAL HISTORY OF OTHER DISEASES OF THE CIRCULATORY SYSTEM: ICD-10-CM

## 2025-08-05 DIAGNOSIS — I50.22 CHRONIC SYSTOLIC (CONGESTIVE) HEART FAILURE: ICD-10-CM

## 2025-08-05 DIAGNOSIS — Z86.39 PERSONAL HISTORY OF OTHER ENDOCRINE, NUTRITIONAL AND METABOLIC DISEASE: ICD-10-CM

## 2025-08-05 DIAGNOSIS — I10 ESSENTIAL (PRIMARY) HYPERTENSION: ICD-10-CM

## 2025-08-05 DIAGNOSIS — I25.5 ISCHEMIC CARDIOMYOPATHY: ICD-10-CM

## 2025-08-05 DIAGNOSIS — E11.9 TYPE 2 DIABETES MELLITUS W/OUT COMPLICATIONS: ICD-10-CM

## 2025-08-05 DIAGNOSIS — I25.10 ATHEROSCLEROTIC HEART DISEASE OF NATIVE CORONARY ARTERY W/OUT ANGINA PECTORIS: ICD-10-CM

## 2025-08-05 DIAGNOSIS — R04.2 HEMOPTYSIS: ICD-10-CM

## 2025-08-05 DIAGNOSIS — R91.8 OTHER NONSPECIFIC ABNORMAL FINDING OF LUNG FIELD: ICD-10-CM

## 2025-08-05 PROCEDURE — 99205 OFFICE O/P NEW HI 60 MIN: CPT

## 2025-08-05 PROCEDURE — G2211 COMPLEX E/M VISIT ADD ON: CPT

## 2025-08-06 ENCOUNTER — NON-APPOINTMENT (OUTPATIENT)
Age: 89
End: 2025-08-06

## 2025-08-12 ENCOUNTER — NON-APPOINTMENT (OUTPATIENT)
Age: 89
End: 2025-08-12